# Patient Record
Sex: FEMALE | Race: BLACK OR AFRICAN AMERICAN | Employment: OTHER | URBAN - METROPOLITAN AREA
[De-identification: names, ages, dates, MRNs, and addresses within clinical notes are randomized per-mention and may not be internally consistent; named-entity substitution may affect disease eponyms.]

---

## 2018-08-30 ENCOUNTER — HOSPITAL ENCOUNTER (OUTPATIENT)
Dept: LAB | Age: 73
Discharge: HOME | End: 2018-08-30
Payer: MEDICARE

## 2018-08-30 ENCOUNTER — HOSPITAL ENCOUNTER (OUTPATIENT)
Dept: PREADMISSION TESTING | Age: 73
Discharge: HOME | End: 2018-08-30
Payer: MEDICARE

## 2018-08-30 ENCOUNTER — HOSPITAL ENCOUNTER (OUTPATIENT)
Dept: GENERAL RADIOLOGY | Age: 73
Discharge: HOME | End: 2018-08-30
Payer: MEDICARE

## 2018-08-30 DIAGNOSIS — Z01.818 PRE-OP TESTING: ICD-10-CM

## 2018-08-30 LAB
ANION GAP SERPL CALC-SCNC: 7 MMOL/L (ref 3–18)
BUN SERPL-MCNC: 14 MG/DL (ref 7–18)
BUN/CREAT SERPL: 15 (ref 12–20)
CALCIUM SERPL-MCNC: 9.1 MG/DL (ref 8.5–10.1)
CHLORIDE SERPL-SCNC: 105 MMOL/L (ref 100–108)
CO2 SERPL-SCNC: 29 MMOL/L (ref 21–32)
CREAT SERPL-MCNC: 0.96 MG/DL (ref 0.6–1.3)
ERYTHROCYTE [DISTWIDTH] IN BLOOD BY AUTOMATED COUNT: 14.4 % (ref 11.6–14.5)
GLUCOSE SERPL-MCNC: 75 MG/DL (ref 74–99)
HCT VFR BLD AUTO: 37.1 % (ref 35–45)
HGB BLD-MCNC: 12.2 G/DL (ref 12–16)
MCH RBC QN AUTO: 28 PG (ref 24–34)
MCHC RBC AUTO-ENTMCNC: 32.9 G/DL (ref 31–37)
MCV RBC AUTO: 85.3 FL (ref 74–97)
PLATELET # BLD AUTO: 255 K/UL (ref 135–420)
PMV BLD AUTO: 9.9 FL (ref 9.2–11.8)
POTASSIUM SERPL-SCNC: 3.6 MMOL/L (ref 3.5–5.5)
RBC # BLD AUTO: 4.35 M/UL (ref 4.2–5.3)
SODIUM SERPL-SCNC: 141 MMOL/L (ref 136–145)
WBC # BLD AUTO: 7.1 K/UL (ref 4.6–13.2)

## 2018-08-30 PROCEDURE — 93005 ELECTROCARDIOGRAM TRACING: CPT

## 2018-08-30 PROCEDURE — 36415 COLL VENOUS BLD VENIPUNCTURE: CPT | Performed by: ORTHOPAEDIC SURGERY

## 2018-08-30 PROCEDURE — 85027 COMPLETE CBC AUTOMATED: CPT | Performed by: ORTHOPAEDIC SURGERY

## 2018-08-30 PROCEDURE — 80048 BASIC METABOLIC PNL TOTAL CA: CPT | Performed by: ORTHOPAEDIC SURGERY

## 2018-08-30 RX ORDER — CHOLECALCIFEROL (VITAMIN D3) 125 MCG
2000 CAPSULE ORAL DAILY
COMMUNITY
End: 2020-09-21

## 2018-08-30 RX ORDER — AMLODIPINE AND BENAZEPRIL HYDROCHLORIDE 10; 20 MG/1; MG/1
1 CAPSULE ORAL DAILY
COMMUNITY
Start: 2018-08-08

## 2018-08-30 RX ORDER — EZETIMIBE 10 MG/1
10 TABLET ORAL
COMMUNITY
Start: 2018-07-09 | End: 2020-09-21

## 2018-08-30 RX ORDER — INSULIN ASPART 100 [IU]/ML
1-6 INJECTION, SOLUTION INTRAVENOUS; SUBCUTANEOUS
COMMUNITY
Start: 2018-06-15

## 2018-08-30 RX ORDER — OMEGA-3-ACID ETHYL ESTERS 1 G/1
1 CAPSULE, LIQUID FILLED ORAL
COMMUNITY
End: 2020-09-21

## 2018-08-30 RX ORDER — INSULIN GLARGINE 100 [IU]/ML
20 INJECTION, SOLUTION SUBCUTANEOUS
COMMUNITY
Start: 2018-08-23

## 2018-08-30 RX ORDER — ASPIRIN 81 MG/1
81 TABLET ORAL DAILY
COMMUNITY

## 2018-08-30 NOTE — PERIOP NOTES
PAT - SURGICAL PRE-ADMISSION INSTRUCTIONS    NAME:  Louis Remy                                                          TODAY'S DATE:  8/30/2018    SURGERY DATE:  9/26/2018                                  SURGERY ARRIVAL TIME:   0545    1. Do NOT eat or drink anything, including candy or gum, after MIDNIGHT on 09/25/18 , unless you have specific instructions from your Surgeon or Anesthesia Provider to do so. 2. No smoking on the day of surgery. 3. No alcohol 24 hours prior to the day of surgery. 4. No recreational drugs for one week prior to the day of surgery. 5. Leave all valuables, including money/purse, at home. 6. Remove all jewelry, nail polish, makeup (including mascara); no lotions, powders, deodorant, or perfume/cologne/after shave. 7. Glasses/Contact lenses and Dentures may be worn to the hospital.  They will be removed prior to surgery. 8. Call your doctor if symptoms of a cold or illness develop within 24 ours prior to surgery. 9. AN ADULT MUST DRIVE YOU HOME AFTER OUTPATIENT SURGERY. 10. If you are having an OUTPATIENT procedure, please make arrangements for a responsible adult to be with you for 24 hours after your surgery. 11. If you are admitted to the hospital, you will be assigned to a bed after surgery is complete. Normally a family member will not be able to see you until you are in your assigned bed. 15. Family is encouraged to accompany you to the hospital.  We ask visitors in the treatment area to be limited to ONE person at a time to ensure patient privacy. EXCEPTIONS WILL BE MADE AS NEEDED. 15. Children under 12 are discouraged from entering the treatment area and need to be supervised by an adult when in the waiting room. Special Instructions: Take these medications the morning of surgery with a sip of water:  NONE, STOP anticoagulants AT LEAST 1 WEEK PRIOR to your surgery or, follow other MD instructions:   All Vitamins     Patient Prep:    use CHG solution    These surgical instructions were reviewed with Emma Culver during the PAT visit . A printed copy of the instructions was provided to Emma Culver. Directions: On the morning of surgery, please go to the [facility]. Enter the building from the [facility] entrance, 1st floor (next to the Emergency Room entrance). Take the elevator to the 2nd floor. Sign in at the Registration Desk.     If you have any questions and/or concerns, please do not hesitate to call:  (Prior to the day of surgery)  [facility] unit:  [phone]  (Day of surgery)  [facility] unit:  [phone]

## 2018-08-31 LAB
ATRIAL RATE: 65 BPM
CALCULATED P AXIS, ECG09: 68 DEGREES
CALCULATED R AXIS, ECG10: 26 DEGREES
CALCULATED T AXIS, ECG11: 44 DEGREES
DIAGNOSIS, 93000: NORMAL
P-R INTERVAL, ECG05: 166 MS
Q-T INTERVAL, ECG07: 396 MS
QRS DURATION, ECG06: 72 MS
QTC CALCULATION (BEZET), ECG08: 411 MS
VENTRICULAR RATE, ECG03: 65 BPM

## 2018-09-04 ENCOUNTER — HOSPITAL ENCOUNTER (OUTPATIENT)
Dept: GENERAL RADIOLOGY | Age: 73
Discharge: HOME | End: 2018-09-04
Attending: ORTHOPAEDIC SURGERY
Payer: MEDICARE

## 2018-09-04 ENCOUNTER — HOSPITAL ENCOUNTER (OUTPATIENT)
Dept: PREADMISSION TESTING | Age: 73
Discharge: HOME | End: 2018-09-04
Payer: MEDICARE

## 2018-09-04 DIAGNOSIS — Z01.811 PRE-OP CHEST EXAM: ICD-10-CM

## 2018-09-04 PROCEDURE — 71046 X-RAY EXAM CHEST 2 VIEWS: CPT

## 2018-09-25 ENCOUNTER — ANESTHESIA EVENT (OUTPATIENT)
Dept: SURGERY | Age: 73
DRG: 460 | End: 2018-09-25
Payer: MEDICARE

## 2018-09-26 ENCOUNTER — APPOINTMENT (OUTPATIENT)
Dept: GENERAL RADIOLOGY | Age: 73
DRG: 460 | End: 2018-09-26
Attending: ORTHOPAEDIC SURGERY
Payer: MEDICARE

## 2018-09-26 ENCOUNTER — ANESTHESIA (OUTPATIENT)
Dept: SURGERY | Age: 73
DRG: 460 | End: 2018-09-26
Payer: MEDICARE

## 2018-09-26 ENCOUNTER — HOSPITAL ENCOUNTER (INPATIENT)
Age: 73
LOS: 3 days | Discharge: HOME HEALTH | DRG: 460 | End: 2018-09-29
Attending: ORTHOPAEDIC SURGERY | Admitting: ORTHOPAEDIC SURGERY
Payer: MEDICARE

## 2018-09-26 PROBLEM — M48.061 LUMBAR STENOSIS: Status: ACTIVE | Noted: 2018-09-26

## 2018-09-26 LAB
GLUCOSE BLD STRIP.AUTO-MCNC: 189 MG/DL (ref 70–110)
GLUCOSE BLD STRIP.AUTO-MCNC: 225 MG/DL (ref 70–110)
GLUCOSE BLD STRIP.AUTO-MCNC: 268 MG/DL (ref 70–110)

## 2018-09-26 PROCEDURE — C1762 CONN TISS, HUMAN(INC FASCIA): HCPCS | Performed by: ORTHOPAEDIC SURGERY

## 2018-09-26 PROCEDURE — 77030034850: Performed by: ORTHOPAEDIC SURGERY

## 2018-09-26 PROCEDURE — 77030003028 HC SUT VCRL J&J -A: Performed by: ORTHOPAEDIC SURGERY

## 2018-09-26 PROCEDURE — 74011000272 HC RX REV CODE- 272: Performed by: ORTHOPAEDIC SURGERY

## 2018-09-26 PROCEDURE — 74011250636 HC RX REV CODE- 250/636: Performed by: ORTHOPAEDIC SURGERY

## 2018-09-26 PROCEDURE — 77030031139 HC SUT VCRL2 J&J -A: Performed by: ORTHOPAEDIC SURGERY

## 2018-09-26 PROCEDURE — 77030020274 HC MISC IMPL ORTHOPEDIC: Performed by: ORTHOPAEDIC SURGERY

## 2018-09-26 PROCEDURE — 74011250636 HC RX REV CODE- 250/636

## 2018-09-26 PROCEDURE — 74011000250 HC RX REV CODE- 250: Performed by: ORTHOPAEDIC SURGERY

## 2018-09-26 PROCEDURE — C1713 ANCHOR/SCREW BN/BN,TIS/BN: HCPCS | Performed by: ORTHOPAEDIC SURGERY

## 2018-09-26 PROCEDURE — 74011636637 HC RX REV CODE- 636/637: Performed by: NURSE ANESTHETIST, CERTIFIED REGISTERED

## 2018-09-26 PROCEDURE — 76010000175 HC OR TIME 4 TO 4.5 HR INTENSV-TIER 1: Performed by: ORTHOPAEDIC SURGERY

## 2018-09-26 PROCEDURE — 77030018836 HC SOL IRR NACL ICUM -A: Performed by: ORTHOPAEDIC SURGERY

## 2018-09-26 PROCEDURE — 74011250637 HC RX REV CODE- 250/637: Performed by: NURSE ANESTHETIST, CERTIFIED REGISTERED

## 2018-09-26 PROCEDURE — 72100 X-RAY EXAM L-S SPINE 2/3 VWS: CPT

## 2018-09-26 PROCEDURE — 74011250636 HC RX REV CODE- 250/636: Performed by: NURSE ANESTHETIST, CERTIFIED REGISTERED

## 2018-09-26 PROCEDURE — 76210000006 HC OR PH I REC 0.5 TO 1 HR: Performed by: ORTHOPAEDIC SURGERY

## 2018-09-26 PROCEDURE — 77030008683 HC TU ET CUF COVD -A: Performed by: ANESTHESIOLOGY

## 2018-09-26 PROCEDURE — 77030014647 HC SEAL FBRN TISSL BAXT -D: Performed by: ORTHOPAEDIC SURGERY

## 2018-09-26 PROCEDURE — 74011000250 HC RX REV CODE- 250

## 2018-09-26 PROCEDURE — 74011636637 HC RX REV CODE- 636/637: Performed by: ORTHOPAEDIC SURGERY

## 2018-09-26 PROCEDURE — 77030037723 HC GRFT BN SUB BGNX -F: Performed by: ORTHOPAEDIC SURGERY

## 2018-09-26 PROCEDURE — 74011250637 HC RX REV CODE- 250/637: Performed by: ORTHOPAEDIC SURGERY

## 2018-09-26 PROCEDURE — 76060000039 HC ANESTHESIA 4 TO 4.5 HR: Performed by: ORTHOPAEDIC SURGERY

## 2018-09-26 PROCEDURE — 82962 GLUCOSE BLOOD TEST: CPT

## 2018-09-26 PROCEDURE — 77030032490 HC SLV COMPR SCD KNE COVD -B: Performed by: ORTHOPAEDIC SURGERY

## 2018-09-26 PROCEDURE — 65270000029 HC RM PRIVATE

## 2018-09-26 PROCEDURE — 77030008477 HC STYL SATN SLP COVD -A: Performed by: ANESTHESIOLOGY

## 2018-09-26 PROCEDURE — 77030004391 HC BUR FLUT MEDT -C: Performed by: ORTHOPAEDIC SURGERY

## 2018-09-26 PROCEDURE — 0SG1071 FUSION OF 2 OR MORE LUMBAR VERTEBRAL JOINTS WITH AUTOLOGOUS TISSUE SUBSTITUTE, POSTERIOR APPROACH, POSTERIOR COLUMN, OPEN APPROACH: ICD-10-PCS | Performed by: ORTHOPAEDIC SURGERY

## 2018-09-26 PROCEDURE — 77030018673: Performed by: ORTHOPAEDIC SURGERY

## 2018-09-26 PROCEDURE — 77030031359 HC BLD BN MILL DISP STRY -E: Performed by: ORTHOPAEDIC SURGERY

## 2018-09-26 PROCEDURE — 74011250637 HC RX REV CODE- 250/637: Performed by: INTERNAL MEDICINE

## 2018-09-26 PROCEDURE — 97162 PT EVAL MOD COMPLEX 30 MIN: CPT

## 2018-09-26 PROCEDURE — 77030018723 HC ELCTRD BLD COVD -A: Performed by: ORTHOPAEDIC SURGERY

## 2018-09-26 PROCEDURE — 97116 GAIT TRAINING THERAPY: CPT

## 2018-09-26 PROCEDURE — 74011636637 HC RX REV CODE- 636/637: Performed by: INTERNAL MEDICINE

## 2018-09-26 PROCEDURE — 77030013079 HC BLNKT BAIR HGGR 3M -A: Performed by: ANESTHESIOLOGY

## 2018-09-26 PROCEDURE — 30253N0: ICD-10-PCS | Performed by: ANESTHESIOLOGY

## 2018-09-26 DEVICE — SCREW POLYAX ASMBLY 6.5MMX45MM: Type: IMPLANTABLE DEVICE | Site: BACK | Status: FUNCTIONAL

## 2018-09-26 DEVICE — SCREW SPNL L50MM OD65MM POLYAX ASSEMB CANAVERAL: Type: IMPLANTABLE DEVICE | Site: BACK | Status: FUNCTIONAL

## 2018-09-26 DEVICE — GRAFT BNE 6ML SUB OSTEOSPAN MOLD OPN BRL SYR MORPHEUS: Type: IMPLANTABLE DEVICE | Site: BACK | Status: FUNCTIONAL

## 2018-09-26 RX ORDER — GLYCOPYRROLATE 0.2 MG/ML
INJECTION INTRAMUSCULAR; INTRAVENOUS AS NEEDED
Status: DISCONTINUED | OUTPATIENT
Start: 2018-09-26 | End: 2018-09-26 | Stop reason: HOSPADM

## 2018-09-26 RX ORDER — VANCOMYCIN HYDROCHLORIDE 1 G/20ML
INJECTION, POWDER, LYOPHILIZED, FOR SOLUTION INTRAVENOUS AS NEEDED
Status: DISCONTINUED | OUTPATIENT
Start: 2018-09-26 | End: 2018-09-26 | Stop reason: HOSPADM

## 2018-09-26 RX ORDER — ONDANSETRON 2 MG/ML
INJECTION INTRAMUSCULAR; INTRAVENOUS AS NEEDED
Status: DISCONTINUED | OUTPATIENT
Start: 2018-09-26 | End: 2018-09-26 | Stop reason: HOSPADM

## 2018-09-26 RX ORDER — CEFAZOLIN SODIUM 2 G/50ML
2 SOLUTION INTRAVENOUS EVERY 8 HOURS
Status: COMPLETED | OUTPATIENT
Start: 2018-09-26 | End: 2018-09-27

## 2018-09-26 RX ORDER — DEXTROSE MONOHYDRATE 25 G/50ML
25-50 INJECTION, SOLUTION INTRAVENOUS AS NEEDED
Status: DISCONTINUED | OUTPATIENT
Start: 2018-09-26 | End: 2018-09-26

## 2018-09-26 RX ORDER — ADHESIVE BANDAGE
30 BANDAGE TOPICAL DAILY PRN
Status: DISCONTINUED | OUTPATIENT
Start: 2018-09-26 | End: 2018-09-29 | Stop reason: HOSPADM

## 2018-09-26 RX ORDER — SODIUM CHLORIDE, SODIUM LACTATE, POTASSIUM CHLORIDE, CALCIUM CHLORIDE 600; 310; 30; 20 MG/100ML; MG/100ML; MG/100ML; MG/100ML
75 INJECTION, SOLUTION INTRAVENOUS CONTINUOUS
Status: DISPENSED | OUTPATIENT
Start: 2018-09-26 | End: 2018-09-27

## 2018-09-26 RX ORDER — MORPHINE SULFATE 10 MG/ML
5 INJECTION, SOLUTION INTRAMUSCULAR; INTRAVENOUS
Status: DISCONTINUED | OUTPATIENT
Start: 2018-09-26 | End: 2018-09-29 | Stop reason: HOSPADM

## 2018-09-26 RX ORDER — VECURONIUM BROMIDE FOR INJECTION 1 MG/ML
INJECTION, POWDER, LYOPHILIZED, FOR SOLUTION INTRAVENOUS AS NEEDED
Status: DISCONTINUED | OUTPATIENT
Start: 2018-09-26 | End: 2018-09-26 | Stop reason: HOSPADM

## 2018-09-26 RX ORDER — SODIUM CHLORIDE, SODIUM LACTATE, POTASSIUM CHLORIDE, CALCIUM CHLORIDE 600; 310; 30; 20 MG/100ML; MG/100ML; MG/100ML; MG/100ML
75 INJECTION, SOLUTION INTRAVENOUS CONTINUOUS
Status: DISCONTINUED | OUTPATIENT
Start: 2018-09-26 | End: 2018-09-26

## 2018-09-26 RX ORDER — FAMOTIDINE 20 MG/1
20 TABLET, FILM COATED ORAL ONCE
Status: COMPLETED | OUTPATIENT
Start: 2018-09-26 | End: 2018-09-26

## 2018-09-26 RX ORDER — FENTANYL CITRATE 50 UG/ML
INJECTION, SOLUTION INTRAMUSCULAR; INTRAVENOUS AS NEEDED
Status: DISCONTINUED | OUTPATIENT
Start: 2018-09-26 | End: 2018-09-26 | Stop reason: HOSPADM

## 2018-09-26 RX ORDER — HEPARIN SODIUM 1000 [USP'U]/ML
INJECTION, SOLUTION INTRAVENOUS; SUBCUTANEOUS AS NEEDED
Status: DISCONTINUED | OUTPATIENT
Start: 2018-09-26 | End: 2018-09-26 | Stop reason: HOSPADM

## 2018-09-26 RX ORDER — OXYCODONE AND ACETAMINOPHEN 10; 325 MG/1; MG/1
1-2 TABLET ORAL
Status: DISCONTINUED | OUTPATIENT
Start: 2018-09-26 | End: 2018-09-29 | Stop reason: HOSPADM

## 2018-09-26 RX ORDER — ONDANSETRON 2 MG/ML
4 INJECTION INTRAMUSCULAR; INTRAVENOUS
Status: DISCONTINUED | OUTPATIENT
Start: 2018-09-26 | End: 2018-09-29 | Stop reason: HOSPADM

## 2018-09-26 RX ORDER — FENTANYL CITRATE 50 UG/ML
50 INJECTION, SOLUTION INTRAMUSCULAR; INTRAVENOUS AS NEEDED
Status: DISCONTINUED | OUTPATIENT
Start: 2018-09-26 | End: 2018-09-26

## 2018-09-26 RX ORDER — DEXAMETHASONE SODIUM PHOSPHATE 4 MG/ML
INJECTION, SOLUTION INTRA-ARTICULAR; INTRALESIONAL; INTRAMUSCULAR; INTRAVENOUS; SOFT TISSUE AS NEEDED
Status: DISCONTINUED | OUTPATIENT
Start: 2018-09-26 | End: 2018-09-26 | Stop reason: HOSPADM

## 2018-09-26 RX ORDER — PROPOFOL 10 MG/ML
INJECTION, EMULSION INTRAVENOUS AS NEEDED
Status: DISCONTINUED | OUTPATIENT
Start: 2018-09-26 | End: 2018-09-26 | Stop reason: HOSPADM

## 2018-09-26 RX ORDER — DOCUSATE SODIUM 100 MG/1
100 CAPSULE, LIQUID FILLED ORAL DAILY
Status: DISCONTINUED | OUTPATIENT
Start: 2018-09-27 | End: 2018-09-29 | Stop reason: HOSPADM

## 2018-09-26 RX ORDER — DIAZEPAM 5 MG/1
5 TABLET ORAL
Status: DISCONTINUED | OUTPATIENT
Start: 2018-09-26 | End: 2018-09-29 | Stop reason: HOSPADM

## 2018-09-26 RX ORDER — SODIUM CHLORIDE AND POTASSIUM CHLORIDE .9; .15 G/100ML; G/100ML
SOLUTION INTRAVENOUS CONTINUOUS
Status: DISCONTINUED | OUTPATIENT
Start: 2018-09-26 | End: 2018-09-27

## 2018-09-26 RX ORDER — INSULIN GLARGINE 100 [IU]/ML
15 INJECTION, SOLUTION SUBCUTANEOUS DAILY
Status: DISCONTINUED | OUTPATIENT
Start: 2018-09-26 | End: 2018-09-26

## 2018-09-26 RX ORDER — HYDROMORPHONE HYDROCHLORIDE 1 MG/ML
INJECTION, SOLUTION INTRAMUSCULAR; INTRAVENOUS; SUBCUTANEOUS AS NEEDED
Status: DISCONTINUED | OUTPATIENT
Start: 2018-09-26 | End: 2018-09-26 | Stop reason: HOSPADM

## 2018-09-26 RX ORDER — MAG HYDROX/ALUMINUM HYD/SIMETH 200-200-20
30 SUSPENSION, ORAL (FINAL DOSE FORM) ORAL
Status: DISCONTINUED | OUTPATIENT
Start: 2018-09-26 | End: 2018-09-29 | Stop reason: HOSPADM

## 2018-09-26 RX ORDER — CEFAZOLIN SODIUM 2 G/50ML
2 SOLUTION INTRAVENOUS
Status: COMPLETED | OUTPATIENT
Start: 2018-09-26 | End: 2018-09-26

## 2018-09-26 RX ORDER — MORPHINE SULFATE 4 MG/ML
2 INJECTION INTRAVENOUS
Status: DISCONTINUED | OUTPATIENT
Start: 2018-09-26 | End: 2018-09-26

## 2018-09-26 RX ORDER — OXYCODONE AND ACETAMINOPHEN 10; 325 MG/1; MG/1
2 TABLET ORAL
Status: DISCONTINUED | OUTPATIENT
Start: 2018-09-26 | End: 2018-09-26

## 2018-09-26 RX ORDER — NEOSTIGMINE METHYLSULFATE 5 MG/5 ML
SYRINGE (ML) INTRAVENOUS AS NEEDED
Status: DISCONTINUED | OUTPATIENT
Start: 2018-09-26 | End: 2018-09-26 | Stop reason: HOSPADM

## 2018-09-26 RX ORDER — SODIUM CHLORIDE 0.9 % (FLUSH) 0.9 %
5-10 SYRINGE (ML) INJECTION EVERY 8 HOURS
Status: DISCONTINUED | OUTPATIENT
Start: 2018-09-26 | End: 2018-09-26

## 2018-09-26 RX ORDER — INSULIN LISPRO 100 [IU]/ML
INJECTION, SOLUTION INTRAVENOUS; SUBCUTANEOUS ONCE
Status: COMPLETED | OUTPATIENT
Start: 2018-09-26 | End: 2018-09-26

## 2018-09-26 RX ORDER — INSULIN LISPRO 100 [IU]/ML
INJECTION, SOLUTION INTRAVENOUS; SUBCUTANEOUS
Status: DISCONTINUED | OUTPATIENT
Start: 2018-09-27 | End: 2018-09-26

## 2018-09-26 RX ORDER — DEXTROSE MONOHYDRATE 25 G/50ML
25-50 INJECTION, SOLUTION INTRAVENOUS AS NEEDED
Status: DISCONTINUED | OUTPATIENT
Start: 2018-09-26 | End: 2018-09-29 | Stop reason: HOSPADM

## 2018-09-26 RX ORDER — INSULIN LISPRO 100 [IU]/ML
INJECTION, SOLUTION INTRAVENOUS; SUBCUTANEOUS
Status: DISCONTINUED | OUTPATIENT
Start: 2018-09-26 | End: 2018-09-29 | Stop reason: HOSPADM

## 2018-09-26 RX ORDER — MAGNESIUM SULFATE 100 %
16 CRYSTALS MISCELLANEOUS AS NEEDED
Status: DISCONTINUED | OUTPATIENT
Start: 2018-09-26 | End: 2018-09-29 | Stop reason: HOSPADM

## 2018-09-26 RX ORDER — MORPHINE SULFATE 10 MG/ML
10 INJECTION, SOLUTION INTRAMUSCULAR; INTRAVENOUS
Status: DISCONTINUED | OUTPATIENT
Start: 2018-09-26 | End: 2018-09-26

## 2018-09-26 RX ORDER — MAGNESIUM SULFATE 100 %
4 CRYSTALS MISCELLANEOUS AS NEEDED
Status: DISCONTINUED | OUTPATIENT
Start: 2018-09-26 | End: 2018-09-26

## 2018-09-26 RX ORDER — INSULIN LISPRO 100 [IU]/ML
INJECTION, SOLUTION INTRAVENOUS; SUBCUTANEOUS
Status: DISCONTINUED | OUTPATIENT
Start: 2018-09-26 | End: 2018-09-26

## 2018-09-26 RX ORDER — INSULIN GLARGINE 100 [IU]/ML
15 INJECTION, SOLUTION SUBCUTANEOUS
Status: DISCONTINUED | OUTPATIENT
Start: 2018-09-26 | End: 2018-09-27

## 2018-09-26 RX ORDER — DIPHENHYDRAMINE HYDROCHLORIDE 50 MG/ML
25 INJECTION, SOLUTION INTRAMUSCULAR; INTRAVENOUS
Status: DISCONTINUED | OUTPATIENT
Start: 2018-09-26 | End: 2018-09-26

## 2018-09-26 RX ORDER — ACETAMINOPHEN 325 MG/1
650 TABLET ORAL
Status: DISCONTINUED | OUTPATIENT
Start: 2018-09-26 | End: 2018-09-29 | Stop reason: HOSPADM

## 2018-09-26 RX ORDER — LIDOCAINE HYDROCHLORIDE 20 MG/ML
INJECTION, SOLUTION EPIDURAL; INFILTRATION; INTRACAUDAL; PERINEURAL AS NEEDED
Status: DISCONTINUED | OUTPATIENT
Start: 2018-09-26 | End: 2018-09-26 | Stop reason: HOSPADM

## 2018-09-26 RX ORDER — MAGNESIUM SULFATE 100 %
4 CRYSTALS MISCELLANEOUS AS NEEDED
Status: DISCONTINUED | OUTPATIENT
Start: 2018-09-26 | End: 2018-09-26 | Stop reason: SDUPTHER

## 2018-09-26 RX ORDER — SODIUM CHLORIDE 0.9 % (FLUSH) 0.9 %
5-10 SYRINGE (ML) INJECTION AS NEEDED
Status: DISCONTINUED | OUTPATIENT
Start: 2018-09-26 | End: 2018-09-29 | Stop reason: HOSPADM

## 2018-09-26 RX ORDER — DEXTROSE 50 % IN WATER (D50W) INTRAVENOUS SYRINGE
50 AS NEEDED
Status: DISCONTINUED | OUTPATIENT
Start: 2018-09-26 | End: 2018-09-29 | Stop reason: HOSPADM

## 2018-09-26 RX ORDER — HYDROCODONE BITARTRATE AND ACETAMINOPHEN 5; 325 MG/1; MG/1
1 TABLET ORAL ONCE
Status: DISCONTINUED | OUTPATIENT
Start: 2018-09-26 | End: 2018-09-26

## 2018-09-26 RX ORDER — LIDOCAINE HYDROCHLORIDE 10 MG/ML
0.1 INJECTION, SOLUTION EPIDURAL; INFILTRATION; INTRACAUDAL; PERINEURAL AS NEEDED
Status: DISCONTINUED | OUTPATIENT
Start: 2018-09-26 | End: 2018-09-29 | Stop reason: HOSPADM

## 2018-09-26 RX ADMIN — INSULIN GLARGINE 15 UNITS: 100 INJECTION, SOLUTION SUBCUTANEOUS at 22:43

## 2018-09-26 RX ADMIN — HYDROMORPHONE HYDROCHLORIDE 1 MG: 1 INJECTION, SOLUTION INTRAMUSCULAR; INTRAVENOUS; SUBCUTANEOUS at 11:14

## 2018-09-26 RX ADMIN — FENTANYL CITRATE 50 MCG: 50 INJECTION, SOLUTION INTRAMUSCULAR; INTRAVENOUS at 12:13

## 2018-09-26 RX ADMIN — CEFAZOLIN SODIUM 2 G: 2 SOLUTION INTRAVENOUS at 11:43

## 2018-09-26 RX ADMIN — HYDROMORPHONE HYDROCHLORIDE 0.5 MG: 1 INJECTION, SOLUTION INTRAMUSCULAR; INTRAVENOUS; SUBCUTANEOUS at 11:08

## 2018-09-26 RX ADMIN — OXYCODONE HYDROCHLORIDE AND ACETAMINOPHEN 2 TABLET: 10; 325 TABLET ORAL at 22:19

## 2018-09-26 RX ADMIN — ONDANSETRON 4 MG: 2 INJECTION INTRAMUSCULAR; INTRAVENOUS at 11:30

## 2018-09-26 RX ADMIN — POTASSIUM CHLORIDE AND SODIUM CHLORIDE: 900; 150 INJECTION, SOLUTION INTRAVENOUS at 22:50

## 2018-09-26 RX ADMIN — POTASSIUM CHLORIDE AND SODIUM CHLORIDE: 900; 150 INJECTION, SOLUTION INTRAVENOUS at 12:57

## 2018-09-26 RX ADMIN — PROPOFOL 120 MG: 10 INJECTION, EMULSION INTRAVENOUS at 07:39

## 2018-09-26 RX ADMIN — VECURONIUM BROMIDE FOR INJECTION 3 MG: 1 INJECTION, POWDER, LYOPHILIZED, FOR SOLUTION INTRAVENOUS at 10:08

## 2018-09-26 RX ADMIN — FAMOTIDINE 20 MG: 20 TABLET ORAL at 07:09

## 2018-09-26 RX ADMIN — INSULIN LISPRO 6 UNITS: 100 INJECTION, SOLUTION INTRAVENOUS; SUBCUTANEOUS at 12:07

## 2018-09-26 RX ADMIN — SODIUM CHLORIDE, SODIUM LACTATE, POTASSIUM CHLORIDE, AND CALCIUM CHLORIDE: 600; 310; 30; 20 INJECTION, SOLUTION INTRAVENOUS at 10:07

## 2018-09-26 RX ADMIN — LIDOCAINE HYDROCHLORIDE 100 MG: 20 INJECTION, SOLUTION EPIDURAL; INFILTRATION; INTRACAUDAL; PERINEURAL at 07:39

## 2018-09-26 RX ADMIN — GLYCOPYRROLATE 0.4 MG: 0.2 INJECTION INTRAMUSCULAR; INTRAVENOUS at 11:30

## 2018-09-26 RX ADMIN — CEFAZOLIN SODIUM 2 G: 2 SOLUTION INTRAVENOUS at 07:44

## 2018-09-26 RX ADMIN — Medication 3 MG: at 11:30

## 2018-09-26 RX ADMIN — VECURONIUM BROMIDE FOR INJECTION 10 MG: 1 INJECTION, POWDER, LYOPHILIZED, FOR SOLUTION INTRAVENOUS at 07:39

## 2018-09-26 RX ADMIN — Medication 10 ML: at 22:18

## 2018-09-26 RX ADMIN — OXYCODONE HYDROCHLORIDE AND ACETAMINOPHEN 2 TABLET: 10; 325 TABLET ORAL at 15:41

## 2018-09-26 RX ADMIN — INSULIN LISPRO 3 UNITS: 100 INJECTION, SOLUTION INTRAVENOUS; SUBCUTANEOUS at 07:09

## 2018-09-26 RX ADMIN — INSULIN LISPRO 6 UNITS: 100 INJECTION, SOLUTION INTRAVENOUS; SUBCUTANEOUS at 23:45

## 2018-09-26 RX ADMIN — DEXAMETHASONE SODIUM PHOSPHATE 8 MG: 4 INJECTION, SOLUTION INTRA-ARTICULAR; INTRALESIONAL; INTRAMUSCULAR; INTRAVENOUS; SOFT TISSUE at 07:46

## 2018-09-26 RX ADMIN — SODIUM CHLORIDE, SODIUM LACTATE, POTASSIUM CHLORIDE, AND CALCIUM CHLORIDE 75 ML/HR: 600; 310; 30; 20 INJECTION, SOLUTION INTRAVENOUS at 07:03

## 2018-09-26 RX ADMIN — ONDANSETRON 4 MG: 2 INJECTION INTRAMUSCULAR; INTRAVENOUS at 15:22

## 2018-09-26 RX ADMIN — HYDROMORPHONE HYDROCHLORIDE 0.5 MG: 1 INJECTION, SOLUTION INTRAMUSCULAR; INTRAVENOUS; SUBCUTANEOUS at 10:37

## 2018-09-26 RX ADMIN — CEFAZOLIN SODIUM 2 G: 2 SOLUTION INTRAVENOUS at 19:11

## 2018-09-26 RX ADMIN — VECURONIUM BROMIDE FOR INJECTION 3 MG: 1 INJECTION, POWDER, LYOPHILIZED, FOR SOLUTION INTRAVENOUS at 08:45

## 2018-09-26 RX ADMIN — FENTANYL CITRATE 100 MCG: 50 INJECTION, SOLUTION INTRAMUSCULAR; INTRAVENOUS at 07:46

## 2018-09-26 RX ADMIN — Medication 10 ML: at 15:25

## 2018-09-26 RX ADMIN — FENTANYL CITRATE 100 MCG: 50 INJECTION, SOLUTION INTRAMUSCULAR; INTRAVENOUS at 07:34

## 2018-09-26 NOTE — PERIOP NOTES
TRANSFER - OUT REPORT:    Verbal report given to louie bartlett(name) on Raghavendra Toledo  being transferred to 2219(unit) for routine post - op       Report consisted of patients Situation, Background, Assessment and   Recommendations(SBAR). Information from the following report(s) SBAR, OR Summary, Intake/Output and MAR was reviewed with the receiving nurse. Lines:   Peripheral IV 09/26/18 Right Hand (Active)   Site Assessment Clean, dry, & intact 9/26/2018 11:56 AM   Phlebitis Assessment 0 9/26/2018 11:56 AM   Infiltration Assessment 0 9/26/2018 11:56 AM   Dressing Status Clean, dry, & intact 9/26/2018 11:56 AM   Dressing Type Transparent;Tape 9/26/2018 11:56 AM   Hub Color/Line Status Infusing;Pink 9/26/2018 11:56 AM        Opportunity for questions and clarification was provided.       Patient transported with:   [employer]

## 2018-09-26 NOTE — PROGRESS NOTES
conducted a pre-surgery visit with Dipesh Stroud, who is a 74 y.o.,female. The  provided the following Interventions:  Initiated a relationship of care and support. Offered prayer and assurance of continued prayers on patient's behalf. Plan:  Chaplains will continue to follow and will provide pastoral care on an as needed/requested basis.  recommends bedside caregivers page  on duty if patient shows signs of acute spiritual or emotional distress.     [address] [facility]   Staff [address] [facility]   [phone]

## 2018-09-26 NOTE — OP NOTES
BRIEF OPERATIVE NOTE    Date of Procedure: 9/26/2018     Preoperative Diagnosis: spondylolisthesis  m43.16    Postoperative Diagnosis: spondylolisthesis  m43.16      Procedure: Procedure(s):  decompression and fusion, flospine l3-5    Surgeon(s) and Role:     * Siri Bartlett MD - Primary    Anesthesia: General     Findings: deg sonduylo l3-4 and 4-5 with stenosis     Estimated Blood Loss: 200  Replaced0      [id number]        [id number]    Specimens: * No specimens in log *     Tubes/Drains: None    Needle/sponge count:  Correct    Complications: 0    Plan    Up at [facility]  Dr Yandy Myers to see  Luke fierro in am  PT ambulate wioth tlso  Home 2-3 days with tlso and percocet  rto 1 month

## 2018-09-26 NOTE — CONSULTS
Reason for consultation:    Monitoring and management of  acute and chronic medical problems   POSTOPERATIVE DIAGNOSIS:   Degenerative spondylolisthesis, L4-L5 with stenosis.     PROCEDURES PERFORMED:  1. Lumbar decompression L3-L5 bilateral exploration and decompression of the L3, L4, L5 nerve roots with foraminotomy and partial facetectomy. 2.  Flow spine pedicle instrumentation L3-L5. 3.  Bilateral posterolateral spinal fusion L3-L5 local bone graft and autologous platelet factor.       HPI: Pleasant lady post uneventful L/S decompression and fusion; presently post op pain controlled      ACTIVE MEDICAL PROBLEMS/PMH/PSH  T2DM dxd about 10 yrs ago; no complications; no ED or hospitalization for hypo/hyperglycemia  HTN  HLP    Seborrheic dermatitis       Menopausal syndrome   previously requiring, hormonal therapy   Tobacco abuse   History of quit in 2003   Carpal tunnel syndrome       Uterine fibroid       Venous varices       Osteopenia       Unspecified vitamin D deficiency       Diarrhea   likely IBS, neg celiac panel 5/2005 improved with diet changes   Anemia       Back pain       Hip pain       Snoring       Fatigue       Plantar fasciitis       Varicella       No personal or family h/o thrombophilia disorder     Patient Active Problem List   Diagnosis Code    Lumbar stenosis M48.061     Past Medical History:   Diagnosis Date    Diabetes ([facility] [address]     Hypercholesteremia     Hypertension        Past Surgical History:   Procedure Laterality Date    HX ROTATOR CUFF REPAIR         PTA MEDICATIONS  Prescriptions Prior to Admission   Medication Sig    LANTUS SOLOSTAR U-100 INSULIN 100 unit/mL (3 mL) inpn 16 Units by SubCUTAneous route nightly.  NOVOLOG FLEXPEN U-100 INSULIN 100 unit/mL inpn 1-6 Units by SubCUTAneous route Before breakfast, lunch, and dinner.  amLODIPine-benazepril (LOTREL) 10-20 mg per capsule Take 1 Cap by mouth daily.     ezetimibe (ZETIA) 10 mg tablet Take 10 mg by mouth every three (3) days.  cholecalciferol, vitamin D3, 2,000 unit tab Take 2,000 Units by mouth daily.  aspirin delayed-release 81 mg tablet Take 81 mg by mouth daily.  omega-3 acid ethyl esters (LOVAZA) 1 gram capsule Take 1 g by mouth daily (with breakfast). ALLERGIES:  Glimepiride other/intolerance   04/17/2018     Terbinafine Hcl other/intolerance   07/10/2017 Stomach pains     Metformin other/intolerance   04/17/2018     Statins-Hmg-Coa Reductase Inhibitors           FAMILY HISTORY   Cancer, Prostate Brother       Hypertension Brother       COPD Brother       Cancer, Prostate Brother       Heart Failure Father       Cancer, Breast Maternal Grandmother       Heart Failure Mother       Thyroid Disease Mother       Vascular Disease Mother       Diabetes Sister         SOCIAL HISTORY  Social History     Social History    Marital status:      Spouse name: N/A    Number of children: N/A    Years of education: N/A     Occupational History    Not on file. Social History Main Topics    Smoking status: Former Smoker     Quit date: 8/30/2002    Smokeless tobacco: Never Used    Alcohol use No      Comment: occ.  Drug use: No    Sexual activity: Not on file     Other Topics Concern    Not on file     Social History Narrative       ROS:  Constitutional:  No chills . + sinus headache. Eyes: No visual changes  Ears: . No hearing loss. Nose: + congestion. No bleed  Neck: No pain. Cardiac: No CP. ARRIOLA. No orthopnea. No PND. No leg edema . No palpitation  Respiratory: No cough . No SOB. No wheezing . GI: No nausea . No vomiting. No reflux. No abdominal pain. Normal BM . No black stool. No blood in the stool  : No dysuria. No incontinence. No hematuria  M/S:+No back pain  Neuro: No numbness . No weakness  Skin: No rash. No itching  HEM/LYMPH:  No bruising. Endocrine: No polydipsia. No change in tone of voice. No cold intolerance  Psych: No anxiety . No depression.  No sleep difficulty       Physical Exam:      Visit Vitals    /70 (BP 1 Location: Right arm, BP Patient Position: At rest)    Pulse 65    Temp 97.5 °F (36.4 °C)    Resp 12    Ht 5' 5\" (1.651 m)    Wt 102.1 kg (225 lb)    SpO2 94%    BMI 37.44 kg/m2     GENERAL: Awake NAD  HEENT:    Face:Symmetrical  CONJUNCTIVA: Pink. SCLERA: Anicteric    OTOSCOPIC EXAM:EAC WNL T/M WNL  NASAL MUCOSA:  No Discharge.  No bleeding  ORAL MUCOSA: Moist. No lesions  TONGUE: Moist. No lesions  TEETH: WNL. + Extractions . No broken or loose tooth    GUMS: No bleeding or swelling  HARD SOFT/ PALATE/TONSILS: No Swelling. No ulcers. No Masses  OROPHARYNX: WNL  NECK: No JVD. No masses. No enlarged-tender lymph nodes.  Trachea in mid line.  THYROID: Size normal. No thyroid nodules    CAROTID ARTERIES: Pulsation 2+ bilaterally. No bruits  LUNGS: CTA. BS Normal Bilaterally  HEART: RRR   Normal S1 S2. No Significant murmur. No S3 S4  ABDOMEN: Soft. Normal BS. No tenderness. No HSM /SMG. No palpable enlarged aorta. No bruits. Fierro in place; good urine flow  LE: No edema. SCD in place  PULSES: Radial 2+; Femoral 2+; Posterior Tibialis 2+   SKIN:No rash. No ecchymosis. MUSCULOSKELETAL:      Adequate ROM in all extremeties    NEUROLOGIC:     Oriented X 4  Muscle Strength, Bulk & Tone  RUE: strength, bulk & tone: WNL  LUE: strength, bulk & tone: WNL  RLE: strength, bulk & tone: WNL  LLE: strength, bulk & tone:  WNL     Reflexes:  Not testesd     PSYCHIATRIC    Mood: normal  Affect: appropriate                     Labs Reviewed:    Recent Results (from the past 24 hour(s))   GLUCOSE, POC    Collection Time: 09/26/18  7:04 AM   Result Value Ref Range    Glucose (POC) 189 (H) 70 - 110 mg/dL   GLUCOSE, POC    Collection Time: 09/26/18 11:53 AM   Result Value Ref Range    Glucose (POC) 225 (H) 70 - 110 mg/dL     ASSESSMENT  Stable post op   Post op pain controlled  T2DM  HTN    PLAN  Adjust pain meds  Valium PRN  Restart PTA meds as conditions allow  D/C fierro in AM      Rayo Cota MD  9/26/2018, 6:31 PM

## 2018-09-26 NOTE — ANESTHESIA PREPROCEDURE EVALUATION
Anesthetic History No history of anesthetic complications Review of Systems / Medical History Patient summary reviewed, nursing notes reviewed and pertinent labs reviewed Pulmonary Comments: Pt states she has phlegm and secretions Neuro/Psych Within defined limits Cardiovascular Hypertension: well controlled Exercise tolerance: >4 METS 
  
GI/Hepatic/Renal 
Within defined limits Endo/Other Diabetes: using insulin Morbid obesity Other Findings Physical Exam 
 
Airway Mallampati: III 
TM Distance: 4 - 6 cm Neck ROM: normal range of motion Mouth opening: Normal 
 
 Cardiovascular Regular rate and rhythm,  S1 and S2 normal,  no murmur, click, rub, or gallop Rhythm: regular Rate: normal 
 
 
 
 Dental 
 
Dentition: Poor dentition Pulmonary Breath sounds clear to auscultation Abdominal 
GI exam deferred Other Findings Anesthetic Plan ASA: 3 Anesthesia type: general 
 
 
 
 
Induction: Intravenous Anesthetic plan and risks discussed with: Patient

## 2018-09-26 NOTE — ANESTHESIA POSTPROCEDURE EVALUATION
Post-Anesthesia Evaluation and Assessment Patient: Maryam Vargas MRN: [id number]  SSN: [id number] YOB: 1945  Age: 74 y.o. Sex: female Cardiovascular Function/Vital Signs Visit Vitals  /70 (BP 1 Location: Right arm, BP Patient Position: At rest)  Pulse 65  Temp 36.4 °C (97.5 °F)  Resp 12  Ht 5' 5\" (1.651 m)  Wt 102.1 kg (225 lb)  SpO2 94%  BMI 37.44 kg/m2 Patient is status post general anesthesia for Procedure(s): 
decompression and fusion, flospine l3-5. Nausea/Vomiting: None Postoperative hydration reviewed and adequate. Pain: 
Pain Scale 1: Numeric (0 - 10) (09/26/18 1228) Pain Intensity 1: 4 (09/26/18 1228) Managed Neurological Status:  
Neuro (WDL): Within Defined Limits (09/26/18 1228) Neuro LUE Motor Response: Purposeful (09/26/18 1228) LLE Motor Response: Purposeful (09/26/18 1228) RUE Motor Response: Purposeful (09/26/18 1228) RLE Motor Response: Purposeful (09/26/18 1228) At baseline Mental Status and Level of Consciousness: Arousable Pulmonary Status:  
O2 Device: Nasal cannula (09/26/18 1305) Adequate oxygenation and airway patent Complications related to anesthesia: None Post-anesthesia assessment completed. No concerns Signed By: Soledad Estrella MD   
 September 26, 2018

## 2018-09-26 NOTE — OP NOTES
[address] REPORT    Name:Hammad ABREU  MR#: [id number]  : 1945  ACCOUNT #: [de-identified]   DATE OF SERVICE: 2018    PREOPERATIVE DIAGNOSIS:  Degenerative spondylolisthesis, L4-L5 with stenosis. POSTOPERATIVE DIAGNOSIS:   Degenerative spondylolisthesis, L4-L5 with stenosis. PROCEDURES PERFORMED:  1. Lumbar decompression L3-L5 bilateral exploration and decompression of the L3, L4, L5 nerve roots with foraminotomy and partial facetectomy. 2.  Flow spine pedicle instrumentation L3-L5. 3.  Bilateral posterolateral spinal fusion L3-L5 local bone graft and autologous platelet factor. SURGEON:  Maritza Borrero MD    ASSISTANT:  Ulises Newell. ANESTHESIA:  General.    FINDINGS:  The patient had degenerative spondylolisthesis L3-4, L4-5 with stenosis L3-L4 greater than L4-L5. DESCRIPTION OF PROCEDURE:  Under general anesthesia, with the patient in a prone position on the Dashawn frame, peripheral nerves padded. Back prepped and draped in a sterile fashion. Midline incision made L3-L5 and muscles subperiosteally exposed, divided transverse processes. Radiograph taken for localization and position. The inferior portion of the L2 lamina, L3 lamina and L4 lamina, superior portion of L5 lamina were removed with Le[place]ell and Kerrison rongeurs. Decompression wide to the L3, L4, L5 pedicles bilaterally in the respective L3, L4 and L4 nerve roots decompressed bilaterally around the pedicles into the foramen, performed foraminotomy and partial facetectomy. At the end of decompression    be placed in the pedicles and into the foramen without nerve root compromise. bilaterally L3, L4, L4    radiographs obtained were satisfactory. Beginning first on the left hand side, placed in lateral    decorticated L3-L5 follow by placement of bone graft material and pedicle screws. A similar fashion on the right hand side with decorticated bone graft and pedicle screws placed.   Medial pedicle   , no exposed    satisfactory. Rods placed bilaterally followed by    screws which were tightened and torqued   . Standard radiographs obtained with   . Nerve roots were evaluated and are satisfactory. Gelfoam placed under    as well as patient awakened and taken to recovery room satisfactory condition without complication. ESTIMATED BLOOD LOSS:   200 mL. The patient received 1800 mL of crystalloid fluid, 115 mL of   . SPECIMENS REMOVED:   None. DRAINS:  None. Sponge count correct 0    At time of dictation, patient not awakened sufficiently to test motor sensation. COMPLICATIONS:        IMPLANTS:          MD KATT Orozco / MARNIE  D: 09/26/2018 11:53     T: 09/26/2018 17:00  JOB #: [id number]  CC: Elin Cm MD  CC: BENJI De Souza MD

## 2018-09-26 NOTE — PROGRESS NOTES
Admission report given by Carlos Cortes RN. Pt resting in bed with HOB elevated, bed locked at lowest position call bell in reach. Family members at bedside. [facility], no swelling, erythema or infiltration noted a this time. Dressing to back is c/d/i. Pt has purposeful movement in all extremities. Pt shows no signs of distress at this time. Will continue to monitor. 1600 Physical therapy working with patient    1800- No change during shift    1915 - Bedside and Verbal shift change report given to Jaz (oncoming nurse) by Gilbert Schofield (offgoing nurse). Report included the following information SBAR, Kardex and MAR.

## 2018-09-26 NOTE — PROGRESS NOTES
Problem: Mobility Impaired (Adult and Pediatric)  Goal: *Acute Goals and Plan of Care (Insert Text)  Physical Therapy Goals   Initiated 9/26/2018 and to be accomplished within 7 days. 1.  Patient will complete all bed mobility with modified independence in order to prepare for EOB/OOB activity. 2.  Patient will perform sit <> stand with modified independence in order to prepare for OOB/gait activity. 3.  Patient will perform bed to chair transfers with modified independence in order to promote mobility and encourage seated activity to progress towards their prior level of function. 4.  Patient will ambulate 300 feet with modified independence using LRAD in order to prepare for safe negotiation of their environment. 5.  Patient will ascend/descend 4 stairs with HR with modified independence for safe exit/entry into her home    Outcome: Progressing Towards Goal  PHYSICAL THERAPY: Initial Assessment   INPATIENT: Medicare: Hospital Day: 1     Patient: Louis Remy [de-identified]74 y.o. female)    Date: 9/26/2018  Primary Diagnosis: spondylolisthesis  m43.16  Lumbar stenosis   Procedure(s) (LRB):  decompression and fusion, flospine l3-5 (N/A), Day of Surgery   Precautions: Spinal     PLOF: Independent     ASSESSMENT :  Patient supine in bed, agreeable to participation with PT.  present. Reports that she lives in single story home with 4 COTY and HR. MOD A X 1 for supine <> sit using log roll. MAX x 2 for sit <> stand with RW for support. Able to ambulate 3 steps forward with RW and CGA; returned to bed d/t c/o of nausea. Patient returned supine with MOD A x 1 using log roll and MAX x 2 to scoot up in bed. Left supine with all needs within reach. RN Marion present. Reviewed lumbar precautions with patient; hand out provided. Activity tolerance limited by nausea. Recommend d/c home with home health pending progression with PT.       Patient presents with deficits in:  Bed Mobility, Transfers, Gait, Strength and Stairs    Patient will benefit from skilled intervention to address the above impairments. Patients rehabilitation potential is considered to be Good  Factors which may influence rehabilitation potential include:   []         None noted  []         Mental ability/status  [x]         Medical condition  []         Home/family situation and support systems  []         Safety awareness  []         Pain tolerance/management  []         Other:      PLAN :  Recommendations and Planned Interventions:  [x]           Bed Mobility Training             [x]    Neuromuscular Re-Education  [x]           Transfer Training                   []    Orthotic/Prosthetic Training  [x]           Gait Training                          []    Modalities  [x]           Therapeutic Exercises          []    Edema Management/Control  [x]           Therapeutic Activities            [x]    Patient and Family Training/Education  []           Other (comment):      EDUCATION:   Education:  Patient was educated on the following topics: purpose of PT, PT POC, strategy for bed mobility and transfers, safety with mobility, lumbar precautions, use of brace. Verbalizes understanding. Barriers to Learning/Limitations: None  Compensate with: visual, verbal, tactile, kinesthetic cues/model    Recommendations for the next treatment session: Gait  Frequency/Duration: Patient will be followed by physical therapy 1-2 times per day/4-7 days per week to address goals. Discharge Recommendations: Home Health  Further Equipment Recommendations for Discharge: rolling walker  Factors which may impact discharge planning: Medical condition      SUBJECTIVE:   Patient stated I used a cane at home for the back pain.     OBJECTIVE DATA SUMMARY:     Past Medical History:   Diagnosis Date    Diabetes ([address]     Hypercholesteremia     Hypertension      Past Surgical History:   Procedure Laterality Date    HX ROTATOR CUFF REPAIR           Eval Complexity: History: MEDIUM  Complexity : 1-2 comorbidities / personal factors will impact the outcome/ POC Exam:MEDIUM Complexity : 3 Standardized tests and measures addressing body structure, function, activity limitation and / or participation in recreation  Presentation: MEDIUM Complexity : Evolving with changing characteristics  Clinical Decision Making:Medium Complexity Requires assist and AD for mobility Overall Complexity:MEDIUM    G CODES:Mobility  Current  CJ= 20-39%   Goal  CI= 1-19%. The severity rating is based on the Other Patient requires assist for mobility and AD, currently has increased pain       Prior Level of Function/Home Situation:   Home Situation  Home Environment: Private residence  # Steps to Enter: 4  Rails to Enter: Yes  Living Alone: No  Support Systems: Spouse/Significant Other/Partner  Patient Expects to be Discharged to[de-identified] Private residence  Current DME Used/Available at Home: [employer]  Critical Behavior:  Neurologic State: Alert  Orientation Level: Oriented X4  Cognition: Follows commands  Safety/Judgement: Fall prevention  Psychosocial  Patient Behaviors: Calm; Cooperative  Purposeful Interaction: Yes     Tone : normal  Sensation: NT  Range Of Motion: WFL    Functional Mobility:      Functional Status      Indep   (I)   Mod I   Super-vision   Min A   Mod A   Max A   Total A   Assist x2 Verbal cues Additional time Not tested   Comments   Rolling []  []  [] []    []    []  []  [] [] [] [x]    Supine to sit []  []  [] []  [x]  []  []  [] [] [x] []    Sit to supine []  []  [] []  [x]  []  []  [] [] [] []    Sit to stand []  []  [] []  []  [x]  []  [x] [] [] []    Stand to sit []  []  [] [x]  []  []  []  [] [] [] []    Bed to chair transfers []  []  [] []  []  []  []  [] [] [] [x]        Balance    Good   Fair   Poor   Unable   Not tested   Comments   Sitting static [x]  []  []  []  []    Sitting dynamic [x]  []  []  []  []    Standing static []  [x]  []  []  []    Standing dynamic []  [x] []  []  []        Mobility/Gait:   Level of Assistance: Contact guard assistance  Assistive Device: rolling walker  Distance Ambulated: 3 feet     Left Lower Extremity: FWB  Right Lower Extremity: FWB  Base of Support: [facility]  Speed/Aissatou: pace decreased (<100 feet/min)  Step Length: WFL  Swing Pattern: WFL  Stance: WFL  Gait Abnormalities: altered arm swing     Pain: Back pain  Pre treatment pain level: 5  Post treatment pain level: 7    Vital Signs  Temp: 97.5 °F (36.4 °C)     Pulse (Heart Rate): 65     BP: 110/70     Resp Rate: 12     O2 Sat (%): 94 %    Activity Tolerance:   Fair, limited by nausea    Please refer to the flowsheet for vital signs taken during this treatment. After treatment:   []         Patient left in no apparent distress sitting up in chair  [x]         Patient left in no apparent distress in bed  [x]         Call bell left within reach  [x]         Nursing notified  [x]         Caregiver present  []         Bed alarm activated    COMMUNICATION/EDUCATION:   [x]         Fall prevention education was provided and the patient/caregiver indicated understanding. [x]         Patient/family have participated as able in goal setting and plan of care. [x]         Patient/family agree to work toward stated goals and plan of care. []         Patient understands intent and goals of therapy, but is neutral about his/her participation. []         Patient is unable to participate in goal setting and plan of care.     Recommendations for nursing:  Written on communication board: No Bending/Lifting/Twisting, up with assist, PT 2x/daily    Thank you for this referral.  Daniel Guess   Time Calculation: 33 mins

## 2018-09-26 NOTE — IP AVS SNAPSHOT
[facility] 
 
 
 [address] [facility] [id number] [place] [address] Blvd Patient: Jessica Ambriz MRN: [id number] ASF:1[birth year] About your hospitalization You were admitted on:  September 26, 2018 You last received care in the:  [address] [place] [address],[address] You were discharged on:  September 29, 2018 Why you were hospitalized Your primary diagnosis was:  Not on File Your diagnoses also included:  Lumbar Stenosis Follow-up Information Follow up With Details Comments Contact Info Ruiz Ortiz MD   [address] [place] [address] 200 200 [facility] 
[phone] Layo Carrero MD On 10/22/2018  [place] [address] [address] 2201 [facility] [address] [phone] Discharge Orders None A check carl indicates which time of day the medication should be taken. My Medications ASK your doctor about these medications Instructions Each Dose to Equal  
 Morning Noon Evening Bedtime  
 amLODIPine-benazepril 10-20 mg per capsule Commonly known as:  Kumar Beal Your last dose was: Your next dose is: Take 1 Cap by mouth daily. 1 Cap  
    
   
   
   
  
 aspirin delayed-release 81 mg tablet Your last dose was: Your next dose is: Take 81 mg by mouth daily. 81 mg  
    
   
   
   
  
 cholecalciferol (vitamin D3) 2,000 unit Tab Your last dose was: Your next dose is: Take 2,000 Units by mouth daily. 2000 Units  
    
   
   
   
  
 ezetimibe 10 mg tablet Commonly known as:  Willis Vazquez Your last dose was: Your next dose is: Take 10 mg by mouth every three (3) days. 10 mg  
    
   
   
   
  
 LANTUS SOLOSTAR U-100 INSULIN 100 unit/mL (3 mL) Inpn Generic drug:  insulin glargine Your last dose was: Your next dose is:    
   
   
 16 Units by SubCUTAneous route nightly. 16 Units NovoLOG Flexpen U-100 Insulin 100 unit/mL Inpn Generic drug:  insulin aspart U-100 Your last dose was: Your next dose is:    
   
   
 1-6 Units by SubCUTAneous route Before breakfast, lunch, and dinner. 1-6 Units  
    
   
   
   
  
 omega-3 acid ethyl esters 1 gram capsule Commonly known as:  Margean Croak Your last dose was: Your next dose is: Take 1 g by mouth daily (with breakfast). 1 g Discharge Instructions DISCHARGE SUMMARY from Nurse PATIENT INSTRUCTIONS: 
 
 
F-face looks uneven A-arms unable to move or move unevenly S-speech slurred or non-existent T-time-call 911 as soon as signs and symptoms begin-DO NOT go Back to bed or wait to see if you get better-TIME IS BRAIN. Warning Signs of HEART ATTACK Call 911 if you have these symptoms: 
? Chest discomfort. Most heart attacks involve discomfort in the center of the chest that lasts more than a few minutes, or that goes away and comes back. It can feel like uncomfortable pressure, squeezing, fullness, or pain. ? Discomfort in other areas of the upper body. Symptoms can include pain or discomfort in one or both arms, the back, neck, jaw, or stomach. ? Shortness of breath with or without chest discomfort. ? Other signs may include breaking out in a cold sweat, nausea, or lightheadedness. Don't wait more than five minutes to call [address]! Fast action can save your life. Calling 911 is almost always the fastest way to get lifesaving treatment.  Emergency Medical Services staff can begin treatment when they arrive  up to an hour sooner than if someone gets to the hospital by car. MyChart Activation Thank you for requesting access to Gather App. Please follow the instructions below to securely access and download your online medical record. Gather App allows you to send messages to your doctor, view your test results, renew your prescriptions, schedule appointments, and more. How Do I Sign Up? 1. In your internet browser, go to [email or web address] 
2. Click on the First Time User? Click Here link in the Sign In box. You will be redirect to the New Member Sign Up page. 3. Enter your Gather App Access Code exactly as it appears below. You will not need to use this code after youve completed the sign-up process. If you do not sign up before the expiration date, you must request a new code. Gather App Access Code: [email or web address] Expires: 2018  4:37 PM (This is the date your Gather App access code will ) 4. Enter the last four digits of your Social Security Number (xxxx) and Date of Birth (mm/dd/yyyy) as indicated and click Submit. You will be taken to the next sign-up page. 5. Create a Gather App ID. This will be your Gather App login ID and cannot be changed, so think of one that is secure and easy to remember. 6. Create a Gather App password. You can change your password at any time. 7. Enter your Password Reset Question and Answer. This can be used at a later time if you forget your password. 8. Enter your e-mail address. You will receive e-mail notification when new information is available in [address] 9. Click Sign Up. You can now view and download portions of your medical record. 10. Click the Download Summary menu link to download a portable copy of your medical information. Additional Information If you have questions, please visit the Frequently Asked Questions section of the Gather App website at [email or web address] Remember, "Hey, Neighbor!" is NOT to be used for urgent needs. For medical emergencies, dial 911. Patient armband removed and shredded The discharge information has been reviewed with the patient and spouse. The patient and spouse verbalized understanding. Discharge medications reviewed with the patient and spouse and appropriate educational materials and side effects teaching were provided. ___________________________________________________________________________________________________________________________________ Introducing [facility]! [facility] introduces "Hey, Neighbor!" patient portal. Now you can access parts of your medical record, email your doctor's office, and request medication refills online. 1. In your internet browser, go to [email or web address] 2. Click on the First Time User? Click Here link in the Sign In box. You will see the New Member Sign Up page. 3. Enter your "Hey, Neighbor!" Access Code exactly as it appears below. You will not need to use this code after youve completed the sign-up process. If you do not sign up before the expiration date, you must request a new code. · "Hey, Neighbor!" Access Code: [email or web address] Expires: 11/28/2018  4:37 PM 
 
4. Enter the last four digits of your Social Security Number (xxxx) and Date of Birth (mm/dd/yyyy) as indicated and click Submit. You will be taken to the next sign-up page. 5. Create a Seaborn Networkst ID. This will be your Seaborn Networkst login ID and cannot be changed, so think of one that is secure and easy to remember. 6. Create a "Hey, Neighbor!" password. You can change your password at any time. 7. Enter your Password Reset Question and Answer. This can be used at a later time if you forget your password. 8. Enter your e-mail address. You will receive e-mail notification when new information is available in [address] 9. Click Sign Up. You can now view and download portions of your medical record. 10. Click the Download Summary menu link to download a portable copy of your medical information. If you have questions, please visit the Frequently Asked Questions section of the [employer]th[employer] website. Remember, [employer] is NOT to be used for urgent needs. For medical emergencies, dial 911. Now available from your iPhone and Android! Introducing Keshawn Dave As a Noman Forrester patient, I wanted to make you aware of our electronic visit tool called Keshawn Dave. ContraFect/Keepskor allows you to connect within minutes with a medical provider 24 hours a day, seven days a week via a mobile device or tablet or logging into a secure website from your computer. You can access Keshawn Tripsourcingalbafin from anywhere in the [place]. A virtual visit might be right for you when you have a simple condition and feel like you just dont want to get out of bed, or cant get away from work for an appointment, when your regular [employer] provider is not available (evenings, weekends or holidays), or when youre out of town and need minor care. Electronic visits cost only $49 and if the ContraFect/Keepskor provider determines a prescription is needed to treat your condition, one can be electronically transmitted to a nearby pharmacy*. Please take a moment to enroll today if you have not already done so. The enrollment process is free and takes just a few minutes. To enroll, please download the Secpanel nat to your tablet or phone, or visit [email or web address] to enroll on your computer. And, as an [address] [place] [address] patient with a Databox account, the results of your visits will be scanned into your electronic medical record and your primary care provider will be able to view the scanned results. We urge you to continue to see your regular Brennan Usama provider for your ongoing medical care.   And while your primary care provider may not be the one available when you seek a Keshawn Dave virtual visit, the peace of mind you get from getting a real diagnosis real time can be priceless. For more information on Keshawn Penaalbafin, view our Frequently Asked Questions (FAQs) at [email or web address]. Sincerely, 
 
Noah Richter MD 
Chief Medical Officer [employer] *:  certain medications cannot be prescribed via Keshawn Jeanal[employer]Aurochs Brewing Unresulted Labs-Please follow up with your PCP about these lab tests Order Current Status HEMOGLOBIN A1C W/O EAG In process NC XR TECHNOLOGIST SERVICE In process Providers Seen During Your Hospitalization Provider Specialty Primary office phone Siri Bartlett MD Orthopedic Surgery [phone] Immunizations Administered for This Admission Name Date Influenza Vaccine (Quad) PF 9/29/2018 Your Primary Care Physician (PCP) Primary Care Physician Office Phone Office Fax  [facility], [address] [place] [address] Drive You are allergic to the following No active allergies Recent Documentation Height Weight BMI OB Status Smoking Status 1.651 m 102.1 kg 37.44 kg/m2 Postmenopausal Former Smoker Emergency Contacts Name Discharge Info Relation Home Work Mobile Bennett Jack DISCHARGE CAREGIVER [3] Spouse [3] [phone] Patient Belongings The following personal items are in your possession at time of discharge: 
  Dental Appliances: None  Visual Aid: None      Home Medications: None   Jewelry: None  Clothing: Undergarments, Footwear, Shirt, Pants    Other Valuables: Cecy Ro Discharge Instructions Attachments/References LUMBAR SPINAL FUSION: POST-OP (ENGLISH) OXYCODONE/ACETAMINOPHEN (BY MOUTH) (ENGLISH) Patient Handouts Lumbar Spinal Fusion: What to Expect at Home Your Recovery After surgery, you can expect your back to feel stiff and sore.  You may have trouble sitting or standing in one position for very long and may need pain medicine in the weeks after your surgery. It may take 4 to 6 weeks to get back to doing simple activities, such as light housework. It may take 6 months to a year for your back to get better completely. You may need to wear a back brace while your back heals. And your doctor may have you go to physical therapy. If your job doesn't require physical labor, you will probably be able to go back to work after 1 or 2 months. If your job involves light physical labor, it may take 3 to 6 months. Most people whose jobs involved heavy labor can never return to those jobs. This care sheet gives you a general idea about how long it will take for you to recover. But each person recovers at a different pace. Follow the steps below to get better as quickly as possible. How can you care for yourself at home? Activity 
  · Rest when you feel tired. Getting enough sleep will help you recover.  
  · Try to walk each day. Start by walking a little more than you did the day before. Bit by bit, increase the amount you walk. Walking boosts blood flow and helps prevent pneumonia and constipation. Walking may also decrease your muscle soreness after surgery.  
  · If advised by your doctor, you may need to avoid lifting anything that would cause excessive strain on your back. This may include a child, heavy grocery bags and milk containers, a heavy briefcase or backpack, cat litter or dog food bags, or a vacuum .  
  · Avoid strenuous activities, such as bicycle riding, jogging, weight lifting, or aerobic exercise, until your doctor says it is okay.  
  · Do not drive for 2 to 4 weeks after your surgery or until your doctor says it is okay.  
  · Avoid riding in a car for more than 30 minutes at a time for 2 to 4 weeks after surgery. If you must ride in a car for a longer distance, stop often to walk and stretch your legs.   · Try to change your position about every 30 minutes while sitting or standing. This will help decrease your back pain while you are healing.  
  · You will probably need to take at least 4 to 6 weeks off from work. It depends on the type of work you do and how you feel.  
  · You may have sex as soon as you feel able, but avoid positions that put stress on your back or cause pain. Diet 
  · You can eat your normal diet. If your stomach is upset, try bland, low-fat foods like plain rice, broiled chicken, toast, and yogurt.  
  · Drink plenty of fluids (unless your doctor tells you not to).  
  · You may notice that your bowel movements are not regular right after your surgery. This is common. Try to avoid constipation and straining with bowel movements. You may want to take a fiber supplement every day. If you have not had a bowel movement after a couple of days, ask your doctor about taking a mild laxative. Medicines 
  · Be safe with medicines. Take pain medicines exactly as directed. ¨ If the doctor gave you a prescription medicine for pain, take it as prescribed. ¨ If you are not taking a prescription pain medicine, ask your doctor if you can take an over-the-counter medicine.  
  · If your doctor prescribed antibiotics, take them as directed. Do not stop taking them just because you feel better. You need to take the full course of antibiotics.  
  · If you think your pain medicine is making you sick to your stomach: 
¨ Take your medicine after meals (unless your doctor has told you not to). ¨ Ask your doctor for a different pain medicine. Incision care 
  · You will be given specific instructions about how to care for the cuts (incisions) the doctor made. The instructions will depend on the type of materials used to close the cut.   
Exercise 
  · Do back exercises as instructed by your doctor.  
  · Your doctor may advise you to work with a physical therapist to improve the strength and flexibility of your back. Other instructions 
  · To reduce stiffness and help sore muscles, use a warm water bottle, a heating pad set on low, or a warm cloth on your back. Do not put heat right over the incision. Do not go to sleep with a heating pad on your skin. Follow-up care is a key part of your treatment and safety. Be sure to make and go to all appointments, and call your doctor if you are having problems. It's also a good idea to know your test results and keep a list of the medicines you take. When should you call for help? Call 911 anytime you think you may need emergency care. For example, call if: 
  · You passed out (lost consciousness).  
  · You have sudden chest pain and shortness of breath, or you cough up blood.  
  · You are unable to move a leg at all.  
SCHLAGLES your doctor now or seek immediate medical care if: 
  · You have pain that does not get better after you take pain pills.  
  · You have new or worse symptoms in your legs or buttocks. Symptoms may include: ¨ Numbness or tingling. ¨ Weakness. ¨ Pain.  
  · You lose bladder or bowel control.  
  · You have loose stitches, or your incision comes open.  
  · You have blood or fluid draining from the incision.  
  · You have signs of infection, such as: 
¨ Increased pain, swelling, warmth, or redness. ¨ Pus draining from the incision. ¨ A fever.  
 Watch closely for any changes in your health, and be sure to contact your doctor if: 
  · You do not have a bowel movement after taking a laxative.  
  · You are not getting better as expected. Where can you learn more? Go to http://pati-jd.info/. Enter T852 in the search box to learn more about \"Lumbar Spinal Fusion: What to Expect at Home. \" Current as of: November 29, 2017 Content Version: 11.7 © 2762-5914 [employer].  Care instructions adapted under license by [address] (which disclaims liability or warranty for this information). If you have questions about a medical condition or this instruction, always ask your healthcare professional. [address] any warranty or liability for your use of this information. Oxycodone/Acetaminophen (By mouth) Acetaminophen (i-adch-b-MIN-oh-fen), Oxycodone Hydrochloride (lo-p-RYE-done antwon-droe-KLOR-carlita) Treats moderate to moderately severe pain. This medicine is a narcotic pain reliever. Brand Name(s): Endocet, Percocet, Primlev, Xartemis XR There may be other brand names for this medicine. When This Medicine Should Not Be Used: This medicine is not right for everyone. Do not use it if you had an allergic reaction to acetaminophen or oxycodone, or if you have serious breathing problems or paralytic ileus. How to Use This Medicine:  
Capsule, Liquid, Tablet, Long Acting Tablet · Your doctor will tell you how much medicine to use. Do not use more than directed. · An overdose can be dangerous. Follow directions carefully so you do not get too much medicine at one time. · Oral liquid: Measure the oral liquid medicine with a marked measuring spoon, oral syringe, or medicine cup. · Swallow the extended-release tablet whole. Do not crush, break, or chew it. Do not lick or wet the tablet before placing it in your mouth. Do not give this medicine through a feeding tube. · This medicine should come with a Medication Guide. Ask your pharmacist for a copy if you do not have one. · Missed dose: If you miss a dose of this medicine, skip the missed dose and go back to your regular dosing schedule. Do not double doses. · Store the medicine in a closed container at room temperature, away from heat, moisture, and direct light. Ask your pharmacist about the best way to dispose of medicine you do not use. Drugs and Foods to Avoid: Ask your doctor or pharmacist before using any other medicine, including over-the-counter medicines, vitamins, and herbal products. · Do not use Xartemis XR if you are using or have used an MAO inhibitor in the past 14 days. · Some medicines can affect how this medicine works. Tell your doctor if you are using any of the following: ¨ Carbamazepine, erythromycin, ketoconazole, lamotrigine, mirtazapine, naltrexone, phenytoin, propranolol, rifampin, ritonavir, tramadol, trazodone, or zidovudine ¨ Birth control pills ¨ Diuretic (water pill) ¨ Medicine to treat depression ¨ Phenothiazine medicine ¨ Triptan medicine to treat migraine headaches · Do not drink alcohol while you are using this medicine. Acetaminophen can damage your liver, and alcohol can increase this risk. Do not take acetaminophen without asking your doctor if you have 3 or more drinks of alcohol every day. · Tell your doctor if you use anything else that makes you sleepy. Some examples are allergy medicine, narcotic pain medicine, and alcohol. Tell your doctor if you are using buprenorphine, butorphanol, nalbuphine, pentazocine, a benzodiazepine, or a muscle relaxer. Warnings While Using This Medicine: · Tell your doctor if you are pregnant or breastfeeding, or if you have kidney disease, liver disease, heart disease, low blood pressure, breathing problems or lung disease (such as asthma, COPD), thyroid problems, Sanders disease, pancreas or gallbladder problems, prostate problems, trouble urinating, or a stomach problems, or a history of head injury or brain damage, seizures, or alcohol or drug abuse. Tell your doctor if you are allergic to codeine. · This medicine may cause the following problems: 
¨ High risk of overdose, which can lead to death ¨ Respiratory depression (serious breathing problem that can be life-threatening) ¨ Liver problems ¨ Serious skin reactions ¨ Serotonin syndrome (when used with certain medicines) · This medicine may make you dizzy or drowsy. Do not drive or do anything that could be dangerous until you know how this medicine affects you. Sit or lie down if you feel dizzy. Stand up carefully. · This medicine contains acetaminophen. Read the labels of all other medicines you are using to see if they also contain acetaminophen, or ask your doctor or pharmacist. Danielito Dao not use more than 4 grams (4,000 milligrams) total of acetaminophen in one day. · This medicine can be habit-forming. Do not use more than your prescribed dose. Call your doctor if you think your medicine is not working. · Do not stop using this medicine suddenly. Your doctor will need to slowly decrease your dose before you stop it completely. · This medicine could cause infertility. Talk with your doctor before using this medicine if you plan to have children. · This medicine may cause constipation, especially with long-term use. Ask your doctor if you should use a laxative to prevent and treat constipation. · Keep all medicine out of the reach of children. Never share your medicine with anyone. Possible Side Effects While Using This Medicine:  
Call your doctor right away if you notice any of these side effects: · Allergic reaction: Itching or hives, swelling in your face or hands, swelling or tingling in your mouth or throat, chest tightness, trouble breathing · Anxiety, restlessness, fast heartbeat, fever, muscle spasms, twitching, diarrhea, seeing or hearing things that are not there · Blistering, peeling, red skin rash · Blue lips, fingernails, or skin · Dark urine or pale stools, loss of appetite, stomach pain, yellow skin or eyes · Extreme weakness, shallow breathing, uneven heartbeat, seizures, sweating, or cold or clammy skin · Severe confusion, lightheadedness, dizziness, or fainting · Severe constipation, nausea, or vomiting · Trouble breathing or slow breathing If you notice these less serious side effects, talk with your doctor:  
· Headache · Mild constipation, nausea, or vomiting · Mild sleepiness or drowsiness If you notice other side effects that you think are caused by this medicine, tell your doctor. Call your doctor for medical advice about side effects. You may report side effects to FDA at 1-436-FDA-3198 © 2017 [address] Information is for End User's use only and may not be sold, redistributed or otherwise used for commercial purposes. The above information is an  only. It is not intended as medical advice for individual conditions or treatments. Talk to your doctor, nurse or pharmacist before following any medical regimen to see if it is safe and effective for you. Please provide this summary of care documentation to your next provider. Signatures-by signing, you are acknowledging that this After Visit Summary has been reviewed with you and you have received a copy. Patient Signature:  ____________________________________________________________ Date:  ____________________________________________________________  
  
Veronica Contreras Provider Signature:  ____________________________________________________________ Date:  ____________________________________________________________

## 2018-09-27 LAB
GLUCOSE BLD STRIP.AUTO-MCNC: 166 MG/DL (ref 70–110)
GLUCOSE BLD STRIP.AUTO-MCNC: 200 MG/DL (ref 70–110)
GLUCOSE BLD STRIP.AUTO-MCNC: 224 MG/DL (ref 70–110)
GLUCOSE BLD STRIP.AUTO-MCNC: 225 MG/DL (ref 70–110)

## 2018-09-27 PROCEDURE — 65270000029 HC RM PRIVATE

## 2018-09-27 PROCEDURE — 74011636637 HC RX REV CODE- 636/637: Performed by: ORTHOPAEDIC SURGERY

## 2018-09-27 PROCEDURE — 74011250636 HC RX REV CODE- 250/636: Performed by: INTERNAL MEDICINE

## 2018-09-27 PROCEDURE — 74011250637 HC RX REV CODE- 250/637: Performed by: INTERNAL MEDICINE

## 2018-09-27 PROCEDURE — 97165 OT EVAL LOW COMPLEX 30 MIN: CPT

## 2018-09-27 PROCEDURE — 97116 GAIT TRAINING THERAPY: CPT

## 2018-09-27 PROCEDURE — 74011636637 HC RX REV CODE- 636/637: Performed by: INTERNAL MEDICINE

## 2018-09-27 PROCEDURE — 97530 THERAPEUTIC ACTIVITIES: CPT

## 2018-09-27 PROCEDURE — 82962 GLUCOSE BLOOD TEST: CPT

## 2018-09-27 PROCEDURE — 74011250636 HC RX REV CODE- 250/636: Performed by: ORTHOPAEDIC SURGERY

## 2018-09-27 RX ORDER — INSULIN GLARGINE 100 [IU]/ML
20 INJECTION, SOLUTION SUBCUTANEOUS
Status: DISCONTINUED | OUTPATIENT
Start: 2018-09-27 | End: 2018-09-29 | Stop reason: HOSPADM

## 2018-09-27 RX ORDER — INSULIN GLARGINE 100 [IU]/ML
5 INJECTION, SOLUTION SUBCUTANEOUS ONCE
Status: COMPLETED | OUTPATIENT
Start: 2018-09-27 | End: 2018-09-27

## 2018-09-27 RX ORDER — AMLODIPINE BESYLATE 5 MG/1
5 TABLET ORAL DAILY
Status: DISCONTINUED | OUTPATIENT
Start: 2018-09-28 | End: 2018-09-29 | Stop reason: HOSPADM

## 2018-09-27 RX ADMIN — OXYCODONE HYDROCHLORIDE AND ACETAMINOPHEN 2 TABLET: 10; 325 TABLET ORAL at 06:47

## 2018-09-27 RX ADMIN — INSULIN LISPRO 6 UNITS: 100 INJECTION, SOLUTION INTRAVENOUS; SUBCUTANEOUS at 18:23

## 2018-09-27 RX ADMIN — INSULIN LISPRO 6 UNITS: 100 INJECTION, SOLUTION INTRAVENOUS; SUBCUTANEOUS at 22:06

## 2018-09-27 RX ADMIN — MORPHINE SULFATE 5 MG: 10 INJECTION INTRAMUSCULAR; INTRAVENOUS; SUBCUTANEOUS at 22:09

## 2018-09-27 RX ADMIN — INSULIN LISPRO 3 UNITS: 100 INJECTION, SOLUTION INTRAVENOUS; SUBCUTANEOUS at 12:20

## 2018-09-27 RX ADMIN — INSULIN LISPRO 4 UNITS: 100 INJECTION, SOLUTION INTRAVENOUS; SUBCUTANEOUS at 08:54

## 2018-09-27 RX ADMIN — INSULIN GLARGINE 20 UNITS: 100 INJECTION, SOLUTION SUBCUTANEOUS at 22:08

## 2018-09-27 RX ADMIN — DOCUSATE SODIUM 100 MG: 100 CAPSULE, LIQUID FILLED ORAL at 08:54

## 2018-09-27 RX ADMIN — INSULIN GLARGINE 5 UNITS: 100 INJECTION, SOLUTION SUBCUTANEOUS at 12:20

## 2018-09-27 RX ADMIN — CEFAZOLIN SODIUM 2 G: 2 SOLUTION INTRAVENOUS at 03:56

## 2018-09-27 NOTE — PROGRESS NOTES
Problem: Self Care Deficits Care Plan (Adult)  Goal: *Acute Goals and Plan of Care (Insert Text)  Occupational Therapy Goals  Initiated 9/27/2018 within 7 day(s). 1.  Patient will perform grooming with supervision/set-up standing at sink. 2.  Patient will perform upper body dressing with supervision/set-up. 3.  Patient will perform lower body dressing with minimal assistance/contact guard assist.  4.  Patient will perform toilet transfers with supervision/set-up. 5.  Patient will perform all aspects of toileting with supervision/set-up. 6.  Patient will participate in upper extremity therapeutic exercise/activities with supervision/set-up for 10 minutes. 7.  Patient will utilize energy conservation techniques during functional activities with verbal, visual and tactile cues. Occupational Therapy EVALUATION    Patient: Valeriano Coyle (80 y.o. female)  Date: 9/27/2018  Primary Diagnosis: spondylolisthesis  m43.16  Lumbar stenosis  Procedure(s) (LRB):  decompression and fusion, flospine l3-5 (N/A) 1 Day Post-Op   Precautions:   Fall, Spinal  PLOF: independent with ADLs and transfers PTA. ASSESSMENT :  Based on the objective data described below, the patient presents with decreased strength, endurance and balance for carryover of ADLs and transfers. Pt participated with STS transfers and returned to chair at the end of session to eat lunch. Pt reports increase in pain when moving and with transfers but no pain at rest. Educated on lumbar precautions and to avoid prolonged sitting upright in chair at this time. Pt verbalized understanding. Patient will benefit from skilled intervention to address the above impairments.   Patients rehabilitation potential is considered to be Good  Factors which may influence rehabilitation potential include:   []             None noted  []             Mental ability/status  []             Medical condition  []             Home/family situation and support systems  [] Safety awareness  [x]             Pain tolerance/management  []             Other:     Recommendations for nursing:  Written on communication board:   Verbally communicated to:        PLAN :  Recommendations and Planned Interventions:  [x]               Self Care Training                  [x]        Therapeutic Activities  [x]               Functional Mobility Training    []        Cognitive Retraining  []               Therapeutic Exercises           []        Endurance Activities  [x]               Balance Training                   []        Neuromuscular Re-Education  []               Visual/Perceptual Training     [x]   Home Safety Training  [x]               Patient Education                 [x]        Family Training/Education  []               Other (comment):    Frequency/Duration: Patient will be followed by occupational therapy 1-2 times per day/4-7 days per week to address goals. Discharge Recommendations: Home Health  Further Equipment Recommendations for Discharge: rolling walker and 3:1 commode     SUBJECTIVE:   Patient stated I am doing alright.     OBJECTIVE DATA SUMMARY:     Past Medical History:   Diagnosis Date    Diabetes ([facility] [address]     Hypercholesteremia     Hypertension      Past Surgical History:   Procedure Laterality Date    HX ROTATOR CUFF REPAIR       Barriers to Learning/Limitations: None  Compensate with: visual, verbal, tactile, kinesthetic cues/model    GCODES:  Self Care  Current  CL= 60-79%   Goal  CI= 1-19%. The severity rating is based on the Other participation with ADLs and transfers. Eval Complexity: History: MEDIUM Complexity : Expanded review of history including physical, cognitive and psychosocial  history ; Examination: MEDIUM Complexity : 3-5 performance deficits relating to physical, cognitive , or psychosocial skils that result in activity limitations and / or participation restrictions;  Decision Making:MEDIUM Complexity : Patient may present with comorbidities that affect occupational performnce. Miniml to moderate modification of tasks or assistance (eg, physical or verbal ) with assesment(s) is necessary to enable patient to complete evaluation     Prior Level of Function/Home Situation:   Home Situation  Home Environment: Private residence  # Steps to Enter: 4  Rails to Enter: Yes  Hand Rails : Bilateral  One/Two Story Residence: One story  Living Alone: No  Support Systems: Spouse/Significant Other/Partner  Patient Expects to be Discharged to[de-identified] Private residence  Current DME Used/Available at Home: Cane, straight  Tub or Shower Type: Shower  [x]  Right hand dominant   []  Left hand dominant  Cognitive/Behavioral Status:  Neurologic State: Alert  Orientation Level: Oriented X4  Cognition: Appropriate for age attention/concentration; Follows commands  Safety/Judgement: Fall prevention  Skin: intact  Edema: none  Vision/Perceptual:    Tracking: Able to track stimulus in all quadrants w/o difficulty    Coordination:  Coordination: Within functional limits  Fine Motor Skills-Upper: Left Intact; Right Intact    Gross Motor Skills-Upper: Left Intact; Right Intact  Balance:  Sitting: Intact  Standing: Impaired  Standing - Static: Fair  Standing - Dynamic : Fair  Strength:  Strength: Generally decreased, functional  Tone & Sensation:  Sensation: Intact  Range of Motion:  AROM: Generally decreased, functional  Functional Mobility and Transfers for ADLs:  Bed Mobility:  Transfers:  Sit to Stand: Minimum assistance  ADL Assessment:  Feeding: Independent  Oral Facial Hygiene/Grooming: Setup  Upper Body Dressing: Minimum assistance  Lower Body Dressing: Maximum assistance  Toileting: Maximum assistance  ADL Intervention:  Cognitive Retraining  Safety/Judgement: Fall prevention  Therapeutic Exercise:    Pain:  Pre treatment pain level:    Post treatment pain level:   Pain Scale 1: Numeric (0 - 10)  Pain Intensity 1: 0  Pain Location 1: Back  Pain Intervention(s) 1: Medication (see MAR)  Activity Tolerance:   Fair   Please refer to the flowsheet for vital signs taken during this treatment. After treatment:   [x] Patient left in no apparent distress sitting up in chair  [] Patient left in no apparent distress in bed  [x] Call bell left within reach  [x] Nursing notified  [x] Caregiver present  [] Bed alarm activated    COMMUNICATION/EDUCATION:   [x] Home safety education was provided and the patient/caregiver indicated understanding. [x] Patient/family have participated as able in goal setting and plan of care. [x] Patient/family agree to work toward stated goals and plan of care. [] Patient understands intent and goals of therapy, but is neutral about his/her participation. [] Patient is unable to participate in goal setting and plan of care.     Thank you for this referral.  Sohail Stokes OTR/L  Time Calculation: 15 mins

## 2018-09-27 NOTE — PROGRESS NOTES
Problem: Falls - Risk of  Goal: *Absence of Falls  Document Richy Fall Risk and appropriate interventions in the flowsheet.    Outcome: Progressing Towards Goal  Fall Risk Interventions:  Mobility Interventions: Communicate number of staff needed for ambulation/transfer, Patient to call before getting OOB    Mentation Interventions: Adequate sleep, hydration, pain control, Door open when patient unattended, Update white board    Medication Interventions: Patient to call before getting OOB, Teach patient to arise slowly    Elimination Interventions: Call light in reach, Patient to call for help with toileting needs, Toilet paper/wipes in reach    History of Falls Interventions: Door open when patient unattended

## 2018-09-27 NOTE — PROGRESS NOTES
1917: Received patient in bed awake,alert and oriented x4. No signs of distress. Bed low and locked. Call bell within reach. Will monitor. 0600: In bed resting quietly,no other concerns at this time. Call bell within reach. Will monitor. 0645: D/C clarita fierro tolerated well,resting now . Encouraged to call for help. Verbalizes understanding. Family at the bedside. Will continue monitoring.

## 2018-09-27 NOTE — PROGRESS NOTES
Progress Note      Post Operative Day: 1    Assessment:    1. Status post  OH ARTHRODESIS POSTERIOR/POSTEROLATERAL LUMBAR [74806] (SPINE LUMBAR POSTERIOR INTERBODY FUSION (PLIF))  SPINE FUSN,POST TECH,EA ADDNL SGMT [39993]  LAMINEC/FACETECT/FORAMIN,LUMBAR [79633]  LAMINEC/FACETECT/FORAMIN,EACH ADDNL [89327]  SPINE FUSN,POST TECH,EA ADDNL SGMT [40668]  INSERT VERT FIX DEV,POST,3-6 SGMTS [29027]  SPIN BONE AUTOGRFT LOCAL [49446] for spondylolisthesis  m43.16  Lumbar stenosis 9/26/2018,   Progressing. PLAN:    1. Mobilize. Continue P.T.  2. Brace  3. Discharge Planning home 1-2 days           HPI: Yamila Grande is a 74 y.o. female patient without new complaints status post fusion for spondylolisthesis  m43.16  Lumbar stenosis 9/26/2018. No new orthopaedic changes. Blood pressure 114/61, pulse 88, temperature 98.3 °F (36.8 °C), resp. rate 18, height 5' 5\" (1.651 m), weight 102.1 kg (225 lb), SpO2 95 %. CBC w/Diff   Lab Results   Component Value Date/Time    WBC 7.1 08/30/2018 04:49 PM    RBC 4.35 08/30/2018 04:49 PM    HCT 37.1 08/30/2018 04:49 PM          Physical Assessment:  General: in no apparent distress   Extremities:  Neurovascular intact    Dressing:  Dry   Urologic voiding   DVT Exam:   No exam evidence to suggest DVT.  Compartments soft and NT.               - Helen Horner PA-C  9/27/2018  Office [phone]  Cell [phone]

## 2018-09-27 NOTE — PROGRESS NOTES
Care Management Interventions  PCP Verified by CM: Yes  Palliative Care Criteria Met (RRAT>21 & CHF Dx)?: No  Transition of Care Consult (CM Consult): Discharge Planning  Physical Therapy Consult: Yes  Occupational Therapy Consult: Yes  Current Support Network: Lives with Spouse  Confirm Follow Up Transport: Family  Plan discussed with Pt/Family/Caregiver: Yes  Discharge Location  Discharge Placement: Home with home health     Reason for Admission:     [address] (SPINE LUMBAR POSTERIOR INTERBODY FUSION (PLIF))                   RRAT Score:   green                  Plan for utilizing home health: yes PT                        Likelihood of Readmission:  green                         Transition of Care Plan:        Spoke with patient in room, she stated that she lives with her  and has been independent with her care, she has a walker at home that she cane use and a cane. She verified her demographics, insurance and PCP as correct on the facesheet. Patient has designated _____spouse___________________ to participate in his/her discharge plan and to receive any needed information. Brianmaddisonlencho Ly [phone]. SHe plans to return home upon discharge and transportation will be provided by family.  Plan is home with home health PT.

## 2018-09-27 NOTE — ROUTINE PROCESS
Bedside and Verbal shift change report given to ELLA Chan (oncoming nurse) by Joseph Vail (offgoing nurse). Report included the following information SBAR, Kardex, MAR and Recent Results.

## 2018-09-27 NOTE — PROGRESS NOTES
Problem: Mobility Impaired (Adult and Pediatric)  Goal: *Acute Goals and Plan of Care (Insert Text)  Physical Therapy Goals   Initiated 9/26/2018 and to be accomplished within 7 days. 1.  Patient will complete all bed mobility with modified independence in order to prepare for EOB/OOB activity. 2.  Patient will perform sit <> stand with modified independence in order to prepare for OOB/gait activity. 3.  Patient will perform bed to chair transfers with modified independence in order to promote mobility and encourage seated activity to progress towards their prior level of function. 4.  Patient will ambulate 300 feet with modified independence using LRAD in order to prepare for safe negotiation of their environment. 5.  Patient will ascend/descend 4 stairs with HR with modified independence for safe exit/entry into her home     Outcome: Progressing Towards Goal    PHYSICAL THERAPY: Daily TREATMENT Note   INPATIENT: Medicare: Hospital Day: 2     Patient: Valeriano Coyle [de-identified]74 y.o. female)    Date: 9/27/2018  Primary Diagnosis: spondylolisthesis  m43.16  Lumbar stenosis   Procedure(s) (LRB):  decompression and fusion, flospine l3-5 (N/A), 1 Day Post-Op,   Precautions: Spinal  WBAT    Chart, physical therapy assessment, plan of care and goals were reviewed. PLOF:mod I    ASSESSMENT:  Pt progressing well this am,  Instructed in spinal precautions, log rolling technique for bed mobility and sup>sit transfer. Pt with some R LE weakness, one incidence of buckling at the beginning of gait training, however able to improve stance with verbal cues for quad recruitment and walker management. Progression toward goals:        Improving appropriately and progressing toward goals       PLAN:  Patient continues to benefit from skilled intervention to address the above impairments. Continue treatment per established plan of care.     EDUCATION:   Education:  Patient was educated on the following topics: spinal precautions Barriers to Learning/Limitations: None  Compensate with: visual, verbal, tactile, kinesthetic cues/model    Discharge Recommendations:  Home Health  Further Equipment Recommendations for Discharge:  rolling walker  Factors which may impact discharge planning: none     SUBJECTIVE:   Patient stated Just sitting here I dont have much pain.     OBJECTIVE DATA SUMMARY:   Critical Behavior:  Neurologic State: Alert  Orientation Level: Oriented X4  Cognition: Follows commands  Safety/Judgement: Fall prevention    G CODE:Mobility [id number] Current  CJ= 20-39%   Goal  CI= 1-19%. The severity rating is based on the [facility] Balance [facility]  0: Pt performs 25% or less of standing activity (Max assist) CN, 100% impaired. 1: Pt supports self with upper extremities but requires therapist assistance. Pt performs 25-50% of effort (Mod assist) CM, 80% to <100% impaired. 1+: Pt supports self with upper extremities but requires therapist assistance. Pt performs >50% effort. (Min assist). CL, 60% to <80% impaired. 2: Pt supports self independently with both upper extremities (walker, crutches, parallel bars). CL, 60% to <80% impaired. 2+: Pt support self independently with 1 upper extremity (cane, crutch, 1 parallel bar). CK, 40% to <60% impaired. 3: Pt stands without upper extremity support for up to 30 seconds. CK, 40% to <60% impaired. 3+: Pt stands without upper extremity support for 30 seconds or greater. CJ, 20% to <40% impaired. 4: Pt independently moves and returns center of gravity 1-2 inches in one plane. CJ, 20% to <40% impaired. 4+: Pt independently moves and returns center of gravity 1-2 inches in multiple planes. CI, 1% to <20% impaired. 5: Pt independently moves and returns center of gravity in all planes greater than 2 inches. CH, 0% impaired.       Functional Mobility:      Functional Status      Indep   (I)   Mod I   Super-visionCGA   Min A   Mod A   Max A   Total A   Assist x2 Verbal cues Additional time Not tested   Comments   Rolling []  []  [x] []    []    []  []  [] [x] [x] []    Supine to sit []  []  [x] []  []  []  []  [] [x] [x] []    Sit to supine []  []  [x] []  []  []  []  [] [x] [x] []    Sit to stand []  []  [x] []  []  []  []  [] [x] [x] []    Stand to sit []  []  [x] []  []  []  []  [] [x] [x] []    Bed to chair transfers []  []  [x] [x]  []  []  []  [] [x] [x] []        Balance    Good   Fair   Poor   Unable   Not tested   Comments   Sitting static [x]  []  []  []  []    Sitting dynamic [x]  []  []  []  []    Standing static [x]  []  []  []  []    Standing dynamic []  [x]  []  []  [] With support     Mobility/Gait:   Level of Assistance: Contact guard assistance  Assistive Device: brace/splint and rolling walker  Distance Ambulated: 12 feet     Left Lower Extremity: WBAT  Right Lower Extremity: WBAT  Base of Support: center of gravity altered  Speed/Aissatou: pace decreased (<100 feet/min)  Step Length: left shortened and right shortened  Swing Pattern: right asymmetrical  Stance: left increased  Gait Abnormalities: decreased step clearance and step to gait    Stairs:   Level of Assistance: Unable at this time    Therapeutic Exercises:         EXERCISE   Sets   Reps   Active Active Assist   Passive Self ROM   Comments   Ankle Pumps    [] [] [] []    Quad Sets/Glut Sets   [] [] [] []    Hamstring Sets   [] [] [] []    Short Arc Quads   [] [] [] []    Heel Slides   [] [] [] []    Straight Leg Raises   [] [] [] []    Hip Abd/Add   [] [] [] []    Long Arc Quads 1 10 [x] [] [] []    Seated Marching   [] [] [] []    Standing Marching   [] [] [] []       [] [] [] []        Vital Signs  Temp: 98.3 °F (36.8 °C)     Pulse (Heart Rate): 88     BP: 114/61     Resp Rate: 18     O2 Sat (%): 95 %  Pain:  Pre treatment pain level:2  Post treatment pain level:2  Pain Scale 1: Numeric (0 - 10)  Pain Intensity 1: 0  Pain Location 1: Back     Pain Description 1: Aching  Pain Intervention(s) 1: Medication (see MAR)  Activity Tolerance:   Good      After treatment:   Patient left in no apparent distress sitting up in chair    Call bell left within reach    Caregiver present          Satya Devries PTA   Time Calculation: 14 mins

## 2018-09-27 NOTE — PROGRESS NOTES
1100 pt has sanguinous drainage to lower back dressing, dressing reinforced with 2 abd pads and tape    1930 Bedside and Verbal shift change report given to sister P  (oncoming nurse) by Marcela Robbins RN   (offgoing nurse). Report included the following information Kardex, MAR and Recent Results.

## 2018-09-27 NOTE — PROGRESS NOTES
PROGRESS NOTE    Patient: Bety Bartholomew MRN: [id number]  CSN: [id number]    YOB: 1945  Age: 74 y.o. Sex: female    DOA: 9/26/2018 LOS:  LOS: 1 day                    POSTOPERATIVE DIAGNOSIS:   Degenerative spondylolisthesis, L4-L5 with stenosis.     PROCEDURES PERFORMED:  1.  Lumbar decompression L3-L5 bilateral exploration and decompression of the L3, L4, L5 nerve roots with foraminotomy and partial facetectomy. 2.  Flow spine pedicle instrumentation L3-L5. 3.  Bilateral posterolateral spinal fusion L3-L5 local bone graft and autologous platelet factor. HPI: uneventful last 24 hrs; some drainage earlier; dressing reinforced no recurrence  Back pain controlled  Participating in PT  Shah out; voiding well  Good oral intake    ACTIVE MEDICAL PROBLEMS/PMH/PSH  T2DM dxd about 10 yrs ago; no complications; no ED or hospitalization for hypo/hyperglycemia  HTN  HLP    Seborrheic dermatitis         Menopausal syndrome    previously requiring, hormonal therapy   Tobacco abuse    History of quit in 2003   Carpal tunnel syndrome         Uterine fibroid         Venous varices         Osteopenia         Unspecified vitamin D deficiency         Diarrhea    likely IBS, neg celiac panel 5/2005 improved with diet changes   Anemia         Back pain         Hip pain         Snoring         Fatigue         Plantar fasciitis         Varicella          HX ROTATOR CUFF REPAIR    No personal or family h/o thrombophilia disorder       Hospital Problems  Never Reviewed          Codes Class Noted POA    Lumbar stenosis ICD-10-CM: M48.061  ICD-9-CM: 724.02  9/26/2018 Unknown            Patient Vitals for the past 24 hrs:   Temp Pulse Resp BP SpO2   09/27/18 1111 97.8 °F (36.6 °C) 80 18 124/71 90 %   09/27/18 0750 98.3 °F (36.8 °C) 88 18 114/61 95 %   09/27/18 0405 98.3 °F (36.8 °C) 72 16 103/61 93 %       ROS:  Constitutional: No fever. No chills . No sweats. No headache. Neck: No pain. Cardiac: no CP. SOB.  Respiratory: no cough   GI: no nausea . No vomiting. . No abdominal pain. No  BM . : No dysuria or hematuria  M/S: back pain controlled  Neuro: No Leg numbness or Weakness  Skin: no rash. No itching      PHYSICAL EXAM:  GENERAL: NAD  HEENT:     CONJUNCTIVA: [place]  LUNGS: CTA. BS Normal Bilaterally  HEART: RRR   Normal S1 S2. No Significant murmur. No S3 S4  ABDOMEN: Soft. Normal BS. No tenderness. LE: No edema.   PULSES: Radial 2+; Posterior Tibialis 2+  Back:dressing stained with old drainage      Intake/Output Summary (Last 24 hours) at 09/27/18 1909  Last data filed at 09/27/18 1416   Gross per 24 hour   Intake             2440 ml   Output             1950 ml   Net              490 ml       Current Shift:       Last three shifts:  09/26 0701 - 09/27 1900  In: 4979 [P.O.:660; I.V.:3680]  Out: 0301 [Urine:2115]    Recent Results (from the past 24 hour(s))   GLUCOSE, POC    Collection Time: 09/26/18 10:27 PM   Result Value Ref Range    Glucose (POC) 268 (H) 70 - 110 mg/dL   GLUCOSE, POC    Collection Time: 09/27/18  6:46 AM   Result Value Ref Range    Glucose (POC) 224 (H) 70 - 110 mg/dL   GLUCOSE, POC    Collection Time: 09/27/18 11:06 AM   Result Value Ref Range    Glucose (POC) 166 (H) 70 - 110 mg/dL   GLUCOSE, POC    Collection Time: 09/27/18  5:02 PM   Result Value Ref Range    Glucose (POC) 200 (H) 70 - 110 mg/dL     Lab Results   Component Value Date/Time    Glucose 75 08/30/2018 04:49 PM        ASSESSMENT   Post op pain controlled  T2DM  HTN    PLAN  D/C IVF  CBC  Tentative D/C home on saturday  Amlodipine 5 mg in am      Sayra Edge MD  9/27/2018, 7:09 PM

## 2018-09-27 NOTE — PROGRESS NOTES
Pt in bed resting alert and oriented x 4  denies pain at the moment. Assessement completed plan of care for the shift explained pt verbalized understanding. Incision on the back dressing intact with old bloody drainage. No new drainage noted. IVF infusing well, HOB elevated, bed low and in locked position. Call light and frequently used items within reach.  at bedside  Dr Lizbeth Do at bedside seeing pt  Informed about the drainage on the incision site confirmed old drainage. said to discontinue IVF. Will continue to monitor. 1- IVF discontinued pt educated on hydration with oral fluids verbalized understanding. 2145- Pt assisted to bedside commode voided then back to bed made comfortable.  at bedside. 2200- Small amount of serosanguinous drainage noted on the dressing. Dressing reiforced with ABD pad and tape. 0400- Pt awake resting in bed watching tv no concerns voiced.

## 2018-09-27 NOTE — PROGRESS NOTES
Problem: Mobility Impaired (Adult and Pediatric)  Goal: *Acute Goals and Plan of Care (Insert Text)  Physical Therapy Goals   Initiated 9/26/2018 and to be accomplished within 7 days. 1.  Patient will complete all bed mobility with modified independence in order to prepare for EOB/OOB activity. 2.  Patient will perform sit <> stand with modified independence in order to prepare for OOB/gait activity. 3.  Patient will perform bed to chair transfers with modified independence in order to promote mobility and encourage seated activity to progress towards their prior level of function. 4.  Patient will ambulate 300 feet with modified independence using LRAD in order to prepare for safe negotiation of their environment. 5.  Patient will ascend/descend 4 stairs with HR with modified independence for safe exit/entry into her home     Outcome: Progressing Towards Goal    PHYSICAL THERAPY: Daily TREATMENT Note   INPATIENT: Medicare: Hospital Day: 2     Patient: Kristi Uribe [de-identified]74 y.o. female)    Date: 9/27/2018  Primary Diagnosis: spondylolisthesis  m43.16  Lumbar stenosis   Procedure(s) (LRB):  decompression and fusion, flospine l3-5 (N/A), 1 Day Post-Op,   Precautions: Spinal  WBAT    Chart, physical therapy assessment, plan of care and goals were reviewed. PLOF:Independent    ASSESSMENT:  Pt progressing well with gait training, SBA with RW X 160 ft  With good posture and gait pattern. Some bleeding noted on bandaging, nursing notified. Progression toward goals:        Improving appropriately and progressing toward goals       PLAN:  Patient continues to benefit from skilled intervention to address the above impairments. Continue treatment per established plan of care.     EDUCATION:   Education:  Patient was educated on the following topics: spinal precautions   Barriers to Learning/Limitations: None  Compensate with: visual, verbal, tactile, kinesthetic cues/model    Discharge Recommendations:  Home Health  Further Equipment Recommendations for Discharge:  rolling walker  Factors which may impact discharge planning: none     SUBJECTIVE:   Patient stated It doesn't hurt when I sit up.     OBJECTIVE DATA SUMMARY:   Critical Behavior:  Neurologic State: Alert  Orientation Level: Oriented X4  Cognition: Follows commands  Safety/Judgement: Fall prevention    G CODE:Mobility [id number] Current  CK= 40-59%   Goal  CI= 1-19%. The severity rating is based on the Other [facility] 10 Standing Balance Scale  0: Pt performs 25% or less of standing activity (Max assist) CN, 100% impaired. 1: Pt supports self with upper extremities but requires therapist assistance. Pt performs 25-50% of effort (Mod assist) CM, 80% to <100% impaired. 1+: Pt supports self with upper extremities but requires therapist assistance. Pt performs >50% effort. (Min assist). CL, 60% to <80% impaired. 2: Pt supports self independently with both upper extremities (walker, crutches, parallel bars). CL, 60% to <80% impaired. 2+: Pt support self independently with 1 upper extremity (cane, crutch, 1 parallel bar). CK, 40% to <60% impaired. 3: Pt stands without upper extremity support for up to 30 seconds. CK, 40% to <60% impaired. 3+: Pt stands without upper extremity support for 30 seconds or greater. CJ, 20% to <40% impaired. 4: Pt independently moves and returns center of gravity 1-2 inches in one plane. CJ, 20% to <40% impaired. 4+: Pt independently moves and returns center of gravity 1-2 inches in multiple planes. CI, 1% to <20% impaired. 5: Pt independently moves and returns center of gravity in all planes greater than 2 inches. CH, 0% impaired.       Functional Mobility:      Functional Status      Indep   (I)   Mod I   Super-vision   Min A   Mod A   Max A   Total A   Assist x2 Verbal cues Additional time Not tested   Comments   Rolling []  []  [] []    []    []  []  [] [] [] [x]    Supine to sit []  []  [] []  []  []  []  [] [] [] [x]    Sit to supine []  []  [] []  []  []  []  [] [] [] [x]    Sit to stand []  []  [x] []  []  []  []  [] [] [] []    Stand to sit []  []  [x] []  []  []  []  [] [] [] []    Bed to chair transfers []  []  [] []  []  []  []  [] [] [] [x]        Balance    Good   Fair   Poor   Unable   Not tested   Comments   Sitting static [x]  []  []  []  []    Sitting dynamic [x]  []  []  []  []    Standing static [x]  []  []  []  []    Standing dynamic [x]  []  []  []  [] With suppport     Mobility/Gait:   Level of Assistance: Supervision  Assistive Device: brace/splint and rolling walker  Distance Ambulated: 160 feet     Left Lower Extremity: WBAT  Right Lower Extremity: WBAT  Base of Support: center of gravity altered  Speed/Aissatou: pace decreased (<100 feet/min)  Step Length: WFL  Swing Pattern: WFL  Stance: WFL  Gait Abnormalities: decreased step clearance    Stairs:   Level of Assistance: Not assessed at this time      Vital Signs  Temp: 97.8 °F (36.6 °C)     Pulse (Heart Rate): 80     BP: 124/71     Resp Rate: 18     O2 Sat (%): 90 %  Pain:  Pre treatment pain level:0  Post treatment pain level:0  Pain Scale 1: Numeric (0 - 10)  Pain Intensity 1: 0  Pain Location 1: Back        Pain Intervention(s) 1: Medication (see MAR)  Activity Tolerance:   Good      After treatment:   Patient left in no apparent distress sitting up in chair    Call bell left within reach  Nursing notified  Caregiver present      Geo Lewis PTA   Time Calculation: 11 mins

## 2018-09-27 NOTE — PROGRESS NOTES
Ortho rounds complete with Helen PEREZ. All questions answered, plan of care discussed. Encouraged OOB with assist, ICS, PT/OT. Nursing to follow, primary nurse updated.

## 2018-09-28 LAB
BASOPHILS # BLD: 0 K/UL (ref 0–0.1)
BASOPHILS NFR BLD: 0 % (ref 0–2)
DIFFERENTIAL METHOD BLD: ABNORMAL
EOSINOPHIL # BLD: 0 K/UL (ref 0–0.4)
EOSINOPHIL NFR BLD: 0 % (ref 0–5)
ERYTHROCYTE [DISTWIDTH] IN BLOOD BY AUTOMATED COUNT: 14.3 % (ref 11.6–14.5)
GLUCOSE BLD STRIP.AUTO-MCNC: 122 MG/DL (ref 70–110)
GLUCOSE BLD STRIP.AUTO-MCNC: 153 MG/DL (ref 70–110)
GLUCOSE BLD STRIP.AUTO-MCNC: 159 MG/DL (ref 70–110)
GLUCOSE BLD STRIP.AUTO-MCNC: 166 MG/DL (ref 70–110)
HCT VFR BLD AUTO: 27.3 % (ref 35–45)
HGB BLD-MCNC: 8.9 G/DL (ref 12–16)
LYMPHOCYTES # BLD: 1.7 K/UL (ref 0.9–3.6)
LYMPHOCYTES NFR BLD: 13 % (ref 21–52)
MCH RBC QN AUTO: 27.5 PG (ref 24–34)
MCHC RBC AUTO-ENTMCNC: 32.6 G/DL (ref 31–37)
MCV RBC AUTO: 84.3 FL (ref 74–97)
MONOCYTES # BLD: 1.8 K/UL (ref 0.05–1.2)
MONOCYTES NFR BLD: 14 % (ref 3–10)
NEUTS SEG # BLD: 9.3 K/UL (ref 1.8–8)
NEUTS SEG NFR BLD: 73 % (ref 40–73)
PLATELET # BLD AUTO: 182 K/UL (ref 135–420)
PMV BLD AUTO: 9.8 FL (ref 9.2–11.8)
RBC # BLD AUTO: 3.24 M/UL (ref 4.2–5.3)
WBC # BLD AUTO: 12.8 K/UL (ref 4.6–13.2)

## 2018-09-28 PROCEDURE — 77030011256 HC DRSG MEPILEX <16IN NO BORD MOLN -A

## 2018-09-28 PROCEDURE — 85025 COMPLETE CBC W/AUTO DIFF WBC: CPT | Performed by: INTERNAL MEDICINE

## 2018-09-28 PROCEDURE — 97116 GAIT TRAINING THERAPY: CPT

## 2018-09-28 PROCEDURE — 74011636637 HC RX REV CODE- 636/637: Performed by: ORTHOPAEDIC SURGERY

## 2018-09-28 PROCEDURE — 74011250637 HC RX REV CODE- 250/637: Performed by: INTERNAL MEDICINE

## 2018-09-28 PROCEDURE — 36415 COLL VENOUS BLD VENIPUNCTURE: CPT | Performed by: INTERNAL MEDICINE

## 2018-09-28 PROCEDURE — 65270000029 HC RM PRIVATE

## 2018-09-28 PROCEDURE — 97535 SELF CARE MNGMENT TRAINING: CPT

## 2018-09-28 PROCEDURE — 82962 GLUCOSE BLOOD TEST: CPT

## 2018-09-28 RX ADMIN — OXYCODONE HYDROCHLORIDE AND ACETAMINOPHEN 1 TABLET: 10; 325 TABLET ORAL at 19:54

## 2018-09-28 RX ADMIN — DOCUSATE SODIUM 100 MG: 100 CAPSULE, LIQUID FILLED ORAL at 09:46

## 2018-09-28 RX ADMIN — AMLODIPINE BESYLATE 5 MG: 5 TABLET ORAL at 09:46

## 2018-09-28 RX ADMIN — INSULIN LISPRO 3 UNITS: 100 INJECTION, SOLUTION INTRAVENOUS; SUBCUTANEOUS at 17:37

## 2018-09-28 RX ADMIN — INSULIN LISPRO 3 UNITS: 100 INJECTION, SOLUTION INTRAVENOUS; SUBCUTANEOUS at 12:56

## 2018-09-28 RX ADMIN — INSULIN LISPRO 3 UNITS: 100 INJECTION, SOLUTION INTRAVENOUS; SUBCUTANEOUS at 22:24

## 2018-09-28 RX ADMIN — INSULIN GLARGINE 20 UNITS: 100 INJECTION, SOLUTION SUBCUTANEOUS at 22:25

## 2018-09-28 RX ADMIN — OXYCODONE HYDROCHLORIDE AND ACETAMINOPHEN 1 TABLET: 10; 325 TABLET ORAL at 00:39

## 2018-09-28 RX ADMIN — OXYCODONE HYDROCHLORIDE AND ACETAMINOPHEN 1 TABLET: 10; 325 TABLET ORAL at 05:08

## 2018-09-28 NOTE — PROGRESS NOTES
Bedside report given by Richard Torres RN. Pt in bed A&OX4, bed locked at lowest position, call bell in reach. Pt surgical dressing to back intact, no drainage noted at this time. Pt IV site to [facility] is c/d/i, no swelling, erythema or infiltration noted at this time. Pt shows no signs of distress at this time and has no pain at this time. Will continue to monitor. 1800- No change during shift. 1945 . Bedside and Verbal shift change report given to [address] [place] [address] (oncoming nurse) by Yusef Mock (offgoing nurse). Report included the following information SBAR, Kardex and MAR.

## 2018-09-28 NOTE — PROGRESS NOTES
1032/1140: Attempted to see patient twice for morning session. Patient working with OT during first attempt. Patient with visitor requesting to participate later during second attempt. Will f/u for afternoon session.      Matt Kraft PT, DPT  Office extension: [phone]  Pager #: [phone]

## 2018-09-28 NOTE — PROGRESS NOTES
Problem: Mobility Impaired (Adult and Pediatric)  Goal: *Acute Goals and Plan of Care (Insert Text)  Physical Therapy Goals   Initiated 9/26/2018 and to be accomplished within 7 days. 1.  Patient will complete all bed mobility with modified independence in order to prepare for EOB/OOB activity. 2.  Patient will perform sit <> stand with modified independence in order to prepare for OOB/gait activity. 3.  Patient will perform bed to chair transfers with modified independence in order to promote mobility and encourage seated activity to progress towards their prior level of function. 4.  Patient will ambulate 300 feet with modified independence using LRAD in order to prepare for safe negotiation of their environment. 5.  Patient will ascend/descend 4 stairs with HR with modified independence for safe exit/entry into her home     Outcome: Progressing Towards Goal    PHYSICAL THERAPY: Daily TREATMENT Note   INPATIENT: Medicare: Hospital Day: 3     Patient: Phyllis Brooke [de-identified]74 y.o. female)    Date: 9/28/2018  Primary Diagnosis: spondylolisthesis  m43.16  Lumbar stenosis   Procedure(s) (LRB):  decompression and fusion, flospine l3-5 (N/A), 2 Days Post-Op,   Precautions: Fall, Spinal    Chart, physical therapy assessment, plan of care and goals were reviewed. PLOF: Independent     ASSESSMENT:  Patient sitting up in recliner, agrees to PT. Supervision for sit <> stand and ambulation x 100 ft with RW. Ascends/descends 4 stairs using L HR and step to pattern with CGA Gait steady. Returned to room and assisted to restroom. Left supine in bed with all needs within reach. Progression toward goals:        Improving appropriately and progressing toward goals         PLAN:  Patient continues to benefit from skilled intervention to address the above impairments. Continue treatment per established plan of care. EDUCATION:   Education:  Patient was educated on the following topics: stair training.     Barriers to Learning/Limitations: None  Compensate with: visual, verbal, tactile, kinesthetic cues/model    Discharge Recommendations:  Home Health  Further Equipment Recommendations for Discharge:  rolling walker  Factors which may impact discharge planning: N/A     SUBJECTIVE:   Patient stated I get a lot of visitors.     OBJECTIVE DATA SUMMARY:   Critical Behavior:  Neurologic State: Alert  Orientation Level: Oriented X4  Cognition: Follows commands  Safety/Judgement: Fall prevention    G CODE:Mobility [id number] Current  CI= 1-19%   Goal  CI= 1-19%.   The severity rating is based on the Other Patient able to ambulate with steady gait using RW, able to ascend/descend stairs    Functional Mobility:      Functional Status      Indep   (I)   Mod I   Super-vision   Min A   Mod A   Max A   Total A   Assist x2 Verbal cues Additional time Not tested   Comments   Rolling []  []  [] []    []    []  []  [] [] [] [x]    Supine to sit []  []  [] []  []  []  []  [] [] [] [x]    Sit to supine []  []  [x] []  []  []  []  [] [] [] []    Sit to stand []  []  [x] []  []  []  []  [] [] [] []    Stand to sit []  []  [x] []  []  []  []  [] [] [] []    Bed to chair transfers []  []  [] []  []  []  []  [] [] [] [x]        Balance    Good   Fair   Poor   Unable   Not tested   Comments   Sitting static [x]  []  []  []  []    Sitting dynamic [x]  []  []  []  []    Standing static []  [x]  []  []  []    Standing dynamic []  [x]  []  []  []      Mobility/Gait:   Level of Assistance: Supervision  Assistive Device: rolling walker  Distance Ambulated: 100 feet     Left Lower Extremity: FWB  Right Lower Extremity: FWB  Base of Support: center of gravity altered  Speed/Aissatou: pace decreased (<100 feet/min)  Step Length: WFL  Swing Pattern: WFL  Stance: WFL  Gait Abnormalities: altered arm swing    Stairs:   Level of Assistance: Contact guard assistance  Assistive Device: rolling walker  Rail Use: left  Number of Stairs: 4      Vital Signs  Temp: 98.1 °F (36.7 °C)     Pulse (Heart Rate): 98     BP: 109/61     Resp Rate: 16     O2 Sat (%): 90 %     Pain:none    Activity Tolerance:   Fair      After treatment:   Patient left in no apparent distress in bed  Call bell left within reach      Marnie REDMOND Moo   Time Calculation: 11 mins

## 2018-09-28 NOTE — PROGRESS NOTES
PROGRESS NOTE    Patient: Jose Barboza MRN: [id number]  CSN: [id number]    YOB: 1945  Age: 74 y.o. Sex: female    DOA: 9/26/2018 LOS:  LOS: 2 days                    POSTOPERATIVE DIAGNOSIS:   Degenerative spondylolisthesis, L4-L5 with stenosis.     PROCEDURES PERFORMED:  1.  Lumbar decompression L3-L5 bilateral exploration and decompression of the L3, L4, L5 nerve roots with foraminotomy and partial facetectomy. 2.  Flow spine pedicle instrumentation L3-L5. 3.  Bilateral posterolateral spinal fusion L3-L5 local bone graft and autologous platelet factor.     HPI: uneventful last 24 hrs; no new draianage  Back pain controlled  Participating in PT  Shah out; voiding well  Good oral intake bit nauseated after eating no vomiting no abdominal pain; no flatus or bm    ACTIVE MEDICAL PROBLEMS/PMH/PSH  T2DM dxd about 10 yrs ago; no complications; no ED or hospitalization for hypo/hyperglycemia  HTN  HLP    Seborrheic dermatitis         Menopausal syndrome    previously requiring, hormonal therapy   Tobacco abuse    History of quit in 2003   Carpal tunnel syndrome         Uterine fibroid         Venous varices         Osteopenia         Unspecified vitamin D deficiency         Diarrhea    likely IBS, neg celiac panel 5/2005 improved with diet changes   Anemia         Back pain         Hip pain         Snoring         Fatigue         Plantar fasciitis         Varicella          HX ROTATOR CUFF REPAIR    No personal or family h/o thrombophilia disorder       Hospital Problems  Never Reviewed          Codes Class Noted POA    Lumbar stenosis ICD-10-CM: M48.061  ICD-9-CM: 724.02  9/26/2018 Unknown            Patient Vitals for the past 24 hrs:   Temp Pulse Resp BP SpO2   09/28/18 1410 98.1 °F (36.7 °C) 98 16 109/61 90 %   09/28/18 0743 98.1 °F (36.7 °C) 98 16 102/60 90 %   09/28/18 0400 98.2 °F (36.8 °C) 100 15 123/72 94 %   09/28/18 0045 99.8 °F (37.7 °C) (!) 101 16 135/70 95 %   09/27/18 2049 98.9 °F (37.2 °C) (!) 103 18 126/65 93 %       ROS:  Constitutional: No headache. Cardiac: no CP. SOB. Respiratory: no cough   GI: + nausea . No vomiting. . No abdominal pain. No  BM . : No dysuria or hematuria  M/S: back pain controlled  Neuro: No Leg numbness or Weakness  Skin: no rash. No itching      PHYSICAL EXAM:  GENERAL: NAD  HEENT:     CONJUNCTIVA: [place]  LUNGS: CTA. BS Normal Bilaterally  HEART: RRR   Normal S1 S2. No Significant murmur. No S3 S4  ABDOMEN: Soft. Normal BS. No tenderness. LE: No edema. PULSES: Radial 2+; Posterior Tibialis 2+      Intake/Output Summary (Last 24 hours) at 09/28/18 1531  Last data filed at 09/28/18 0953   Gross per 24 hour   Intake          2058. 33 ml   Output              860 ml   Net          1198.33 ml       Current Shift:  09/28 0701 - 09/28 1900  In: 0   Out: 210 [Urine:210]    Last three shifts:  09/26 1901 - 09/28 0700  In: 4498.3 [P.O.:1500; I.V.:2998.3]  Out: 2600 [Urine:2600]    Recent Results (from the past 24 hour(s))   GLUCOSE, POC    Collection Time: 09/27/18  5:02 PM   Result Value Ref Range    Glucose (POC) 200 (H) 70 - 110 mg/dL   GLUCOSE, POC    Collection Time: 09/27/18  9:14 PM   Result Value Ref Range    Glucose (POC) 225 (H) 70 - 110 mg/dL   CBC WITH AUTOMATED DIFF    Collection Time: 09/28/18  5:05 AM   Result Value Ref Range    WBC 12.8 4.6 - 13.2 K/uL    RBC 3.24 (L) 4.20 - 5.30 M/uL    HGB 8.9 (L) 12.0 - 16.0 g/dL    HCT 27.3 (L) 35.0 - 45.0 %    MCV 84.3 74.0 - 97.0 FL    MCH 27.5 24.0 - 34.0 PG    MCHC 32.6 31.0 - 37.0 g/dL    RDW 14.3 11.6 - 14.5 %    PLATELET 817 031 - 820 K/uL    MPV 9.8 9.2 - 11.8 FL    NEUTROPHILS 73 40 - 73 %    LYMPHOCYTES 13 (L) 21 - 52 %    MONOCYTES 14 (H) 3 - 10 %    EOSINOPHILS 0 0 - 5 %    BASOPHILS 0 0 - 2 %    ABS. NEUTROPHILS 9.3 (H) 1.8 - 8.0 K/UL    ABS. LYMPHOCYTES 1.7 0.9 - 3.6 K/UL    ABS. MONOCYTES 1.8 (H) 0.05 - 1.2 K/UL    ABS. EOSINOPHILS 0.0 0.0 - 0.4 K/UL    ABS.  BASOPHILS 0.0 0.0 - 0.1 K/UL    DF AUTOMATED     GLUCOSE, POC    Collection Time: 09/28/18  7:04 AM   Result Value Ref Range    Glucose (POC) 122 (H) 70 - 110 mg/dL   GLUCOSE, POC    Collection Time: 09/28/18 11:34 AM   Result Value Ref Range    Glucose (POC) 153 (H) 70 - 110 mg/dL     Lab Results   Component Value Date/Time    Glucose 75 08/30/2018 04:49 PM        ASSESSMENT   Post op pain controlled  Post op anemia  T2DM  HTN    PLAN  Continue same meds  CBC in AM  Tentative D/C home tomorrow        Eliceo Benitez MD  9/28/2018, 7:09 PM

## 2018-09-28 NOTE — PROGRESS NOTES
Offered the pt FOC for home care, she chose [facility]. Pt verified the contact information on the face sheet is correct. Referral called to intake nurse,  Key Bernabe, as a w/e pending dc. Delivered the pt a rolling walker for the [facility] [employer]. Faxed the referral and signed delivery ticket to [facility] for processing. Care Management Interventions  PCP Verified by CM:  Yes  Palliative Care Criteria Met (RRAT>21 & CHF Dx)?: No  Transition of Care Consult (CM Consult): [address]: Yes  Physical Therapy Consult: Yes  Occupational Therapy Consult: Yes  Current Support Network: Lives with Spouse  Confirm Follow Up Transport: Family  Plan discussed with Pt/Family/Caregiver: Yes  Freedom of Choice Offered: Yes  Discharge Location  Discharge Placement: Home with home health

## 2018-09-28 NOTE — PROGRESS NOTES
Bedside shift change report given to Harjinder Walter RN (oncoming nurse) by Charlie Mahmood RN (offgoing nurse). Report included the following information SBAR.    1952: Ambulated to void, voiding well, dressing change to back, tolerated well, Mepilex applied to small skin tear from tape to R buttock, medicated for pain. Bedside shift change report given to Sunil Rao RN (oncoming nurse) by Harjinder Walter RN (offgoing nurse). Report included the following information SBAR.

## 2018-09-28 NOTE — PROGRESS NOTES
Progress Note      Post Operative Day: 2    Assessment:    1. Status post  RI ARTHRODESIS POSTERIOR/POSTEROLATERAL LUMBAR [41170] (SPINE LUMBAR POSTERIOR INTERBODY FUSION (PLIF))  SPINE FUSN,POST TECH,EA ADDNL SGMT [66135]  LAMINEC/FACETECT/FORAMIN,LUMBAR [82161]  LAMINEC/FACETECT/FORAMIN,EACH ADDNL [98793]  SPINE FUSN,POST TECH,EA ADDNL SGMT [45068]  INSERT VERT FIX DEV,POST,3-6 SGMTS [46585]  SPIN BONE AUTOGRFT LOCAL [80122] for spondylolisthesis  m43.16  Lumbar stenosis 9/26/2018,   Progressing. PLAN:    1. Mobilize. Continue P.T.  2. Brace  3. Discharge Planning home tomorrow            HPI: Charly Guillaume is a 74 y.o. female patient without new complaints status post fusion for spondylolisthesis  m43.16  Lumbar stenosis 9/26/2018. No new orthopaedic changes. Blood pressure 102/60, pulse 98, temperature 98.1 °F (36.7 °C), resp. rate 16, height 5' 5\" (1.651 m), weight 102.1 kg (225 lb), SpO2 90 %. CBC w/Diff   Lab Results   Component Value Date/Time    WBC 12.8 09/28/2018 05:05 AM    RBC 3.24 (L) 09/28/2018 05:05 AM    HCT 27.3 (L) 09/28/2018 05:05 AM          Physical Assessment:  General: in no apparent distress   Extremities:  Neurovascular intact    Dressing: Moderate SA drainage    Urologic voiding   DVT Exam:   No exam evidence to suggest DVT.  Compartments soft and NT.               - Helen Horner PA-C  9/28/2018  Office [phone]  Cell [phone]

## 2018-09-28 NOTE — ROUTINE PROCESS
Bedside and Verbal shift change report given to Silva Love RN (oncoming nurse) by Juanpablo Lozano RN (offgoing nurse). Report included the following information SBAR, Kardex, Intake/Output, MAR and Recent Results.

## 2018-09-29 ENCOUNTER — HOME HEALTH ADMISSION (OUTPATIENT)
Dept: HOME HEALTH SERVICES | Facility: HOME HEALTH | Age: 73
End: 2018-09-29
Payer: MEDICARE

## 2018-09-29 VITALS
HEART RATE: 94 BPM | WEIGHT: 225 LBS | OXYGEN SATURATION: 92 % | TEMPERATURE: 97.5 F | DIASTOLIC BLOOD PRESSURE: 68 MMHG | BODY MASS INDEX: 37.49 KG/M2 | RESPIRATION RATE: 16 BRPM | SYSTOLIC BLOOD PRESSURE: 156 MMHG | HEIGHT: 65 IN

## 2018-09-29 LAB
BASOPHILS # BLD: 0 K/UL (ref 0–0.1)
BASOPHILS NFR BLD: 0 % (ref 0–2)
DIFFERENTIAL METHOD BLD: ABNORMAL
EOSINOPHIL # BLD: 0.1 K/UL (ref 0–0.4)
EOSINOPHIL NFR BLD: 1 % (ref 0–5)
ERYTHROCYTE [DISTWIDTH] IN BLOOD BY AUTOMATED COUNT: 13.9 % (ref 11.6–14.5)
GLUCOSE BLD STRIP.AUTO-MCNC: 114 MG/DL (ref 70–110)
GLUCOSE BLD STRIP.AUTO-MCNC: 211 MG/DL (ref 70–110)
HBA1C MFR BLD: 7.4 % (ref 4.2–5.6)
HCT VFR BLD AUTO: 26.6 % (ref 35–45)
HGB BLD-MCNC: 8.9 G/DL (ref 12–16)
LYMPHOCYTES # BLD: 1.9 K/UL (ref 0.9–3.6)
LYMPHOCYTES NFR BLD: 18 % (ref 21–52)
MCH RBC QN AUTO: 28 PG (ref 24–34)
MCHC RBC AUTO-ENTMCNC: 33.5 G/DL (ref 31–37)
MCV RBC AUTO: 83.6 FL (ref 74–97)
MONOCYTES # BLD: 1.3 K/UL (ref 0.05–1.2)
MONOCYTES NFR BLD: 12 % (ref 3–10)
NEUTS SEG # BLD: 7.4 K/UL (ref 1.8–8)
NEUTS SEG NFR BLD: 69 % (ref 40–73)
PLATELET # BLD AUTO: 195 K/UL (ref 135–420)
PMV BLD AUTO: 10.6 FL (ref 9.2–11.8)
RBC # BLD AUTO: 3.18 M/UL (ref 4.2–5.3)
WBC # BLD AUTO: 10.7 K/UL (ref 4.6–13.2)

## 2018-09-29 PROCEDURE — 74011636637 HC RX REV CODE- 636/637: Performed by: ORTHOPAEDIC SURGERY

## 2018-09-29 PROCEDURE — 36415 COLL VENOUS BLD VENIPUNCTURE: CPT | Performed by: INTERNAL MEDICINE

## 2018-09-29 PROCEDURE — 97116 GAIT TRAINING THERAPY: CPT

## 2018-09-29 PROCEDURE — 90686 IIV4 VACC NO PRSV 0.5 ML IM: CPT | Performed by: ORTHOPAEDIC SURGERY

## 2018-09-29 PROCEDURE — 83036 HEMOGLOBIN GLYCOSYLATED A1C: CPT | Performed by: ORTHOPAEDIC SURGERY

## 2018-09-29 PROCEDURE — 77030011256 HC DRSG MEPILEX <16IN NO BORD MOLN -A

## 2018-09-29 PROCEDURE — 85025 COMPLETE CBC W/AUTO DIFF WBC: CPT | Performed by: INTERNAL MEDICINE

## 2018-09-29 PROCEDURE — 74011250637 HC RX REV CODE- 250/637: Performed by: INTERNAL MEDICINE

## 2018-09-29 PROCEDURE — 82962 GLUCOSE BLOOD TEST: CPT

## 2018-09-29 PROCEDURE — 74011250636 HC RX REV CODE- 250/636: Performed by: ORTHOPAEDIC SURGERY

## 2018-09-29 PROCEDURE — 90471 IMMUNIZATION ADMIN: CPT

## 2018-09-29 RX ADMIN — INFLUENZA VIRUS VACCINE 0.5 ML: 15; 15; 15; 15 SUSPENSION INTRAMUSCULAR at 13:51

## 2018-09-29 RX ADMIN — OXYCODONE HYDROCHLORIDE AND ACETAMINOPHEN 1 TABLET: 10; 325 TABLET ORAL at 08:27

## 2018-09-29 RX ADMIN — DOCUSATE SODIUM 100 MG: 100 CAPSULE, LIQUID FILLED ORAL at 08:33

## 2018-09-29 RX ADMIN — INSULIN LISPRO 6 UNITS: 100 INJECTION, SOLUTION INTRAVENOUS; SUBCUTANEOUS at 13:51

## 2018-09-29 RX ADMIN — OXYCODONE HYDROCHLORIDE AND ACETAMINOPHEN 1 TABLET: 10; 325 TABLET ORAL at 13:00

## 2018-09-29 RX ADMIN — AMLODIPINE BESYLATE 5 MG: 5 TABLET ORAL at 08:36

## 2018-09-29 NOTE — DISCHARGE INSTRUCTIONS
DISCHARGE SUMMARY from Nurse    PATIENT INSTRUCTIONS:    After general anesthesia or intravenous sedation, for 24 hours or while taking prescription Narcotics:  · Limit your activities  · Do not drive and operate hazardous machinery  · Do not make important personal or business decisions  · Do  not drink alcoholic beverages  · If you have not urinated within 8 hours after discharge, please contact your surgeon on call. Report the following to your surgeon:  · Excessive pain, swelling, redness or odor of or around the surgical area  · Temperature over 100.5  · Nausea and vomiting lasting longer than 4 hours or if unable to take medications  · Any signs of decreased circulation or nerve impairment to extremity: change in color, persistent  numbness, tingling, coldness or increase pain  · Any questions    What to do at Home:  Recommended activity: Activity as tolerated, no driving while on pain medication. If you experience any of the following symptoms: fever, increased pain, excessive bleeding, rednessplease follow up with Dr. Mi Kennedy. *  Please give a list of your current medications to your Primary Care Provider. *  Please update this list whenever your medications are discontinued, doses are      changed, or new medications (including over-the-counter products) are added. *  Please carry medication information at all times in case of emergency situations. These are general instructions for a healthy lifestyle:    No smoking/ No tobacco products/ Avoid exposure to second hand smoke  Surgeon General's Warning:  Quitting smoking now greatly reduces serious risk to your health.     Obesity, smoking, and sedentary lifestyle greatly increases your risk for illness    A healthy diet, regular physical exercise & weight monitoring are important for maintaining a healthy lifestyle    You may be retaining fluid if you have a history of heart failure or if you experience any of the following symptoms:  Weight gain of 3 pounds or more overnight or 5 pounds in a week, increased swelling in our hands or feet or shortness of breath while lying flat in bed. Please call your doctor as soon as you notice any of these symptoms; do not wait until your next office visit. Recognize signs and symptoms of STROKE:    F-face looks uneven    A-arms unable to move or move unevenly    S-speech slurred or non-existent    T-time-call 911 as soon as signs and symptoms begin-DO NOT go       Back to bed or wait to see if you get better-TIME IS BRAIN. Warning Signs of HEART ATTACK     Call 911 if you have these symptoms:   Chest discomfort. Most heart attacks involve discomfort in the center of the chest that lasts more than a few minutes, or that goes away and comes back. It can feel like uncomfortable pressure, squeezing, fullness, or pain.  Discomfort in other areas of the upper body. Symptoms can include pain or discomfort in one or both arms, the back, neck, jaw, or stomach.  Shortness of breath with or without chest discomfort.  Other signs may include breaking out in a cold sweat, nausea, or lightheadedness. Don't wait more than five minutes to call 911 - MINUTES MATTER! Fast action can save your life. Calling 911 is almost always the fastest way to get lifesaving treatment. Emergency Medical Services staff can begin treatment when they arrive -- up to an hour sooner than if someone gets to the hospital by car. [employer] Activation    Thank you for requesting access to [employer]. Please follow the instructions below to securely access and download your online medical record. [employer] allows you to send messages to your doctor, view your test results, renew your prescriptions, schedule appointments, and more. How Do I Sign Up? 1. In your internet browser, go to [email or web address]  2. Click on the First Time User? Click Here link in the Sign In box. You will be redirect to the New Member Sign Up page.   3. Enter your Rapidlea Access Code exactly as it appears below. You will not need to use this code after youve completed the sign-up process. If you do not sign up before the expiration date, you must request a new code. Rapidlea Access Code: [email or web address]  Expires: 2018  4:37 PM (This is the date your Rapidlea access code will )    4. Enter the last four digits of your Social Security Number (xxxx) and Date of Birth (mm/dd/yyyy) as indicated and click Submit. You will be taken to the next sign-up page. 5. Create a [employer] ID. This will be your Rapidlea login ID and cannot be changed, so think of one that is secure and easy to remember. 6. Create a Rapidlea password. You can change your password at any time. 7. Enter your Password Reset Question and Answer. This can be used at a later time if you forget your password. 8. Enter your e-mail address. You will receive e-mail notification when new information is available in [address] 9. Click Sign Up. You can now view and download portions of your medical record. 10. Click the Download Summary menu link to download a portable copy of your medical information. Additional Information    If you have questions, please visit the Frequently Asked Questions section of the Rapidlea website at [email or web address] Remember, [employer] is NOT to be used for urgent needs. For medical emergencies, dial 911. Patient armband removed and shredded      The discharge information has been reviewed with the patient and spouse. The patient and spouse verbalized understanding. Discharge medications reviewed with the patient and spouse and appropriate educational materials and side effects teaching were provided.   ___________________________________________________________________________________________________________________________________

## 2018-09-29 NOTE — PROGRESS NOTES
Name: Tony Fullamira  Date:   09/29/2018  Admit Date: 9/26/2018      Subjective:     Pain:  yes  Nausea:  no  Itching:  no  Numbness:  no  Other c/o: Stable for DC; working with PT          Objective:   .lastbp  BP Readings from Last 1 Encounters:   09/29/18 153/72     Pulse Readings from Last 1 Encounters:   09/29/18 98     Resp Readings from Last 1 Encounters:   09/29/18 16     Temp Readings from Last 1 Encounters:   09/29/18 98.1 °F (36.7 °C)         Intake/Output Summary (Last 24 hours) at 09/29/18 1004  Last data filed at 09/29/18 0440   Gross per 24 hour   Intake              240 ml   Output             1800 ml   Net            -1560 ml       Spencer: normal 5/5 strength in all tested muscle groups, no sensory deficits noted  Wound: well approximated incision    Data Review:    CBC w/Diff    Lab Results   Component Value Date/Time    WBC 10.7 09/29/2018 05:09 AM    RBC 3.18 (L) 09/29/2018 05:09 AM    MCV 83.6 09/29/2018 05:09 AM    MCH 28.0 09/29/2018 05:09 AM    MCHC 33.5 09/29/2018 05:09 AM    RDW 13.9 09/29/2018 05:09 AM    No components found for: RELNEU, RELLYM, RELMONO, RELEOS, RELBAS, NEUTROPHIL, LYMPHOCYTE, MONOCYTES, EOSINOPHILS, BASOPHILS     No results found for: INR    Assessment:     Active Problems:    Lumbar stenosis (9/26/2018)    Stable S/P L3-5 TLIF    Assessment: within normal limits    Plan:     Continue supportive care. Mobilize as tolerated. Pain controlled; home today; adaptive equipment at bedside; fu Dr. Betsy Sow 10/22/2018 (Pt. Has appt. )

## 2018-09-29 NOTE — PROGRESS NOTES
PROGRESS NOTE    Patient: Charly Guillaume MRN: [id number]  CSN: [id number]    YOB: 1945  Age: 74 y.o. Sex: female    DOA: 9/26/2018 LOS:  LOS: 3 days                    POSTOPERATIVE DIAGNOSIS:   Degenerative spondylolisthesis, L4-L5 with stenosis.     PROCEDURES PERFORMED:  1.  Lumbar decompression L3-L5 bilateral exploration and decompression of the L3, L4, L5 nerve roots with foraminotomy and partial facetectomy. 2.  Flow spine pedicle instrumentation L3-L5. 3.  Bilateral posterolateral spinal fusion L3-L5 local bone graft and autologous platelet factor. HPI: uneventful last 24 hrs; no draianage  Back pain controlled  Participating in PT  Tolerating diet  No N/V no abdominal pain; some flatus.  No bm    ACTIVE MEDICAL PROBLEMS/PMH/PSH  T2DM dxd about 10 yrs ago; no complications; no ED or hospitalization for hypo/hyperglycemia  HTN  HLP    Seborrheic dermatitis         Menopausal syndrome    previously requiring, hormonal therapy   Tobacco abuse    History of quit in 2003   Carpal tunnel syndrome         Uterine fibroid         Venous varices         Osteopenia         Unspecified vitamin D deficiency         Diarrhea    likely IBS, neg celiac panel 5/2005 improved with diet changes   Anemia         Back pain         Hip pain         Snoring         Fatigue         Plantar fasciitis         Varicella          HX ROTATOR CUFF REPAIR    No personal or family h/o thrombophilia disorder       Hospital Problems  Date Reviewed: 9/29/2018          Codes Class Noted POA    Lumbar stenosis ICD-10-CM: M48.061  ICD-9-CM: 724.02  9/26/2018 Unknown            Patient Vitals for the past 24 hrs:   Temp Pulse Resp BP SpO2   09/29/18 0834 98.1 °F (36.7 °C) 98 16 153/72 93 %   09/29/18 0439 97.6 °F (36.4 °C) (!) 104 16 145/66 95 %   09/29/18 0018 98.9 °F (37.2 °C) 98 16 128/66 95 %   09/28/18 2018 97.9 °F (36.6 °C) (!) 109 16 154/63 93 %   09/28/18 1410 98.1 °F (36.7 °C) 98 16 109/61 90 % ROS:  Constitutional: No headache. Cardiac: no CP. SOB. Respiratory: no cough   GI: HPI   : No dysuria or hematuria  M/S: back pain controlled  Neuro: No Leg numbness or Weakness  Skin: no rash. No itching      PHYSICAL EXAM:  GENERAL: NAD  HEENT:     CONJUNCTIVA: [place]  LUNGS: CTA. BS Normal Bilaterally  HEART: RRR   Normal S1 S2. No Significant murmur. No S3 S4  ABDOMEN: Soft. Normal BS. No tenderness. LE: No edema. Intake/Output Summary (Last 24 hours) at 09/29/18 1319  Last data filed at 09/29/18 0440   Gross per 24 hour   Intake              240 ml   Output             1800 ml   Net            -1560 ml       Current Shift:       Last three shifts:  09/27 1901 - 09/29 0700  In: 960 [P.O.:960]  Out: 2660 [Urine:2660]    Recent Results (from the past 24 hour(s))   GLUCOSE, POC    Collection Time: 09/28/18  5:05 PM   Result Value Ref Range    Glucose (POC) 166 (H) 70 - 110 mg/dL   GLUCOSE, POC    Collection Time: 09/28/18 10:16 PM   Result Value Ref Range    Glucose (POC) 159 (H) 70 - 110 mg/dL   CBC WITH AUTOMATED DIFF    Collection Time: 09/29/18  5:09 AM   Result Value Ref Range    WBC 10.7 4.6 - 13.2 K/uL    RBC 3.18 (L) 4.20 - 5.30 M/uL    HGB 8.9 (L) 12.0 - 16.0 g/dL    HCT 26.6 (L) 35.0 - 45.0 %    MCV 83.6 74.0 - 97.0 FL    MCH 28.0 24.0 - 34.0 PG    MCHC 33.5 31.0 - 37.0 g/dL    RDW 13.9 11.6 - 14.5 %    PLATELET 612 989 - 988 K/uL    MPV 10.6 9.2 - 11.8 FL    NEUTROPHILS 69 40 - 73 %    LYMPHOCYTES 18 (L) 21 - 52 %    MONOCYTES 12 (H) 3 - 10 %    EOSINOPHILS 1 0 - 5 %    BASOPHILS 0 0 - 2 %    ABS. NEUTROPHILS 7.4 1.8 - 8.0 K/UL    ABS. LYMPHOCYTES 1.9 0.9 - 3.6 K/UL    ABS. MONOCYTES 1.3 (H) 0.05 - 1.2 K/UL    ABS. EOSINOPHILS 0.1 0.0 - 0.4 K/UL    ABS.  BASOPHILS 0.0 0.0 - 0.1 K/UL    DF AUTOMATED     GLUCOSE, POC    Collection Time: 09/29/18  8:20 AM   Result Value Ref Range    Glucose (POC) 114 (H) 70 - 110 mg/dL   GLUCOSE, POC    Collection Time: 09/29/18  1:15 PM   Result Value Ref Range    Glucose (POC) 211 (H) 70 - 110 mg/dL     Lab Results   Component Value Date/Time    Glucose 75 08/30/2018 04:49 PM        ASSESSMENT   Stable for discharge  Post op pain controlled  Post op anemia H/H stable  T2DM  HTN    PLAN  Resume Lotrel 10-20 mg and other PTA meds  FeSo4 325 mg po twice daily x 1 month  H/H 3-4 weeks  Discussed with RUPERT Mahoney MD  9/29/2018, 7:09 PM

## 2018-09-29 NOTE — PROGRESS NOTES
0035 - received pt in room. Pt resting in bed. Assisted pt up to bathroom. Pt rates pain 9/10 to back. 1778 - assessment completed. Pt is AOx4. VSS. Patient resting more comfortably after pain medication, rates pain 3/10. Call bell placed at bedside. No distress noted. 1245 - assisted pt up to bathroom. 1430 - pt resting in bed. No distress noted. Waiting on her  for discharge. 1626 - reviewed discharge instructions/medication with pt and her . Left room for questions/concerns. Pt verbalized her understanding.

## 2018-09-29 NOTE — PROGRESS NOTES
Problem: Mobility Impaired (Adult and Pediatric)  Goal: *Acute Goals and Plan of Care (Insert Text)  Physical Therapy Goals   Initiated 9/26/2018 and to be accomplished within 7 days. 1.  Patient will complete all bed mobility with modified independence in order to prepare for EOB/OOB activity. 2.  Patient will perform sit <> stand with modified independence in order to prepare for OOB/gait activity. 3.  Patient will perform bed to chair transfers with modified independence in order to promote mobility and encourage seated activity to progress towards their prior level of function. 4.  Patient will ambulate 300 feet with modified independence using LRAD in order to prepare for safe negotiation of their environment. 5.  Patient will ascend/descend 4 stairs with HR with modified independence for safe exit/entry into her home     Outcome: Progressing Towards Goal  physical Therapy TREATMENT    Patient: Kelly Zavala (23 y.o. female)  Date: 9/29/2018  Diagnosis: spondylolisthesis  m43.16  Lumbar stenosis <principal problem not specified>  Procedure(s) (LRB):  decompression and fusion, flospine l3-5 (N/A) 3 Days Post-Op  Precautions: Fall, Spinal   Chart, physical therapy assessment, plan of care and goals were reviewed. PLOF:ambualtory with a cane inside, RW outside  ASSESSMENT:  Increased time needed to sit up EOB. Donned LSO at EOB with Lauren for set up. Pt able to perform sit to stand with bed in lowest position without difficulty. Ambulated 120ft with RW, reciprocal gt pattern, steady slow pace, no LOB or path deviations. Negotiated 4 steps holding (L) hand rail with (B) hands. Returned to room. Log roll performed to return to supine, Lauren with LEs. Donned new mepilex to R buttocks.     Education: log roll tech, stair nego tech  Progression toward goals:  [x]      Improving appropriately and progressing toward goals  []      Improving slowly and progressing toward goals  []      Not making progress toward goals and plan of care will be adjusted     PLAN:  Patient continues to benefit from skilled intervention to address the above impairments. Continue treatment per established plan of care. Discharge Recommendations:  Home Health  Further Equipment Recommendations for Discharge:  Has a RW     SUBJECTIVE:   Patient stated Anisha Carrasco had some medicine so my pain is better.     OBJECTIVE DATA SUMMARY:   Critical Behavior:  Neurologic State: Alert  Orientation Level: Oriented X4  Cognition: Appropriate decision making, Appropriate for age attention/concentration, Appropriate safety awareness, Follows commands  Safety/Judgement: Fall prevention  Functional Mobility Training:  Bed Mobility:  Rolling: Supervision  Supine to Sit: Contact guard assistance; Additional time  Sit to Supine: Contact guard assistance;Minimum assistance; Additional time  Transfers:  Sit to Stand: Stand-by assistance  Stand to Sit: Supervision  Balance:  Sitting: Intact  Standing: Intact; With support  Standing - Static: Good  Standing - Dynamic : Fair  Ambulation/Gait Training:  Distance (ft): 120 Feet (ft)  Assistive Device: Brace/Splint;Gait belt;Walker, rolling  Ambulation - Level of Assistance: Stand-by assistance  Speed/Aissatou: Slow  Stairs:  Number of Stairs Trained: 4  Stairs - Level of Assistance: Stand-by assistance  Rail Use: Left   GCODE:Mobility [id number] Current  CI= 1-19%   Goal  CI= 1-19%. The severity rating is based on the Level of Assistance required for Functional Mobility and ADLs. Pain:  Pre:6  Post:6  Pain Scale 1: Numeric (0 - 10)    Pain Location 1: Back  Pain Orientation 1: Posterior  Pain Description 1: Aching    Activity Tolerance:   Good  Please refer to the flowsheet for vital signs taken during this treatment.   After treatment:   [] Patient left in no apparent distress sitting up in chair  [x] Patient left in no apparent distress in bed  [x] Call bell left within reach  [] Nursing notified  [] Caregiver present  [] Bed alarm activated      Eugene James Lea PTA   Time Calculation: 28 mins

## 2018-09-29 NOTE — PROGRESS NOTES
Problem: Falls - Risk of  Goal: *Absence of Falls  Document Richy Fall Risk and appropriate interventions in the flowsheet.    Outcome: Progressing Towards Goal  Fall Risk Interventions:  Mobility Interventions: Communicate number of staff needed for ambulation/transfer, Patient to call before getting OOB, PT Consult for mobility concerns, PT Consult for assist device competence    Mentation Interventions: Door open when patient unattended    Medication Interventions: Evaluate medications/consider consulting pharmacy, Patient to call before getting OOB, Teach patient to arise slowly    Elimination Interventions: Call light in reach, Patient to call for help with toileting needs, Toileting schedule/hourly rounds    History of Falls Interventions: Door open when patient unattended

## 2018-10-01 ENCOUNTER — HOME CARE VISIT (OUTPATIENT)
Dept: SCHEDULING | Facility: HOME HEALTH | Age: 73
End: 2018-10-01
Payer: MEDICARE

## 2018-10-01 VITALS
HEART RATE: 82 BPM | DIASTOLIC BLOOD PRESSURE: 70 MMHG | OXYGEN SATURATION: 94 % | TEMPERATURE: 99.1 F | SYSTOLIC BLOOD PRESSURE: 156 MMHG

## 2018-10-01 PROCEDURE — 400013 HH SOC

## 2018-10-01 PROCEDURE — G0151 HHCP-SERV OF PT,EA 15 MIN: HCPCS

## 2018-10-01 PROCEDURE — 3331090002 HH PPS REVENUE DEBIT

## 2018-10-01 PROCEDURE — 3331090001 HH PPS REVENUE CREDIT

## 2018-10-02 PROCEDURE — 3331090002 HH PPS REVENUE DEBIT

## 2018-10-02 PROCEDURE — 3331090001 HH PPS REVENUE CREDIT

## 2018-10-03 ENCOUNTER — HOME CARE VISIT (OUTPATIENT)
Dept: SCHEDULING | Facility: HOME HEALTH | Age: 73
End: 2018-10-03
Payer: MEDICARE

## 2018-10-03 VITALS
OXYGEN SATURATION: 96 % | TEMPERATURE: 99.1 F | DIASTOLIC BLOOD PRESSURE: 62 MMHG | SYSTOLIC BLOOD PRESSURE: 136 MMHG | HEART RATE: 62 BPM

## 2018-10-03 PROCEDURE — 3331090001 HH PPS REVENUE CREDIT

## 2018-10-03 PROCEDURE — G0157 HHC PT ASSISTANT EA 15: HCPCS

## 2018-10-03 PROCEDURE — 3331090002 HH PPS REVENUE DEBIT

## 2018-10-04 ENCOUNTER — HOME CARE VISIT (OUTPATIENT)
Dept: HOME HEALTH SERVICES | Facility: HOME HEALTH | Age: 73
End: 2018-10-04
Payer: MEDICARE

## 2018-10-04 VITALS
TEMPERATURE: 98.3 F | SYSTOLIC BLOOD PRESSURE: 110 MMHG | OXYGEN SATURATION: 97 % | DIASTOLIC BLOOD PRESSURE: 62 MMHG | HEART RATE: 88 BPM

## 2018-10-04 PROCEDURE — 3331090001 HH PPS REVENUE CREDIT

## 2018-10-04 PROCEDURE — G0157 HHC PT ASSISTANT EA 15: HCPCS

## 2018-10-04 PROCEDURE — 3331090002 HH PPS REVENUE DEBIT

## 2018-10-05 PROCEDURE — 3331090002 HH PPS REVENUE DEBIT

## 2018-10-05 PROCEDURE — 3331090001 HH PPS REVENUE CREDIT

## 2018-10-06 PROCEDURE — 3331090002 HH PPS REVENUE DEBIT

## 2018-10-06 PROCEDURE — 3331090001 HH PPS REVENUE CREDIT

## 2018-10-07 PROCEDURE — 3331090001 HH PPS REVENUE CREDIT

## 2018-10-07 PROCEDURE — 3331090002 HH PPS REVENUE DEBIT

## 2018-10-08 ENCOUNTER — HOME CARE VISIT (OUTPATIENT)
Dept: SCHEDULING | Facility: HOME HEALTH | Age: 73
End: 2018-10-08
Payer: MEDICARE

## 2018-10-08 VITALS
OXYGEN SATURATION: 97 % | TEMPERATURE: 99 F | SYSTOLIC BLOOD PRESSURE: 118 MMHG | HEART RATE: 94 BPM | DIASTOLIC BLOOD PRESSURE: 75 MMHG

## 2018-10-08 PROCEDURE — 3331090001 HH PPS REVENUE CREDIT

## 2018-10-08 PROCEDURE — 3331090002 HH PPS REVENUE DEBIT

## 2018-10-08 PROCEDURE — G0157 HHC PT ASSISTANT EA 15: HCPCS

## 2018-10-09 PROCEDURE — 3331090002 HH PPS REVENUE DEBIT

## 2018-10-09 PROCEDURE — 3331090001 HH PPS REVENUE CREDIT

## 2018-10-10 ENCOUNTER — HOME CARE VISIT (OUTPATIENT)
Dept: SCHEDULING | Facility: HOME HEALTH | Age: 73
End: 2018-10-10
Payer: MEDICARE

## 2018-10-10 VITALS
DIASTOLIC BLOOD PRESSURE: 69 MMHG | OXYGEN SATURATION: 97 % | TEMPERATURE: 98.5 F | SYSTOLIC BLOOD PRESSURE: 127 MMHG | HEART RATE: 83 BPM

## 2018-10-10 PROCEDURE — 3331090001 HH PPS REVENUE CREDIT

## 2018-10-10 PROCEDURE — G0157 HHC PT ASSISTANT EA 15: HCPCS

## 2018-10-10 PROCEDURE — 3331090002 HH PPS REVENUE DEBIT

## 2018-10-11 ENCOUNTER — HOME CARE VISIT (OUTPATIENT)
Dept: SCHEDULING | Facility: HOME HEALTH | Age: 73
End: 2018-10-11
Payer: MEDICARE

## 2018-10-11 VITALS
TEMPERATURE: 99.1 F | HEART RATE: 95 BPM | DIASTOLIC BLOOD PRESSURE: 60 MMHG | OXYGEN SATURATION: 95 % | SYSTOLIC BLOOD PRESSURE: 105 MMHG

## 2018-10-11 PROCEDURE — 3331090001 HH PPS REVENUE CREDIT

## 2018-10-11 PROCEDURE — G0157 HHC PT ASSISTANT EA 15: HCPCS

## 2018-10-11 PROCEDURE — 3331090002 HH PPS REVENUE DEBIT

## 2018-10-12 PROCEDURE — 3331090001 HH PPS REVENUE CREDIT

## 2018-10-12 PROCEDURE — 3331090002 HH PPS REVENUE DEBIT

## 2018-10-13 PROCEDURE — 3331090001 HH PPS REVENUE CREDIT

## 2018-10-13 PROCEDURE — 3331090002 HH PPS REVENUE DEBIT

## 2018-10-14 PROCEDURE — 3331090002 HH PPS REVENUE DEBIT

## 2018-10-14 PROCEDURE — 3331090001 HH PPS REVENUE CREDIT

## 2018-10-15 ENCOUNTER — HOME CARE VISIT (OUTPATIENT)
Dept: HOME HEALTH SERVICES | Facility: HOME HEALTH | Age: 73
End: 2018-10-15
Payer: MEDICARE

## 2018-10-15 PROCEDURE — 3331090001 HH PPS REVENUE CREDIT

## 2018-10-15 PROCEDURE — 3331090002 HH PPS REVENUE DEBIT

## 2018-10-15 PROCEDURE — G0157 HHC PT ASSISTANT EA 15: HCPCS

## 2018-10-16 ENCOUNTER — HOME CARE VISIT (OUTPATIENT)
Dept: HOME HEALTH SERVICES | Facility: HOME HEALTH | Age: 73
End: 2018-10-16
Payer: MEDICARE

## 2018-10-16 PROCEDURE — 3331090001 HH PPS REVENUE CREDIT

## 2018-10-16 PROCEDURE — 3331090002 HH PPS REVENUE DEBIT

## 2018-10-17 ENCOUNTER — HOME CARE VISIT (OUTPATIENT)
Dept: SCHEDULING | Facility: HOME HEALTH | Age: 73
End: 2018-10-17
Payer: MEDICARE

## 2018-10-17 VITALS
DIASTOLIC BLOOD PRESSURE: 64 MMHG | OXYGEN SATURATION: 97 % | SYSTOLIC BLOOD PRESSURE: 122 MMHG | HEART RATE: 75 BPM | TEMPERATURE: 98.3 F

## 2018-10-17 PROCEDURE — 3331090001 HH PPS REVENUE CREDIT

## 2018-10-17 PROCEDURE — G0157 HHC PT ASSISTANT EA 15: HCPCS

## 2018-10-17 PROCEDURE — 3331090002 HH PPS REVENUE DEBIT

## 2018-10-18 ENCOUNTER — HOME CARE VISIT (OUTPATIENT)
Dept: SCHEDULING | Facility: HOME HEALTH | Age: 73
End: 2018-10-18
Payer: MEDICARE

## 2018-10-18 PROCEDURE — 3331090001 HH PPS REVENUE CREDIT

## 2018-10-18 PROCEDURE — 3331090002 HH PPS REVENUE DEBIT

## 2018-10-18 PROCEDURE — G0157 HHC PT ASSISTANT EA 15: HCPCS

## 2018-10-19 VITALS
OXYGEN SATURATION: 98 % | DIASTOLIC BLOOD PRESSURE: 67 MMHG | HEART RATE: 82 BPM | TEMPERATURE: 97.5 F | SYSTOLIC BLOOD PRESSURE: 130 MMHG

## 2018-10-19 PROCEDURE — 3331090001 HH PPS REVENUE CREDIT

## 2018-10-19 PROCEDURE — 3331090002 HH PPS REVENUE DEBIT

## 2018-10-20 PROCEDURE — 3331090002 HH PPS REVENUE DEBIT

## 2018-10-20 PROCEDURE — 3331090001 HH PPS REVENUE CREDIT

## 2018-10-21 PROCEDURE — 3331090001 HH PPS REVENUE CREDIT

## 2018-10-21 PROCEDURE — 3331090002 HH PPS REVENUE DEBIT

## 2018-10-22 PROCEDURE — 3331090001 HH PPS REVENUE CREDIT

## 2018-10-22 PROCEDURE — 3331090002 HH PPS REVENUE DEBIT

## 2018-10-23 PROCEDURE — 3331090001 HH PPS REVENUE CREDIT

## 2018-10-23 PROCEDURE — 3331090002 HH PPS REVENUE DEBIT

## 2018-10-24 ENCOUNTER — HOME CARE VISIT (OUTPATIENT)
Dept: SCHEDULING | Facility: HOME HEALTH | Age: 73
End: 2018-10-24
Payer: MEDICARE

## 2018-10-24 VITALS
HEART RATE: 71 BPM | OXYGEN SATURATION: 98 % | SYSTOLIC BLOOD PRESSURE: 128 MMHG | DIASTOLIC BLOOD PRESSURE: 70 MMHG | TEMPERATURE: 97.6 F

## 2018-10-24 PROCEDURE — 3331090001 HH PPS REVENUE CREDIT

## 2018-10-24 PROCEDURE — 3331090002 HH PPS REVENUE DEBIT

## 2018-10-24 PROCEDURE — G0151 HHCP-SERV OF PT,EA 15 MIN: HCPCS

## 2018-11-11 NOTE — DISCHARGE SUMMARY
Discharge Summary    Admit Date: 2018  Discharge Date:  18    Patient ID:   Name:  Noemí Cortez      Age:  74 y.o.    :  1945    Admitting Diagnosis: spondylolisthesis  m43.16  Lumbar stenosis     Post Operative Day: 3    Operative Procedures:  MA ARTHRODESIS POSTERIOR/POSTEROLATERAL LUMBAR [65044] (SPINE LUMBAR POSTERIOR INTERBODY FUSION (PLIF))  SPINE FUSN,POST TECH,EA ADDNL SGMT [43263]  LAMINEC/FACETECT/FORAMIN,LUMBAR [53238]  LAMINEC/FACETECT/FORAMIN,EACH ADDNL [17510]  SPINE FUSN,POST TECH,EA ADDNL SGMT [80762]  INSERT VERT FIX DEV,POST,3-6 SGMTS [77084]  SPIN BONE AUTOGRFT LOCAL [75963]      Isolation Precautions:   Not required. Patient is not currently contagious. Physical Exam on Discharge:  Visit Vitals  /68 (BP 1 Location: Right arm, BP Patient Position: Sitting)   Pulse 94   Temp 97.5 °F (36.4 °C)   Resp 16   Ht 5' 5\" (1.651 m)   Wt 102.1 kg (225 lb)   SpO2 92%   BMI 37.44 kg/m²         General: in no apparent distress   Extremities:  Neurovascular intact    Dressing:  Dry   DVT Exam:   No evidence of DVT seen on physical exam;  compartments soft and NT.          Relevant labs within last 72 hours:    CBC w/Diff    Lab Results   Component Value Date/Time    WBC 10.7 2018 05:09 AM    RBC 3.18 (L) 2018 05:09 AM    HCT 26.6 (L) 2018 05:09 AM    MCV 83.6 2018 05:09 AM    MCH 28.0 2018 05:09 AM    MCHC 33.5 2018 05:09 AM    RDW 13.9 2018 05:09 AM    Lab Results   Component Value Date/Time    MONOS 12 (H) 2018 05:09 AM    EOS 1 2018 05:09 AM    BASOS 0 2018 05:09 AM    RDW 13.9 2018 05:09 AM          BMP   Lab Results   Component Value Date     2018    CO2 29 2018    BUN 14 2018          Coagulation   No results found for: INR, APTT           Condition at discharge: Afebrile  Ambulating  Eating, Drinking, Voiding  Stable    Cannot display discharge medications since this patient is not currently admitted. PCP:  Valery Hamm MD        Disposition:  Clear for discharge to home, if clear by medicine service. Follow-up in the office in  1 month with Dr. Melony Brooke; call [phone] to schedule appointment.      Wound Care: dressing down POD 5         -Helen Horner PA-C  11/11/2018  Office [phone]  Cell [phone]

## 2018-11-27 ENCOUNTER — HOSPITAL ENCOUNTER (OUTPATIENT)
Dept: PHYSICAL THERAPY | Age: 73
Discharge: HOME | End: 2018-11-27
Payer: MEDICARE

## 2018-11-27 PROCEDURE — 97110 THERAPEUTIC EXERCISES: CPT

## 2018-11-27 PROCEDURE — 97535 SELF CARE MNGMENT TRAINING: CPT

## 2018-11-27 PROCEDURE — G8979 MOBILITY GOAL STATUS: HCPCS

## 2018-11-27 PROCEDURE — 97163 PT EVAL HIGH COMPLEX 45 MIN: CPT

## 2018-11-27 PROCEDURE — G8978 MOBILITY CURRENT STATUS: HCPCS

## 2018-11-27 NOTE — PROGRESS NOTES
[facility]  [address] [facility] [address]  [place], [address]  [phone] fax    Plan of Care/ Statement of Necessity for Physical Therapy Services    Patient name: Jessica Ambriz Start of Care: 2018   Referral source: Tanner Garcia MD : 1945    Medical Diagnosis: Low back pain [M54.5]  Payor: Cara Bosworth / Plan: [place] PART A & B / Product Type: Medicare /  Onset Date:18    Treatment Diagnosis: S/P L3-L5 fusion   Prior Hospitalization: see medical history Provider#: [id number]   Medications: Verified on Patient summary List    Comorbidities: HTN; DM   Prior Level of Function: Ambulated without AD; I with all activities; participated in exercise class for seniors 3x/week      The Plan of Care and following information is based on the information from the initial evaluation. Assessment/ key information: 74y.o. year old female presents with CC of B leg weakness and inability to walk long distances s/p L3_l5 fusion. Impairments noted today:  Poor glute and TA recruitment and strength; decrease B hip mobility/strength. Patient will benefit from physical therapy to address deficits, and ultimately to return patient to prior level of function. Evaluation Complexity History MEDIUM  Complexity : 1-2 comorbidities / personal factors will impact the outcome/ POC ; Examination HIGH Complexity : 4+ Standardized tests and measures addressing body structure, function, activity limitation and / or participation in recreation  ;Presentation MEDIUM Complexity : Evolving with changing characteristics  ; Clinical Decision Making MEDIUM Complexity : FOTO score of 26-74  Overall Complexity Rating: HIGH   Problem List: pain affecting function, decrease ROM, decrease strength, decrease ADL/ functional abilitiies, decrease activity tolerance and decrease flexibility/ joint mobility   Treatment Plan may include any combination of the following: Therapeutic exercise, Neuromuscular re-education, Physical agent/modality, Manual therapy and Patient education  Patient / Family readiness to learn indicated by: asking questions, trying to perform skills and interest  Persons(s) to be included in education: patient (P)  Barriers to Learning/Limitations: None  Patient Goal (s): strengthen my back and legs  Patient Self Reported Health Status: good  Rehabilitation Potential: good    Short Term Goals: To be accomplished in 3 weeks:   1. I and compliant with HEP for self management of symptoms. 2. Perform unilateral glute set without difficulty to indicate improved glute recruitment for standing activities. 3. Ambulate >500' around clinic without difficulty to indicate improved activity tolerance for ADLs. Long Term Goals: To be accomplished in 8 weeks:   1. Improve FOTO to 55 to indicate improved function with daily activities. 2. Increase B glute strength to grossly 4+/5 to enable patient to negotiate several flights of stairs without difficulty. 3. Perform core exercises without minimal rectus bulge to indicate improved TA to enable patient to stand for 1 hour. Frequency / Duration: Patient to be seen 2 times per week for 8 weeks. Patient/ Caregiver education and instruction: Diagnosis, prognosis, exercises   [x]  Plan of care has been reviewed with PTA    G-Codes (GP)  Mobility   Current  CK= 40-59%   Goal  CK= 40-59%    The severity rating is based on clinical judgment and the FOTO score. Certification Period: 11/27/18-1/26/19  Ronny Mcpherson, PT 11/27/2018 4:14 PM    ________________________________________________________________________    I certify that the above Therapy Services are being furnished while the patient is under my care. I agree with the treatment plan and certify that this therapy is necessary.     [de-identified] Signature:____________________  Date:____________Time: _________    Please sign and return to [facility] [address] [place] [address] & [facility]  [address] [facility] [address] [facility] [address]  [place], [address]  [phone] fax

## 2018-11-27 NOTE — PROGRESS NOTES
PT DAILY TREATMENT NOTE 10-18    Patient Name: Ene Harper  Date:2018  : 1945  [x]  Patient  Verified  Payor: [place] MEDICARE / Plan: [place] MEDICARE PART A & B / Product Type: Medicare /    In time:317  Out time:353  Total Treatment Time (min): 35  Visit #: 1 of 16    Medicare/BCBS Only   Total Timed Codes (min):  25 1:1 Treatment Time:  35       Treatment Area: Low back pain [M54.5]    SUBJECTIVE  Pain Level (0-10 scale): 0  Any medication changes, allergies to medications, adverse drug reactions, diagnosis change, or new procedure performed?: [x] No    [] Yes (see summary sheet for update)  Subjective functional status/changes:   [] No changes reported  See eval    OBJECTIVE        10 min [x]Eval                  []Re-Eval        15 min Therapeutic Exercise:  [] See flow sheet :   Rationale: increase ROM and increase strength to improve the patients ability to perform daily activities  Self-care: 10'        With   [] TE   [] TA   [] neuro   [] other: Patient Education: [x] Review HEP    [] Progressed/Changed HEP based on:   [] positioning   [] body mechanics   [] transfers   [] heat/ice application    [] other:      Other Objective/Functional Measures:      Pain Level (0-10 scale) post treatment: 0    ASSESSMENT/Changes in Function: see POC    Patient will continue to benefit from skilled PT services to modify and progress therapeutic interventions, address functional mobility deficits, address ROM deficits, address strength deficits, analyze and address soft tissue restrictions and analyze and cue movement patterns to attain remaining goals. [x]  See Plan of Care  []  See progress note/recertification  []  See Discharge Summary         Progress towards goals / Updated goals:  Short Term Goals: To be accomplished in 3 weeks:              1. I and compliant with HEP for self management of symptoms.    2. Perform unilateral glute set without difficulty to indicate improved glute recruitment for standing activities. 3. Ambulate >500' around clinic without difficulty to indicate improved activity tolerance for ADLs. Long Term Goals: To be accomplished in 8 weeks:              1. Improve FOTO to 55 to indicate improved function with daily activities. 2. Increase B glute strength to grossly 4+/5 to enable patient to negotiate several flights of stairs without difficulty. 3. Perform core exercises without minimal rectus bulge to indicate improved TA to enable patient to stand for 1 hour.          PLAN  []  Upgrade activities as tolerated     [x]  Continue plan of care  []  Update interventions per flow sheet       []  Discharge due to:_  []  Other:_      Allie Garcia, PT 11/27/2018  4:21 PM    Future Appointments   Date Time Provider Pastora Mcmahan   11/29/2018  2:00 PM Inessa Peres [facility]   12/4/2018  2:00 PM [facility]   12/6/2018  2:00 PM Inessa Peres [facility]   12/11/2018  2:00 PM Inessa Peres [facility]   12/13/2018  2:00 PM Michael, [address] [facility]   12/18/2018  2:00 PM [facility]   12/20/2018  2:00 PM [place], [address] [facility]   1/2/2019  2:00 PM Rainell [facility]

## 2018-11-29 ENCOUNTER — HOSPITAL ENCOUNTER (OUTPATIENT)
Dept: PHYSICAL THERAPY | Age: 73
Discharge: HOME | End: 2018-11-29
Payer: MEDICARE

## 2018-11-29 PROCEDURE — 97110 THERAPEUTIC EXERCISES: CPT

## 2018-11-29 NOTE — PROGRESS NOTES
PT DAILY TREATMENT NOTE 10-18    Patient Name: Bety Bartholomew  Date:2018  : 1945  [x]  Patient  Verified  Payor: [place] MEDICARE / Plan: [place] MEDICARE PART A & B / Product Type: Medicare /    In time:2:00  Out time:2:38  Total Treatment Time (min): 38  Visit #: 2 of 16    Medicare/BCBS Only   Total Timed Codes (min):  38 1:1 Treatment Time:  38       Treatment Area: Low back pain [M54.5]    SUBJECTIVE  Pain Level (0-10 scale): 0  Any medication changes, allergies to medications, adverse drug reactions, diagnosis change, or new procedure performed?: [x] No    [] Yes (see summary sheet for update)  Subjective functional status/changes:   [] No changes reported  She reports HEP compliance. \"no pain, just stiff and weak. \"    OBJECTIVE    38 min Therapeutic Exercise:  [x] See flow sheet :   Rationale: increase ROM, increase strength and improve coordination to improve the patients ability to increase ease with ADLs         With   [] TE   [] TA   [] neuro   [] other: Patient Education: [x] Review HEP    [] Progressed/Changed HEP based on:   [] positioning   [] body mechanics   [] transfers   [] heat/ice application    [] other:      Other Objective/Functional Measures:   First follow up session---cues sequencing and correct form throughout     Rectus bulge noted with supine core activities. Decreased standing endurance    Pain Level (0-10 scale) post treatment: 0    ASSESSMENT/Changes in Function:   Initiated POC per flowsheet. Patient puts forth good effort with exercises and reports HEP compliance. Informed patient to wear sneakers (no heels/wedges) next visit. Decreased hip abd strength.      Patient will continue to benefit from skilled PT services to modify and progress therapeutic interventions, address functional mobility deficits, address ROM deficits, address strength deficits, analyze and address soft tissue restrictions, analyze and cue movement patterns, analyze and modify body mechanics/ergonomics and assess and modify postural abnormalities to attain remaining goals. []  See Plan of Care  []  See progress note/recertification  []  See Discharge Summary         Progress towards goals / Updated goals:  Short Term Goals: To be accomplished in 3 weeks:              2. I and compliant with HEP for self management of symptoms. met per patient report (11/29/2018)  2. Perform unilateral glute set without difficulty to indicate improved glute recruitment for standing activities. 3. Ambulate >500' around clinic without difficulty to indicate improved activity tolerance for ADLs. Long Term Goals: To be accomplished in 8 weeks:              1. Improve FOTO to 55 to indicate improved function with daily activities. 2. Increase B glute strength to grossly 4+/5 to enable patient to negotiate several flights of stairs without difficulty.    3. Perform core exercises without minimal rectus bulge to indicate improved TA to enable patient to stand for 1 hour.         PLAN  []  Upgrade activities as tolerated     [x]  Continue plan of care  []  Update interventions per flow sheet       []  Discharge due to:_  []  Other:_      Isela Quick 11/29/2018  1:59 PM    Future Appointments   Date Time Provider Pastora Mcmahan   11/29/2018  2[facility]   12/4/2018  2:00 PM Rainell [facility]   12/6/2018  2:00 PM Inessa Peres [facility]   12/11/2018  2:00 PM Inessa Peres [facility]   12/13/2018  2:00 PM [place], [address] [facility]   12/18/2018  2:00 PM Rainell [facility]   12/20/2018  2:00 PM Michael, [address] [facility]   1/2/2019  2:00 PM Rainell [facility]

## 2018-12-04 ENCOUNTER — HOSPITAL ENCOUNTER (OUTPATIENT)
Dept: PHYSICAL THERAPY | Age: 73
Discharge: HOME | End: 2018-12-04
Payer: MEDICARE

## 2018-12-04 PROCEDURE — 97116 GAIT TRAINING THERAPY: CPT

## 2018-12-04 PROCEDURE — 97110 THERAPEUTIC EXERCISES: CPT

## 2018-12-04 NOTE — PROGRESS NOTES
PT DAILY TREATMENT NOTE 10-18 Patient Name: Kristi Uribe Date:2018 : 1945 [x]  Patient  Verified Payor: [place] MEDICARE / Plan: Kaylee Soliman / Product Type: Medicare / In time:2:00  Out time:2:39 Total Treatment Time (min): 39 Visit #: 3 of 16 Medicare/BCBS Only Total Timed Codes (min):  39 1:1 Treatment Time:  44 Treatment Area: Low back pain [M54.5] SUBJECTIVE Pain Level (0-10 scale): 0 Any medication changes, allergies to medications, adverse drug reactions, diagnosis change, or new procedure performed?: [x] No    [] Yes (see summary sheet for update) Subjective functional status/changes:   [] No changes reported \"I was a little tired after last time, but I felt good. \" OBJECTIVE 31 min Therapeutic Exercise:  [x] See flow sheet :  
Rationale: increase ROM, increase strength and improve coordination to improve the patients ability to increase ease with ADLs 8 min Gait Training: dynamic gait to include: forward ambulation with SPC, high marches with SPC, lateral side stepping void of AD. Rationale: With 
 [] TE 
 [] TA 
 [] neuro 
 [] other: Patient Education: [x] Review HEP [] Progressed/Changed HEP based on:  
[] positioning   [] body mechanics   [] transfers   [] heat/ice application   
[] other:   
 
Other Objective/Functional Measures: 
Cues to avoid valsalva maneuver Pain Level (0-10 scale) post treatment: 0 
 
ASSESSMENT/Changes in Function:  
Continues with rectus bulge though improving in pelvic awareness. Presents with Trendelenburg gait pattern while ambulating around clinic.   
 
Patient will continue to benefit from skilled PT services to modify and progress therapeutic interventions, address functional mobility deficits, address ROM deficits, address strength deficits, analyze and address soft tissue restrictions, analyze and cue movement patterns, analyze and modify body mechanics/ergonomics and assess and modify postural abnormalities to attain remaining goals. []  See Plan of Care 
[]  See progress note/recertification 
[]  See Discharge Summary Progress towards goals / Updated goals: 
Short Term Goals: To be accomplished in 3 weeks: 
            7. I and compliant with HEP for self management of symptoms. met per patient report (11/29/2018) 2. Perform unilateral glute set without difficulty to indicate improved glute recruitment for standing activities. -progressing, able to perform on left, struggles on right (12/4/2018) 3. Ambulate >500' around clinic without difficulty to indicate improved activity tolerance for ADLs. Long Term Goals: To be accomplished in 8 weeks: 
            1. Improve FOTO to 55 to indicate improved function with daily activities. 2. Increase B glute strength to grossly 4+/5 to enable patient to negotiate several flights of stairs without difficulty. 3. Perform core exercises without minimal rectus bulge to indicate improved TA to enable patient to stand for 1 hour.  PLAN 
[]  Upgrade activities as tolerated     [x]  Continue plan of care 
[]  Update interventions per flow sheet      
[]  Discharge due to:_ 
[]  Other:_   
 
Nicolas Rota 12/4/2018  2:02 PM 
 
Future Appointments Date Time Provider Pastora Mcmahan 12/7/2018  2:00 PM Renny Res [facility]  
12/11/2018  2:00 PM Renny Res [facility]  
12/14/2018  2:00 PM Renny [facility]  
12/18/2018  2:00 PM Renny Res [facility]  
12/21/2018  2:30 PM Renny Res [facility]  
1/2/2019  2:00 PM Renny Res [facility]

## 2018-12-06 ENCOUNTER — APPOINTMENT (OUTPATIENT)
Dept: PHYSICAL THERAPY | Age: 73
End: 2018-12-06
Payer: MEDICARE

## 2018-12-07 ENCOUNTER — HOSPITAL ENCOUNTER (OUTPATIENT)
Dept: PHYSICAL THERAPY | Age: 73
Discharge: HOME | End: 2018-12-07
Payer: MEDICARE

## 2018-12-07 PROCEDURE — 97116 GAIT TRAINING THERAPY: CPT

## 2018-12-07 PROCEDURE — 97110 THERAPEUTIC EXERCISES: CPT

## 2018-12-07 NOTE — PROGRESS NOTES
PT DAILY TREATMENT NOTE 10-18 Patient Name: Charly Guillaume Date:2018 : 1945 [x]  Patient  Verified Payor: [place] MEDICARE / Plan: Kaylee Soliman / Product Type: Medicare / In time:2:00  Out time:2:43 Total Treatment Time (min): 43 Visit #: 4 of 16 Medicare/BCBS Only Total Timed Codes (min):  43 1:1 Treatment Time:  37 Treatment Area: Low back pain [M54.5] SUBJECTIVE Pain Level (0-10 scale): 0 Any medication changes, allergies to medications, adverse drug reactions, diagnosis change, or new procedure performed?: [x] No    [] Yes (see summary sheet for update) Subjective functional status/changes:   [] No changes reported \"I really have to think about not limping when I walk. \" OBJECTIVE 35 min Therapeutic Exercise:  [x] See flow sheet :  
Rationale: increase ROM, increase strength and improve coordination to improve the patients ability to increase ease with ADLs 8 min Gait Training: dynamic gait to include: retro and lateral ambulation, high marches, and gait void of AD Rationale: With 
 [] TE 
 [] TA 
 [] neuro 
 [] other: Patient Education: [x] Review HEP [] Progressed/Changed HEP based on:  
[] positioning   [] body mechanics   [] transfers   [] heat/ice application   
[] other:   
 
Other Objective/Functional Measures:  
Decreased rectus bulge noted today Pain Level (0-10 scale) post treatment: 0 
 
ASSESSMENT/Changes in Function:  
Improved core awareness and ease with PPT tilts today. Continues with decreased hip abd strength, Left>right. Presents with fatigue by end of session.   
 
Patient will continue to benefit from skilled PT services to modify and progress therapeutic interventions, address functional mobility deficits, address ROM deficits, address strength deficits, analyze and address soft tissue restrictions, analyze and cue movement patterns, analyze and modify body mechanics/ergonomics and assess and modify postural abnormalities to attain remaining goals. []  See Plan of Care 
[]  See progress note/recertification 
[]  See Discharge Summary Progress towards goals / Updated goals: 
Short Term Goals: To be accomplished in 3 weeks: 
            1. I and compliant with HEP for self management of symptoms. met per patient report (11/29/2018) 2. Perform unilateral glute set without difficulty to indicate improved glute recruitment for standing activities. -progressing, able to perform on left, struggles on right (12/4/2018) 3. Ambulate >500' around clinic without difficulty to indicate improved activity- 
 tolerance for ADLs. progressing, able to ambulate x200 feet void of AD with noted Tredelenburg gait as she fatigues (12/7/2018) Long Term Goals: To be accomplished in 8 weeks: 
            1. Improve FOTO to 55 to indicate improved function with daily activities. 2. Increase B glute strength to grossly 4+/5 to enable patient to negotiate several flights of stairs without difficulty. 3. Perform core exercises without minimal rectus bulge to indicate improved TA to enable patient to stand for 1 hour.  PLAN 
[]  Upgrade activities as tolerated     []  Continue plan of care 
[]  Update interventions per flow sheet      
[]  Discharge due to:_ 
[]  Other:_   
 
Hafsa Miller 12/7/2018  2:02 PM 
 
Future Appointments Date Time Provider Pastora Mcmahan 12/11/2018  2:00 PM Aldo Grumet [facility]  
12/14/2018  2:00 PM Aldo Grumet [facility]  
12/18/2018  2:00 PM Men[place] Grumet [facility]  
12/21/2018  2:30 PM Aldo Grumet [facility]  
1/2/2019  2:00 PM Menahga Grumet [facility]

## 2018-12-11 ENCOUNTER — HOSPITAL ENCOUNTER (OUTPATIENT)
Dept: PHYSICAL THERAPY | Age: 73
Discharge: HOME | End: 2018-12-11
Payer: MEDICARE

## 2018-12-11 PROCEDURE — 97110 THERAPEUTIC EXERCISES: CPT

## 2018-12-11 NOTE — PROGRESS NOTES
PT DAILY TREATMENT NOTE 10-18    Patient Name: Emily Juarez  Date:2018  : 1945  [x]  Patient  Verified  Payor: [place] MEDICARE / Plan: VA MEDICARE PART A & B / Product Type: Medicare /    In time:2:00  Out time:2:50  Total Treatment Time (min): 50  Visit #: 5 of 16    Medicare/BCBS Only   Total Timed Codes (min):  50 1:1 Treatment Time:  40       Treatment Area: Low back pain [M54.5]    SUBJECTIVE  Pain Level (0-10 scale): 0  Any medication changes, allergies to medications, adverse drug reactions, diagnosis change, or new procedure performed?: [x] No    [] Yes (see summary sheet for update)  Subjective functional status/changes:   [] No changes reported  \"I was sore after last time. I see the importance of doing my homework. \"    OBJECTIVE    50 min Therapeutic Exercise:  [x] See flow sheet :   Rationale: increase ROM, increase strength and improve coordination to improve the patients ability to increase ease with ADLs and prolonged standing            With   [] TE   [] TA   [] neuro   [] other: Patient Education: [x] Review HEP    [] Progressed/Changed HEP based on:   [] positioning   [] body mechanics   [] transfers   [] heat/ice application    [] other:      Other Objective/Functional Measures:   Improving lumbopelvic awareness     Pain Level (0-10 scale) post treatment: 0    ASSESSMENT/Changes in Function:   Patient reports muscle soreness over weekend; she denies pain upon arrival today though she does present with slightly slower intervention performance. Continues to struggle to reduce rectus bulge. Patient will continue to benefit from skilled PT services to modify and progress therapeutic interventions, address functional mobility deficits, address ROM deficits, address strength deficits, analyze and address soft tissue restrictions, analyze and cue movement patterns, analyze and modify body mechanics/ergonomics and assess and modify postural abnormalities to attain remaining goals. []  See Plan of Care   []  See progress note/recertification  []  See Discharge Summary         Progress towards goals / Updated goals:  Short Term Goals: To be accomplished in 3 weeks:              2. I and compliant with HEP for self management of symptoms. met per patient report (11/29/2018)  2. Perform unilateral glute set without difficulty to indicate improved glute recruitment for standing activities. -progressing, able to perform on left, struggles on right (12/4/2018)  3. Ambulate >500' around clinic without difficulty to indicate improved activity-   tolerance for ADLs. progressing, able to ambulate x200 feet void of AD with noted Tredelenburg gait as she fatigues (12/7/2018)  [address] be accomplished in 8 weeks:              1. Improve FOTO to 55 to indicate improved function with daily activities. 2. Increase B glute strength to grossly 4+/5 to enable patient to negotiate several flights of stairs without difficulty.    3. Perform core exercises without minimal rectus bulge to indicate improved TA to enable patient to stand for 1 hour.         PLAN  []  Upgrade activities as tolerated     [x]  Continue plan of care  []  Update interventions per flow sheet       []  Discharge due to:_  []  Other:_      Becky Valdes 12/11/2018  1:57 PM    Future Appointments   Date Time Provider Pastora Mcmahan   12/11/2018  2:00 PM Joseph [facility]   12/14/2018  2:00 PM Vishal Noyola [facility]   12/18/2018  2:00 PM Vishal Noyola [facility]   12/21/2018  2:30 PM Vishal Noyola [facility]   1/2/2019  2:00 PM Vishal Noyola [facility]

## 2018-12-13 ENCOUNTER — APPOINTMENT (OUTPATIENT)
Dept: PHYSICAL THERAPY | Age: 73
End: 2018-12-13
Payer: MEDICARE

## 2018-12-14 ENCOUNTER — HOSPITAL ENCOUNTER (OUTPATIENT)
Dept: PHYSICAL THERAPY | Age: 73
Discharge: HOME | End: 2018-12-14
Payer: MEDICARE

## 2018-12-14 PROCEDURE — 97110 THERAPEUTIC EXERCISES: CPT

## 2018-12-14 NOTE — PROGRESS NOTES
PT DAILY TREATMENT NOTE 3-16    Patient Name: Tony Fulling  Date:2018  : 1945  [x]  Patient  Verified  Payor: [place] MEDICARE / Plan: VA MEDICARE PART A & B / Product Type: Medicare /    In time:2:01  Out time:2:45  Total Treatment Time (min): 44  Visit #: 6 of 16    Treatment Area: Low back pain [M54.5]    SUBJECTIVE  Pain Level (0-10 scale): 0  Any medication changes, allergies to medications, adverse drug reactions, diagnosis change, or new procedure performed?: [x] No    [] Yes (see summary sheet for update)  Subjective functional status/changes:   [] No changes reported  \"I had a fall on Wednesday. \" Patient reports that she was sitting in rolling chair, bent over to pick something up, and fell off. She reports some glute tenderness and no other issues. OBJECTIVE  44 min Therapeutic Exercise:  [x] See flow sheet :   Rationale: increase ROM, increase strength and improve coordination to improve the patients ability to increase ease with ADLs         With   [] TE   [] TA   [] neuro   [] other: Patient Education: [x] Review HEP    [] Progressed/Changed HEP based on:   [] positioning   [] body mechanics   [] transfers   [] heat/ice application    [] other:      Other Objective/Functional Measures:   Improved hip abd recruitment post cues     Pain Level (0-10 scale) post treatment: 0    ASSESSMENT/Changes in Function:   Patient reports fall on Wednesday while at [facility]. Despite some glute tenderness and increased stiffness, she reports no other pain. Patient will continue to benefit from skilled PT services to modify and progress therapeutic interventions, address functional mobility deficits, address ROM deficits, address strength deficits, analyze and address soft tissue restrictions, analyze and cue movement patterns, analyze and modify body mechanics/ergonomics and assess and modify postural abnormalities to attain remaining goals.      []  See Plan of Care  []  See progress note/recertification  []  See Discharge Summary         Progress towards goals / Updated goals:  Short Term Goals: To be accomplished in 3 weeks:              5. I and compliant with HEP for self management of symptoms. met per patient report (11/29/2018)  2. Perform unilateral glute set without difficulty to indicate improved glute recruitment for standing activities. -progressing, able to perform on left, struggles on right (12/4/2018)  3. Ambulate >500' around clinic without difficulty to indicate improved activity-   tolerance for ADLs. progressing, able to ambulate x200 feet void of AD with noted Tredelenburg gait as she fatigues (12/7/2018)  [address] be accomplished in 8 weeks:              1. Improve FOTO to 55 to indicate improved function with daily activities. 2. Increase B glute strength to grossly 4+/5 to enable patient to negotiate several flights of stairs without difficulty.    3. Perform core exercises without minimal rectus bulge to indicate improved TA to enable patient to stand for 1 hour. -continues with rectus bulge (12/14/2018)        PLAN  []  Upgrade activities as tolerated     [x]  Continue plan of care  []  Update interventions per flow sheet       []  Discharge due to:_  []  Other:_      Marquise Singh 12/14/2018  2:05 PM    Future Appointments   Date Time Provider Pastora Mcmahan   12/18/2018  [facility]   12/21/2018  2:30 PM Orma Basset [facility]   1/2/2019  2:00 PM [facility]

## 2018-12-18 ENCOUNTER — HOSPITAL ENCOUNTER (OUTPATIENT)
Dept: PHYSICAL THERAPY | Age: 73
Discharge: HOME | End: 2018-12-18
Payer: MEDICARE

## 2018-12-18 PROCEDURE — 97116 GAIT TRAINING THERAPY: CPT

## 2018-12-18 PROCEDURE — 97110 THERAPEUTIC EXERCISES: CPT

## 2018-12-20 ENCOUNTER — APPOINTMENT (OUTPATIENT)
Dept: PHYSICAL THERAPY | Age: 73
End: 2018-12-20
Payer: MEDICARE

## 2018-12-21 ENCOUNTER — HOSPITAL ENCOUNTER (OUTPATIENT)
Dept: PHYSICAL THERAPY | Age: 73
Discharge: HOME | End: 2018-12-21
Payer: MEDICARE

## 2018-12-21 PROCEDURE — 97116 GAIT TRAINING THERAPY: CPT

## 2018-12-21 PROCEDURE — 97110 THERAPEUTIC EXERCISES: CPT

## 2018-12-21 PROCEDURE — G8979 MOBILITY GOAL STATUS: HCPCS

## 2018-12-21 PROCEDURE — G8978 MOBILITY CURRENT STATUS: HCPCS

## 2018-12-21 NOTE — PROGRESS NOTES
PT DAILY TREATMENT NOTE 10-18    Patient Name: Kristi Cure  Date:2018  : 1945  [x]  Patient  Verified  Payor: [place] MEDICARE / Plan: [place] MEDICARE PART A & B / Product Type: Medicare /    In time:2:33  Out time:3:16  Total Treatment Time (min): 43  Visit #: 8 of 16    Medicare/BCBS Only   Total Timed Codes (min):  43 1:1 Treatment Time:  40       Treatment Area: Low back pain [M54.5]    SUBJECTIVE  Pain Level (0-10 scale): 0  Any medication changes, allergies to medications, adverse drug reactions, diagnosis change, or new procedure performed?: [x] No    [] Yes (see summary sheet for update)  Subjective functional status/changes:   [] No changes reported  Patient reports no new complaints. OBJECTIVE      33 min Therapeutic Exercise:  [x] See flow sheet :   Rationale: increase ROM, increase strength and improve coordination to improve the patients ability to increase ease with ADLs    10 min Gait Trainin feet void of AD device on level surfaces with stand by assist. Dynamic gait to include: high marches/latreal side stepping//tandem walk/forward ambulation with horizontal head turn   Rationale: With   [] TE   [] TA   [] neuro   [] other: Patient Education: [x] Review HEP    [] Progressed/Changed HEP based on:   [] positioning   [] body mechanics   [] transfers   [] heat/ice application    [] other:      Other Objective/Functional Measures:   Provided her with updated HEP     Pain Level (0-10 scale) post treatment: 0    ASSESSMENT/Changes in Function:   She is making steady progress towards goals. Core and pelvic awareness is improving. We are able to progress dynamic gait training void of AD though she fatigues quickly. She continues with mid-mod glute weakness. Continue 2x4 weeks to further progress gait stability and endurance for ease with her [employer]oration[employer] activities.      Patient will continue to benefit from skilled PT services to modify and progress therapeutic interventions, address functional mobility deficits, address ROM deficits, address strength deficits, analyze and address soft tissue restrictions, analyze and cue movement patterns, analyze and modify body mechanics/ergonomics and assess and modify postural abnormalities to attain remaining goals. []  See Plan of Care  [x]  See progress note/recertification  []  See Discharge Summary         Progress towards goals / Updated goals:  Short Term Goals: To be accomplished in 3 weeks:              1. I and compliant with HEP for self management of symptoms. met per patient report (11/29/2018)  2. Perform unilateral glute set without difficulty to indicate improved glute recruitment for standing activities. -progressing, able to perform on left, struggles on right (12/4/2018)  3. Ambulate >500' around clinic without difficulty to indicate improved activity-tolerance for ADLs. progressing, able to ambulate x200 feet void of AD with noted Tredelenburg gait as she fatigues (12/7/2018)  [address] be accomplished in 8 weeks:              1. Improve FOTO to 55 to indicate improved function with daily activities. -not assessed  2. Increase B glute strength to grossly 4+/5 to enable patient to negotiate several flights of stairs without difficulty. -progressing, bilateral glut max: 3/5. Glut med, left: 3+/5. Right, 4-/5 (12/18/2018)  3.  Perform core exercises without minimal rectus bulge to indicate improved TA to enable patient to stand for 1 hour. -continues with rectus bulge (12/14/2018) improving, able to correct post cueing (12/18/2018)        PLAN  []  Upgrade activities as tolerated     [x]  Continue plan of care  []  Update interventions per flow sheet       []  Discharge due to:_  []  Other:_      Luis Cordon 12/21/2018  2:37 PM    Future Appointments   Date Time Provider Pastora Mcmahan   1/2/2019  2:00 PM [facility]

## 2019-01-02 ENCOUNTER — APPOINTMENT (OUTPATIENT)
Dept: PHYSICAL THERAPY | Age: 74
End: 2019-01-02
Payer: MEDICARE

## 2019-01-02 NOTE — PROGRESS NOTES
In [address]  [place] [address] [place], [address] 
[phone] fax Medicare Progress Report Patient name: Ubaldo Alvarado Start of Care: 2018 Referral source: Lizbeth Pichardo MD : 1945 Medical Diagnosis: Low back pain [M54.5] Payor: Amanon Joey / Plan: [place] MEDICARE PART A & B / Product Type: Medicare /  Onset Date:18 Treatment Diagnosis: S/P L3-L5 fusion Prior Hospitalization: see medical history Provider#: [id number] Medications: Verified on Patient summary List  
 Comorbidities: HTN; DM 
 Prior Level of Function: Ambulated without AD; I with all activities; participated in exercise class for seniors 3x/week Visits from Start of Care: 8    Missed Visits: 0 Reporting Period: 18 to 18 Subjective Reports:  
Progress towards goals: 
Short Term Goals: To be accomplished in 3 weeks: 
            1. I and compliant with HEP for self management of symptoms. met per patient report (2018) 2. Perform unilateral glute set without difficulty to indicate improved glute recruitment for standing activities. -progressing, able to perform on left, struggles on right (2018) 3. Ambulate >500' around clinic without difficulty to indicate improved activity-tolerance for ADLs. progressing, able to ambulate x200 feet void of AD with noted Tredelenburg gait as she fatigues (2018) Long Term Goals: To be accomplished in 8 weeks: 
            1. Improve FOTO to 55 to indicate improved function with daily activities. -not yet reassessed 2. Increase B glute strength to grossly 4+/5 to enable patient to negotiate several flights of stairs without difficulty. -progressing, bilateral glut max: 3/5. Glut med, left: 3+/5. Right, 4-/5 (2018) 3.  Perform core exercises without minimal rectus bulge to indicate improved TA to enable patient to stand for 1 hour. -continues with rectus bulge (12/14/2018) improving, able to correct post cueing (12/18/2018) 
  
Key functional changes: 
She is making steady progress towards goals. Core and pelvic awareness is improving. We are able to progress dynamic gait training void of AD though she fatigues quickly. She continues with mid-mod glute weakness. 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  
  
 
Problem List: pain affecting function, decrease ROM, decrease strength, impaired gait/ balance, decrease ADL/ functional abilitiies, decrease activity tolerance and decrease flexibility/ joint mobility Treatment Plan: Therapeutic exercise, Therapeutic activities, Physical agent/modality, Gait/balance training, Manual therapy, Patient education and Self Care training Patient Goal (s) has been updated and includes: \"To strengthen\" Updated Goals to be accomplished in 4 weeks: 
            1. Improve FOTO to 55 to indicate improved function with daily activities. -not yet reassessed 2. Increase B glute strength to grossly 4+/5 to enable patient to negotiate several flights of stairs without difficulty. -progressing, bilateral glut max: 3/5. Glut med, left: 3+/5. Right, 4-/5 (12/18/2018) 3. Perform core exercises without minimal rectus bulge to indicate improved TA to enable patient to stand for 1 hour. -continues with rectus bulge (12/14/2018) improving, able to correct post cueing (12/18/2018) Frequency / Duration: Patient to be seen 2 times per week for 4 weeks: 
 
G-Codes (GP) Mobility  Current  CK= 40-59%   Goal  CK= 40-59% The severity rating is based on clinical judgment and the FOTO score.  
 
Penny Alva, PT 1/2/2019 1:53 PM

## 2019-01-04 ENCOUNTER — HOSPITAL ENCOUNTER (OUTPATIENT)
Dept: PHYSICAL THERAPY | Age: 74
End: 2019-01-04
Payer: MEDICARE

## 2019-01-08 ENCOUNTER — HOSPITAL ENCOUNTER (OUTPATIENT)
Dept: PHYSICAL THERAPY | Age: 74
Discharge: HOME | End: 2019-01-08
Payer: MEDICARE

## 2019-01-08 PROCEDURE — 97110 THERAPEUTIC EXERCISES: CPT

## 2019-01-08 NOTE — PROGRESS NOTES
PT DAILY TREATMENT NOTE 10-18 Patient Name: Dallas España Date:2019 : 1945 [x]  Patient  Verified Payor: [place] MEDICARE / Plan: Kaylee Soliman / Product Type: Medicare / In time:205  Out time:245 Total Treatment Time (min): 40 Visit #: 1 of 8 Medicare/BCBS Only Total Timed Codes (min):  40 1:1 Treatment Time:  45 Treatment Area: Low back pain [M54.5] SUBJECTIVE Pain Level (0-10 scale): 0 Any medication changes, allergies to medications, adverse drug reactions, diagnosis change, or new procedure performed?: [x] No    [] Yes (see summary sheet for update) Subjective functional status/changes:   [] No changes reported I've had a horrible sinus infection for weeks. OBJECTIVE 40 min Therapeutic Exercise:  [] See flow sheet :  
Rationale: increase ROM, increase strength, improve coordination, improve balance and increase proprioception to improve the patients ability to perform daily activities With 
 [] TE 
 [] TA 
 [] neuro 
 [] other: Patient Education: [x] Review HEP [] Progressed/Changed HEP based on:  
[] positioning   [] body mechanics   [] transfers   [] heat/ice application   
[] other:   
 
Other Objective/Functional Measures:   
 
Pain Level (0-10 scale) post treatment: 0 
 
ASSESSMENT/Changes in Function: Fatigued quickly today 2/2 recent illness. Attempted Fig 4 stretch on reformer;unable to perform. Patient may benefit from performing seated fig 4 stretch. Patient will continue to benefit from skilled PT services to modify and progress therapeutic interventions, address functional mobility deficits, address ROM deficits, address strength deficits, analyze and address soft tissue restrictions, analyze and cue movement patterns and assess and modify postural abnormalities to attain remaining goals. []  See Plan of Care 
[]  See progress note/recertification 
[]  See Discharge Summary Progress towards goals / Updated goals: 
Updated Goals to be accomplished in 4 weeks: 
            1. Improve FOTO to 55 to indicate improved function with daily activities. -not yet reassessed 2. Increase B glute strength to grossly 4+/5 to enable patient to negotiate several flights of stairs without difficulty. -progressing, bilateral glut max: 3/5. Glut med, left: 3+/5. Right, 4-/5 (12/18/2018); no change 1/8/19 3. Perform core exercises without minimal rectus bulge to indicate improved TA to enable patient to stand for 1 hour. -continues with rectus bulge (12/14/2018) improving, able to correct post cueing (12/18/2018); minimal rectus bulge with supine exercises 1/8/19 
  
 
 
 
PLAN 
[]  Upgrade activities as tolerated     [x]  Continue plan of care 
[]  Update interventions per flow sheet      
[]  Discharge due to:_ 
[]  Other:_ Neela Dnag, PT 1/8/2019  2:21 PM 
 
Future Appointments Date Time Provider Pastora Mcmahan 1/11/2019  2:00 PM Haleigh Desai [facility]  
1/15/2019  2:00 PM Haleigh Desai [facility]  
1/18/2019  2:00 [facility]  
1/22/2019  2:00 PM Haleigh Desai [facility]  
1/25/2019  2:00 PM Haleigh Desai [facility]

## 2019-01-11 ENCOUNTER — HOSPITAL ENCOUNTER (OUTPATIENT)
Dept: PHYSICAL THERAPY | Age: 74
Discharge: HOME | End: 2019-01-11
Payer: MEDICARE

## 2019-01-11 PROCEDURE — 97116 GAIT TRAINING THERAPY: CPT

## 2019-01-11 PROCEDURE — 97110 THERAPEUTIC EXERCISES: CPT

## 2019-01-11 NOTE — PROGRESS NOTES
PT DAILY TREATMENT NOTE 10-18 Patient Name: Yesy Salazar Date:2019 : 1945 [x]  Patient  Verified Payor: [place] MEDICARE / Plan: Kaylee Garcia [facility] / Product Type: Medicare / In time:2:04  Out time:2:50 Total Treatment Time (min): 46 Visit #: 2 of 8 Medicare/BCBS Only Total Timed Codes (min):  46 1:1 Treatment Time:  40 Treatment Area: Low back pain [M54.5] SUBJECTIVE Pain Level (0-10 scale): 0 Any medication changes, allergies to medications, adverse drug reactions, diagnosis change, or new procedure performed?: [x] No    [] Yes (see summary sheet for update) Subjective functional status/changes:   [] No changes reported Patient reports that she is feeling slightly better as compared to last visit. OBJECTIVE 36 min Therapeutic Exercise:  [x] See flow sheet :  
Rationale: increase ROM, increase strength and improve coordination to improve the patients ability to increase ease with ADLs 10 min Gait Trainin feet void device on level surfaces with supervision level of assist. Dynamic gait: retro and lateral ambulation Rationale: With 
 [] TE 
 [] TA 
 [] neuro 
 [] other: Patient Education: [x] Review HEP [] Progressed/Changed HEP based on:  
[] positioning   [] body mechanics   [] transfers   [] heat/ice application   
[] other:   
 
Other Objective/Functional Measures: Added figure four seated stretch Pain Level (0-10 scale) post treatment: 0 
 
ASSESSMENT/Changes in Function:  
Patient with improved standing tolerance and decreased Trendelenburg gait void of AD. She has more vigor today as compared to last visit as she reports she is gradually getting over illness. We dicussed that she practice short distances at home void of AD and gradually progress towards longer distances void of AD.   
 
Patient will continue to benefit from skilled PT services to modify and progress therapeutic interventions, address functional mobility deficits, address ROM deficits, address strength deficits, analyze and address soft tissue restrictions, analyze and cue movement patterns, analyze and modify body mechanics/ergonomics and assess and modify postural abnormalities to attain remaining goals. []  See Plan of Care 
[]  See progress note/recertification 
[]  See Discharge Summary Progress towards goals / Updated goals: 
Updated Goals to be accomplished in 4 weeks: 
            1. Improve FOTO to 55 to indicate improved function with daily activities. -not yet reassessed 2. Increase B glute strength to grossly 4+/5 to enable patient to negotiate several flights of stairs without difficulty. -progressing, bilateral glut max: 3/5. Glut med, left: 3+/5. Right, 4-/5 (12/18/2018); no change 1/8/19 3. Perform core exercises without minimal rectus bulge to indicate improved TA to enable patient to stand for 1 hour. -continues with rectus bulge (12/14/2018) improving, able to correct post cueing (12/18/2018); minimal rectus bulge with supine exercises 1/8/19 PLAN 
[]  Upgrade activities as tolerated     [x]  Continue plan of care 
[]  Update interventions per flow sheet      
[]  Discharge due to:_ 
[]  Other:_   
 
Windy Mood 1/11/2019  2:07 PM 
 
Future Appointments Date Time Provider Pastora Mcmahan 1/15/2019  2:00 PM Preston Tse [facility]  
1/18/2019  2:00 PM Germania Abdul PT [facility]  
1/22/2019  2:00 PM Preston Tse [facility]  
1/25/2019  2:00 PM Preston Tse [facility]

## 2019-01-15 ENCOUNTER — HOSPITAL ENCOUNTER (OUTPATIENT)
Dept: PHYSICAL THERAPY | Age: 74
Discharge: HOME | End: 2019-01-15
Payer: MEDICARE

## 2019-01-15 PROCEDURE — 97110 THERAPEUTIC EXERCISES: CPT

## 2019-01-15 PROCEDURE — 97116 GAIT TRAINING THERAPY: CPT

## 2019-01-15 NOTE — PROGRESS NOTES
PT DAILY TREATMENT NOTE 10-18 Patient Name: Crystal Win Date:1/15/2019 : 1945 [x]  Patient  Verified Payor: [place] MEDICARE / Plan: Kaylee Soliman / Product Type: Medicare / In time:2:00  Out time:2:52 Total Treatment Time (min): 52 Visit #: 3 of 8 Medicare/BCBS Only Total Timed Codes (min):  52 1:1 Treatment Time:  30 Treatment Area: Low back pain [M54.5] SUBJECTIVE Pain Level (0-10 scale): 0 Any medication changes, allergies to medications, adverse drug reactions, diagnosis change, or new procedure performed?: [x] No    [] Yes (see summary sheet for update) Subjective functional status/changes:   [] No changes reported Patient reports no new complaints. OBJECTIVE 42 min Therapeutic Exercise:  [x] See flow sheet :  
Rationale: increase ROM, increase strength and improve coordination to improve the patients ability to increase ease with ADLs 10 min Gait Training:   Clinic distances void of assistive device on level surfaces with mod I level of assist. High marches, retro ambulation. Lateral side stepping with peach band Rationale: With 
 [] TE 
 [] TA 
 [] neuro 
 [] other: Patient Education: [x] Review HEP [] Progressed/Changed HEP based on:  
[] positioning   [] body mechanics   [] transfers   [] heat/ice application   
[] other:   
 
Other Objective/Functional Measures: FOTO: 61  
 
Pain Level (0-10 scale) post treatment: 0 
 
ASSESSMENT/Changes in Function:  
Patient able to ambulate clinic distances with minimal Trendelenberg gait pattern noted. FOTO score has exceeded predicted score.   
 
Patient will continue to benefit from skilled PT services to modify and progress therapeutic interventions, address functional mobility deficits, address ROM deficits, address strength deficits, analyze and address soft tissue restrictions, analyze and cue movement patterns, analyze and modify body mechanics/ergonomics and assess and modify postural abnormalities to attain remaining goals. []  See Plan of Care 
[]  See progress note/recertification 
[]  See Discharge Summary Progress towards goals / Updated goals: 
Updated Goals to be accomplished in 4 weeks: 
            1. Improve FOTO to 55 to indicate improved function with daily activities. -not yet reassessed. Met, score to 61 (1/15/2019) 2. Increase B glute strength to grossly 4+/5 to enable patient to negotiate several flights of stairs without difficulty. -progressing, bilateral glut max: 3/5. Glut med, left: 3+/5. Right, 4-/5 (12/18/2018); no change 1/8/19 3. Perform core exercises without minimal rectus bulge to indicate improved TA to enable patient to stand for 1 hour. -continues with rectus bulge (12/14/2018) improving, able to correct post cueing (12/18/2018); minimal rectus bulge with supine exercises 1/8/19 PLAN 
[]  Upgrade activities as tolerated     [x]  Continue plan of care 
[]  Update interventions per flow sheet      
[]  Discharge due to:_ 
[]  Other:_   
 
Jocelyn Da Silva 1/15/2019  2:00 PM 
 
Future Appointments Date Time Provider Pastora Mcmahan 1/18/2019  2:00 PM Danielle Torres PT [facility]  
1/22/2019  2:00 PM Sangita Hoyos [facility]  
1/25/2019  2:00 PM Sangita Hoyos [facility]

## 2019-01-18 ENCOUNTER — HOSPITAL ENCOUNTER (OUTPATIENT)
Dept: PHYSICAL THERAPY | Age: 74
Discharge: HOME | End: 2019-01-18
Payer: MEDICARE

## 2019-01-18 PROCEDURE — 97110 THERAPEUTIC EXERCISES: CPT

## 2019-01-18 PROCEDURE — 97112 NEUROMUSCULAR REEDUCATION: CPT

## 2019-01-18 PROCEDURE — 97140 MANUAL THERAPY 1/> REGIONS: CPT

## 2019-01-18 NOTE — PROGRESS NOTES
PT DAILY TREATMENT NOTE 10-18 Patient Name: Denia Fierro Date:2019 : 1945 [x]  Patient  Verified Payor: [place] MEDICARE / Plan: Kaylee Soliman / Product Type: Medicare / In time:200  Out time:241 Total Treatment Time (min): 41 Visit #: 4 of 8 Medicare/BCBS Only Total Timed Codes (min):  41 1:1 Treatment Time:  41  
 
 
Treatment Area: Low back pain [M54.5] SUBJECTIVE Pain Level (0-10 scale): 4 Any medication changes, allergies to medications, adverse drug reactions, diagnosis change, or new procedure performed?: [x] No    [] Yes (see summary sheet for update) Subjective functional status/changes:   [] No changes reported My right butt cheek is hurting me today. I think I sat too long on the couch. OBJECTIVE 8 min Therapeutic Exercise:  [] See flow sheet :  
Rationale: increase ROM and increase strength to improve the patients ability to perform daily activities 25 min Neuromuscular Re-education:  []  See flow sheet :  
Rationale: increase strength, improve coordination and improve balance  to improve the patients ability to recruit TA and glutes 8 min Manual Therapy:  DTM right glute and piriformis,QF Rationale: decrease pain, increase ROM and increase tissue extensibility to perform daily activities With 
 [] TE 
 [] TA 
 [] neuro 
 [] other: Patient Education: [x] Review HEP [] Progressed/Changed HEP based on:  
[] positioning   [] body mechanics   [] transfers   [] heat/ice application   
[] other:   
 
Other Objective/Functional Measures: increased s/l hip repetitions Pain Level (0-10 scale) post treatment: 0 
 
ASSESSMENT/Changes in Function: Strength in B hips is slowly improving, as patient had good tolerance to increase in reps today. Dynamic balance challenged today with crossing feet moving laterally.  Significant mm restrictions noted in right glute/piriformis, but decreased post manual. 
 
 Patient will continue to benefit from skilled PT services to modify and progress therapeutic interventions, address functional mobility deficits, address strength deficits, analyze and address soft tissue restrictions, analyze and cue movement patterns and assess and modify postural abnormalities to attain remaining goals. []  See Plan of Care 
[]  See progress note/recertification 
[]  See Discharge Summary Progress towards goals / Updated goals: 
Updated Goals to be accomplished in 4 weeks: 
            1. Improve FOTO to 55 to indicate improved function with daily activities. -not yet reassessed. Met, score to 61 (1/15/2019) 2. Increase B glute strength to grossly 4+/5 to enable patient to negotiate several flights of stairs without difficulty. -progressing, bilateral glut max: 3/5. Glut med, left: 3+/5. Right, 4-/5 (12/18/2018); no change 1/8/19 3. Perform core exercises without minimal rectus bulge to indicate improved TA to enable patient to stand for 1 hour. -continues with rectus bulge (12/14/2018) improving, able to correct post cueing (12/18/2018); minimal rectus bulge with supine exercises 1/8/19 PLAN 
[]  Upgrade activities as tolerated     [x]  Continue plan of care 
[]  Update interventions per flow sheet      
[]  Discharge due to:_ 
[]  Other:_ Jayce Stewart, PT 1/18/2019  2:20 PM 
 
Future Appointments Date Time Provider Pastora Mcmahan 1/22/2019  2:00 PM Sada Yanes [facility]  
1/25/2019  2:00 PM Sada Yanes [facility]

## 2019-01-22 ENCOUNTER — HOSPITAL ENCOUNTER (OUTPATIENT)
Dept: PHYSICAL THERAPY | Age: 74
Discharge: HOME | End: 2019-01-22
Payer: MEDICARE

## 2019-01-22 PROCEDURE — 97112 NEUROMUSCULAR REEDUCATION: CPT

## 2019-01-22 PROCEDURE — 97110 THERAPEUTIC EXERCISES: CPT

## 2019-01-22 NOTE — PROGRESS NOTES
PT DAILY TREATMENT NOTE 10-18 Patient Name: Gregory Mills Date:2019 : 1945 [x]  Patient  Verified Payor: [place] MEDICARE / Plan: Kaylee Garcia [facility] / Product Type: Medicare / In time:2:00  Out time:2:48 Total Treatment Time (min): 48 Visit #: 5 of 8 Medicare/BCBS Only Total Timed Codes (min):  48 1:1 Treatment Time:  48 Treatment Area: Low back pain [M54.5] SUBJECTIVE Pain Level (0-10 scale): 0 Any medication changes, allergies to medications, adverse drug reactions, diagnosis change, or new procedure performed?: [x] No    [] Yes (see summary sheet for update) Subjective functional status/changes:   [] No changes reported Pt reports feeling better than last visit OBJECTIVE 38 min Therapeutic Exercise:  [x] See flow sheet :  
Rationale: increase ROM, increase strength and improve balance to improve the patients ability to perform ADLs 10 min Neuromuscular Re-education:  [x]  See flow sheet :  
Rationale: increase ROM, increase strength, improve balance and increase proprioception  to improve the patients ability to perform daily tasks With 
 [] TE 
 [] TA 
 [] neuro 
 [] other: Patient Education: [x] Review HEP [] Progressed/Changed HEP based on:  
[] positioning   [] body mechanics   [] transfers   [] heat/ice application   
[] other:   
 
Other Objective/Functional Measures:  
  
 
Pain Level (0-10 scale) post treatment: 0 
 
ASSESSMENT/Changes in Function: Pt performed well therex today without reports of incr'd pain. Challenged with maintaining TA activation with supine exercises. Dynamic gait activities were challenging and required extra time to steady self but no LOB noted.   
 
Patient will continue to benefit from skilled PT services to modify and progress therapeutic interventions, address functional mobility deficits, address strength deficits, analyze and address soft tissue restrictions, analyze and cue movement patterns, analyze and modify body mechanics/ergonomics, assess and modify postural abnormalities and address imbalance/dizziness to attain remaining goals. []  See Plan of Care 
[]  See progress note/recertification 
[]  See Discharge Summary Progress towards goals / Updated goals: 
Updated Goals to be accomplished in 4 weeks: 
            1. Improve FOTO to 55 to indicate improved function with daily activities. -not yet reassessed. Met, score to 61 (1/15/2019) 2. Increase B glute strength to grossly 4+/5 to enable patient to negotiate several flights of stairs without difficulty. -progressing, bilateral glut max: 3/5. Glut med, left: 3+/5. Right, 4-/5 (12/18/2018); no change 1/8/19 3. Perform core exercises without minimal rectus bulge to indicate improved TA to enable patient to stand for 1 hour. -continues with rectus bulge (12/14/2018) improving, able to correct post cueing (12/18/2018); minimal rectus bulge with supine exercises 1/8/19 PLAN [x]  Upgrade activities as tolerated     [x]  Continue plan of care 
[]  Update interventions per flow sheet      
[]  Discharge due to:_ 
[]  Other:_ Josh Collier PTA 1/22/2019  2:06 PM 
 
Future Appointments Date Time Provider Pastora Mcmahan 1/25/2019  2:00 [facility]

## 2019-01-25 ENCOUNTER — HOSPITAL ENCOUNTER (OUTPATIENT)
Dept: PHYSICAL THERAPY | Age: 74
Discharge: HOME | End: 2019-01-25
Payer: MEDICARE

## 2019-01-25 PROCEDURE — 97112 NEUROMUSCULAR REEDUCATION: CPT

## 2019-01-25 PROCEDURE — 97110 THERAPEUTIC EXERCISES: CPT

## 2019-01-25 NOTE — PROGRESS NOTES
PT DAILY TREATMENT NOTE 10-18 Patient Name: Niki Dixon Date:2019 : 1945 [x]  Patient  Verified Payor: [place] MEDICARE / Plan: Kaylee Soliman / Product Type: Medicare / In time:2:01  Out time:2:39 Total Treatment Time (min): 38 Visit #: 6 of 8 Medicare/BCBS Only Total Timed Codes (min):  38 1:1 Treatment Time:  32 Treatment Area: Low back pain [M54.5] SUBJECTIVE Pain Level (0-10 scale): 0 Any medication changes, allergies to medications, adverse drug reactions, diagnosis change, or new procedure performed?: [x] No    [] Yes (see summary sheet for update) Subjective functional status/changes:   [] No changes reported Patient reports that she is utilizing her SPC less and less. OBJECTIVE 28 min Therapeutic Exercise:  [x] See flow sheet :  
Rationale: increase ROM, increase strength and improve coordination to improve the patients ability to increase ease with ADLs 10 min Neuromuscular Re-education:  [x]  See flow sheet : dynamic gait: high marches, retro ambulation, lateral side steps with orange band Rationale: increase strength, improve coordination, improve balance and increase proprioception  to improve the patients ability to improve stability void of AD With 
 [] TE 
 [] TA 
 [] neuro 
 [] other: Patient Education: [x] Review HEP [] Progressed/Changed HEP based on:  
[] positioning   [] body mechanics   [] transfers   [] heat/ice application   
[] other:   
 
Other Objective/Functional Measures:  
glut med, right: 3+/5. Left, 4-/5. Bilateral glut max: 3+/5 Pain Level (0-10 scale) post treatment: 0 
 
ASSESSMENT/Changes in Function:  
Patient has been a pleasure to treat and is discharged today with updated HEP.  She demonstrates much improved standing tolerance and glut max strength has improved since initial eval. Gait quality at clinic distances also much improved with decreased Trendelenburg noted and upright posture more easily attained. FOTO score has also exceeded predicted score indicating functional improvement. Patient will continue to benefit from skilled PT services to modify and progress therapeutic interventions, address functional mobility deficits, address ROM deficits, address strength deficits, analyze and address soft tissue restrictions, analyze and cue movement patterns, analyze and modify body mechanics/ergonomics and assess and modify postural abnormalities to attain remaining goals. []  See Plan of Care 
[]  See progress note/recertification 
[x]  See Discharge Summary Progress towards goals / Updated goals: 
Updated Goals to be accomplished in 4 weeks: 
            1. Improve FOTO to 55 to indicate improved function with daily activities. -not yet reassessed. Met, score to 61 (1/15/2019) 2. Increase B glute strength to grossly 4+/5 to enable patient to negotiate several flights of stairs without difficulty. -progressing, bilateral glut max: 3/5. Glut med, left: 3+/5. Right, 4-/5 (12/18/2018); no change 1/8/19 glut med, right: 3+/5. Left, 4-/5. Bilateral glut max: 3+/5 (1/25/2019) 3. Perform core exercises without minimal rectus bulge to indicate improved TA to enable patient to stand for 1 hour. -continues with rectus bulge (12/14/2018) improving, able to correct post cueing (12/18/2018); minimal rectus bulge with supine exercises 1/8/19 met, able to perform supine core exercises void of rectus and she also reports improved standing tolerance (1/25/019) PLAN 
[]  Upgrade activities as tolerated     []  Continue plan of care 
[]  Update interventions per flow sheet [x]  Discharge  
[]  Other:_   
 
Leelee Mancuso 1/25/2019  2:04 PM 
 
No future appointments.

## 2019-01-29 NOTE — PROGRESS NOTES
[address]  [address] [place], [address] 
[phone] fax Physical Therapy Discharge Summary Patient name: Denia Fierro Start of Care: 18 Referral source: Brooklynn Moore MD : 1945 Medical/Treatment Diagnosis: Low back pain [M54.5] Payor: Ramses Art / Plan: [place] MEDICARE PART A & B / Product Type: Medicare /  Onset Date:18 Prior Hospitalization: see medical history Provider#: [id number] Medications: Verified on Patient Summary List   
Comorbidities: HTN; DM 
Prior Level of Function:Ambulated without AD; I with all activities; participated in exercise class for seniors 3x/week Visits from Start of Care: 14    Missed Visits: 0 Reporting Period : 18 to 19 Summary of Care: 
Patient has been a pleasure to treat and is discharged today with updated HEP. She demonstrates much improved standing tolerance and glut max strength has improved since initial eval. Gait quality at clinic distances also much improved with decreased Trendelenburg noted and upright posture more easily attained. FOTO score has also exceeded predicted score indicating functional improvement.  
  
 1. Improve FOTO to 55 to indicate improved function with daily activities. -not yet reassessed. Met, score to 61 (1/15/2019) 2. Increase B glute strength to grossly 4+/5 to enable patient to negotiate several flights of stairs without difficulty. -progressing, bilateral glut max: 3/5. Glut med, left: 3+/5. Right, 4-/5 (2018); no change 19 glut med, right: 3+/5. Left, 4-/5. Bilateral glut max: 3+/5 (2019) 3.  Perform core exercises without minimal rectus bulge to indicate improved TA to enable patient to stand for 1 hour. -continues with rectus bulge (2018) improving, able to correct post cueing (2018); minimal rectus bulge with supine exercises 19 met, able to perform supine core exercises void of rectus and she also reports improved standing tolerance (1/25/019) 
  
G-Codes (GP) Mobility  Goal  CK= 40-59%  D/C  CJ= 20-39% The severity rating is based on clinical judgment and the FOTO score. ASSESSMENT/RECOMMENDATIONS: 
[x]Discontinue therapy: [x]Patient has reached or is progressing toward set goals []Patient is non-compliant or has abdicated 
    []Due to lack of appreciable progress towards set goals Marcos Mays, PT 1/29/2019 1:52 PM

## 2020-09-01 NOTE — H&P (VIEW-ONLY)
[address], [address] [facility], [address] [facility] 
[address] [facility], [address] [place], [address] [facility] 
[phone] Ravinder Clements DO Patient ID: 
Name:  Darrel Noble MRN:  [id number] :  1945/75 y.o. Date:  2020 HISTORY OF PRESENT ILLNESS: 
Darrel Noble is a 66 y.o. }  postmenopausal female referred by Dr. Roe Roberts for endometrial cancer. Was seen for pelvic pain and had an ultrasound revealing thickened endometrium. S/P hysteroscopy/D&C 2020 with findings of Grade 1 Endometrial adenocarcinoma. She is here today for further evaluation. Currently, Denies Vaginal bleeding, change in vaginal discharge, new abdominopelvic pain, change in bladder/bowel habits Labs: 
2020 SURGICAL PATHOLOGY CURETTAGE, ENDOMETRIUM: 
  - ENDOMETRIOID ADENOCARCINOMA, FIGO GRADE 1. Imaging 
none ROS:  
As above Patient Active Problem List  
 Diagnosis Date Noted  Severe obesity ([address] 2020  Lumbar stenosis 2018 Past Medical History:  
Diagnosis Date  Diabetes ([address]  Hypercholesteremia  Hypertension Past Surgical History:  
Procedure Laterality Date  HX GYN    
 D & C   
 HX ROTATOR CUFF REPAIR    
 NEUROLOGICAL PROCEDURE UNLISTED Back surgery pinched nerve OB History No obstetric history on file. Social History Tobacco Use  Smoking status: Former Smoker Last attempt to quit: 2002 Years since quittin.0  Smokeless tobacco: Never Used Substance Use Topics  Alcohol use: No  
  Comment: occ. Family History Problem Relation Age of Onset  Cancer Maternal Grandmother Breast   
  
Current Outpatient Medications Medication Sig  colestipoL (COLESTID) 1 gram tablet Take 1 g by mouth two (2) times a day.  furosemide (LASIX) 20 mg tablet Take 1-2 daily prn edema  insulin aspart U-100 (NOVOLOG) 100 unit/mL (3 mL) inpn 3-6 Units by SubCUTAneous route.  umeclidinium-vilanteroL (ANORO ELLIPTA) 62.5-25 mcg/actuation inhaler Take  by inhalation daily.  atorvastatin (LIPITOR) 20 mg tablet Take 20 mg by mouth daily.  LANTUS SOLOSTAR U-100 INSULIN 100 unit/mL (3 mL) inpn 16 Units by SubCUTAneous route nightly.  NOVOLOG FLEXPEN U-100 INSULIN 100 unit/mL inpn 1-6 Units by SubCUTAneous route Before breakfast, lunch, and dinner.  amLODIPine-benazepril (LOTREL) 10-20 mg per capsule Take 1 Cap by mouth daily.  aspirin delayed-release 81 mg tablet Take 81 mg by mouth daily.  ferrous sulfate (IRON) 325 mg (65 mg iron) tablet Take 65 mg by mouth two (2) times a day.  oxyCODONE-acetaminophen (PERCOCET 10)  mg per tablet Take 1 Tab by mouth every eight (8) hours as needed for Pain.  bisacodyl (DULCOLAX, BISACODYL,) 5 mg EC tablet Take 5 mg by mouth two (2) times a day.  ezetimibe (ZETIA) 10 mg tablet Take 10 mg by mouth every three (3) days.  cholecalciferol, vitamin D3, 2,000 unit tab Take 2,000 Units by mouth daily.  omega-3 acid ethyl esters (LOVAZA) 1 gram capsule Take 1 g by mouth daily (with breakfast). No current facility-administered medications for this visit. No Known Allergies OBJECTIVE: 
 
Physical Exam 
VITAL SIGNS: Visit Vitals /71 (BP 1 Location: Right arm, BP Patient Position: Sitting) Pulse 79 Temp 97.4 °F (36.3 °C) (Oral) Resp 14 Ht 5' 5\" (1.651 m) Wt 102.1 kg (225 lb) SpO2 99% BMI 37.44 kg/m² GENERAL REILLY: in no apparent distress and well developed and well nourished MUSCULOSKEL: no joint tenderness, deformity or swelling INTEGUMENT:  warm and dry, no rashes or lesions ABDOMEN . soft, NT, ND, No masses appreciated EXTREMITIES: extremities normal, atraumatic, no cyanosis or edema PELVIC: Vulva and vagina appear normal. Bimanual exam reveals normal uterus and adnexa. RECTAL: deferred CRISELDA SURVEY: Cervical, supraclavicular, axillary and inguinal nodes normal.  
NEURO: Grossly normal  
 
 
 
IMPRESSION/PLAN: 
1. 1. Grade 1 Endometrial Cancer 
 -reviewed her pathology and discussed likelihood that it is confined to uterus and favorable prognosis 
 -discussed recommendation for hysterectomy/bso sentinel lymph node biopsy 
 -discussed robotic approach 
 -briefly discussed possible although unlikely need for adjuvant treatment 
 -Risks, benefits and alternatives of surgery discussed in detail 
 -all of patients questions and concerns addressed The total time spent was 60 minutes regarding this patients diagnosis of endometrial cancer and >50% of this time was spent counseling and coordinating care Jordin Chappell DO Gynecologic Oncology 8/8/91874:37 PM

## 2020-09-01 NOTE — PROGRESS NOTES
[address] [facility]  [address] [place] [address], [address], [address] [facility]  [address] [facility], [address] [place] [address]  [place], [address]   [phone]  Calvin Almonte DO      Patient ID:  Name:  Janette Torres  MRN:  [id number]  :  1945/75 y.o. Date:  2020      HISTORY OF PRESENT ILLNESS:  Janette Torres is a 66 y.o. }  postmenopausal female referred by Dr. Kasi Wooten for endometrial cancer. Was seen for pelvic pain and had an ultrasound revealing thickened endometrium. S/P hysteroscopy/D&C 2020 with findings of Grade 1 Endometrial adenocarcinoma. She is here today for further evaluation. Currently, Denies Vaginal bleeding, change in vaginal discharge, new abdominopelvic pain, change in bladder/bowel habits      Labs:  2020 SURGICAL PATHOLOGY  CURETTAGE, ENDOMETRIUM:    - ENDOMETRIOID ADENOCARCINOMA, FIGO GRADE 1. Imaging  none       ROS:   As above      Patient Active Problem List    Diagnosis Date Noted    Severe obesity ([address] 2020    Lumbar stenosis 2018     Past Medical History:   Diagnosis Date    Diabetes ([address]     Hypercholesteremia     Hypertension       Past Surgical History:   Procedure Laterality Date    HX GYN      D & C     HX ROTATOR CUFF REPAIR      NEUROLOGICAL PROCEDURE UNLISTED      Back surgery pinched nerve       OB History    No obstetric history on file. Social History     Tobacco Use    Smoking status: Former Smoker     Last attempt to quit: 2002     Years since quittin.0    Smokeless tobacco: Never Used   Substance Use Topics    Alcohol use: No     Comment: occ. Family History   Problem Relation Age of Onset    Cancer Maternal Grandmother         Breast       Current Outpatient Medications   Medication Sig    colestipoL (COLESTID) 1 gram tablet Take 1 g by mouth two (2) times a day.     furosemide (LASIX) 20 mg tablet Take 1-2 daily prn edema    insulin aspart U-100 (NOVOLOG) 100 unit/mL (3 mL) inpn 3-6 Units by SubCUTAneous route.  umeclidinium-vilanteroL (ANORO ELLIPTA) 62.5-25 mcg/actuation inhaler Take  by inhalation daily.  atorvastatin (LIPITOR) 20 mg tablet Take 20 mg by mouth daily.  LANTUS SOLOSTAR U-100 INSULIN 100 unit/mL (3 mL) inpn 16 Units by SubCUTAneous route nightly.  NOVOLOG FLEXPEN U-100 INSULIN 100 unit/mL inpn 1-6 Units by SubCUTAneous route Before breakfast, lunch, and dinner.  amLODIPine-benazepril (LOTREL) 10-20 mg per capsule Take 1 Cap by mouth daily.  aspirin delayed-release 81 mg tablet Take 81 mg by mouth daily.  ferrous sulfate (IRON) 325 mg (65 mg iron) tablet Take 65 mg by mouth two (2) times a day.  oxyCODONE-acetaminophen (PERCOCET 10)  mg per tablet Take 1 Tab by mouth every eight (8) hours as needed for Pain.  bisacodyl (DULCOLAX, BISACODYL,) 5 mg EC tablet Take 5 mg by mouth two (2) times a day.  ezetimibe (ZETIA) 10 mg tablet Take 10 mg by mouth every three (3) days.  cholecalciferol, vitamin D3, 2,000 unit tab Take 2,000 Units by mouth daily.  omega-3 acid ethyl esters (LOVAZA) 1 gram capsule Take 1 g by mouth daily (with breakfast). No current facility-administered medications for this visit. No Known Allergies       OBJECTIVE:    Physical Exam  VITAL SIGNS: Visit Vitals  /71 (BP 1 Location: Right arm, BP Patient Position: Sitting)   Pulse 79   Temp 97.4 °F (36.3 °C) (Oral)   Resp 14   Ht 5' 5\" (1.651 m)   Wt 102.1 kg (225 lb)   SpO2 99%   BMI 37.44 kg/m²      GENERAL REILLY: in no apparent distress and well developed and well nourished   MUSCULOSKEL: no joint tenderness, deformity or swelling   INTEGUMENT:  warm and dry, no rashes or lesions   ABDOMEN . soft, NT, ND, No masses appreciated   EXTREMITIES: extremities normal, atraumatic, no cyanosis or edema   PELVIC: Vulva and vagina appear normal. Bimanual exam reveals normal uterus and adnexa.    RECTAL: deferred   CRISELDA SURVEY: Cervical, supraclavicular, axillary and inguinal nodes normal.   NEURO: Grossly normal         IMPRESSION/PLAN:  1. 1. Grade 1 Endometrial Cancer   -reviewed her pathology and discussed likelihood that it is confined to uterus and favorable prognosis   -discussed recommendation for hysterectomy/bso sentinel lymph node biopsy   -discussed robotic approach   -briefly discussed possible although unlikely need for adjuvant treatment   -Risks, benefits and alternatives of surgery discussed in detail   -all of patients questions and concerns addressed        The total time spent was 60 minutes regarding this patients diagnosis of endometrial cancer and >50% of this time was spent counseling and coordinating care    [address] [place] [address] Oncology  3/1/82944:56 PM

## 2020-09-09 ENCOUNTER — OFFICE VISIT (OUTPATIENT)
Dept: ONCOLOGY | Age: 75
End: 2020-09-09

## 2020-09-09 ENCOUNTER — HOSPITAL ENCOUNTER (OUTPATIENT)
Dept: LAB | Age: 75
Discharge: HOME | End: 2020-09-09
Payer: MEDICARE

## 2020-09-09 VITALS
HEIGHT: 65 IN | TEMPERATURE: 97.4 F | SYSTOLIC BLOOD PRESSURE: 144 MMHG | HEART RATE: 79 BPM | RESPIRATION RATE: 14 BRPM | BODY MASS INDEX: 37.49 KG/M2 | DIASTOLIC BLOOD PRESSURE: 71 MMHG | OXYGEN SATURATION: 99 % | WEIGHT: 225 LBS

## 2020-09-09 DIAGNOSIS — E66.01 SEVERE OBESITY (HCC): ICD-10-CM

## 2020-09-09 DIAGNOSIS — Z01.818 PREOP TESTING: ICD-10-CM

## 2020-09-09 DIAGNOSIS — C54.1 ENDOMETRIAL CANCER (HCC): Primary | ICD-10-CM

## 2020-09-09 DIAGNOSIS — E13.9 OTHER SPECIFIED DIABETES MELLITUS WITHOUT COMPLICATIONS (HCC): ICD-10-CM

## 2020-09-09 LAB
ABO + RH BLD: NORMAL
ANION GAP SERPL CALC-SCNC: 6 MMOL/L (ref 3–18)
ATRIAL RATE: 72 BPM
BASOPHILS # BLD: 0 K/UL (ref 0–0.1)
BASOPHILS NFR BLD: 0 % (ref 0–2)
BLOOD GROUP ANTIBODIES SERPL: NORMAL
BUN SERPL-MCNC: 13 MG/DL (ref 7–18)
BUN/CREAT SERPL: 13 (ref 12–20)
CALCIUM SERPL-MCNC: 8.8 MG/DL (ref 8.5–10.1)
CALCULATED P AXIS, ECG09: 66 DEGREES
CALCULATED T AXIS, ECG11: 22 DEGREES
CHLORIDE SERPL-SCNC: 105 MMOL/L (ref 100–111)
CO2 SERPL-SCNC: 27 MMOL/L (ref 21–32)
CREAT SERPL-MCNC: 1.01 MG/DL (ref 0.6–1.3)
DIAGNOSIS, 93000: NORMAL
DIFFERENTIAL METHOD BLD: NORMAL
EOSINOPHIL # BLD: 0.1 K/UL (ref 0–0.4)
EOSINOPHIL NFR BLD: 2 % (ref 0–5)
ERYTHROCYTE [DISTWIDTH] IN BLOOD BY AUTOMATED COUNT: 14.5 % (ref 11.6–14.5)
EST. AVERAGE GLUCOSE BLD GHB EST-MCNC: 183 MG/DL
GLUCOSE SERPL-MCNC: 254 MG/DL (ref 74–99)
HBA1C MFR BLD: 8 % (ref 4.2–5.6)
HCT VFR BLD AUTO: 40.3 % (ref 35–45)
HGB BLD-MCNC: 13.3 G/DL (ref 12–16)
LYMPHOCYTES # BLD: 1.9 K/UL (ref 0.9–3.6)
LYMPHOCYTES NFR BLD: 30 % (ref 21–52)
MCH RBC QN AUTO: 27.7 PG (ref 24–34)
MCHC RBC AUTO-ENTMCNC: 33 G/DL (ref 31–37)
MCV RBC AUTO: 84 FL (ref 74–97)
MONOCYTES # BLD: 0.4 K/UL (ref 0.05–1.2)
MONOCYTES NFR BLD: 7 % (ref 3–10)
NEUTS SEG # BLD: 3.9 K/UL (ref 1.8–8)
NEUTS SEG NFR BLD: 61 % (ref 40–73)
P-R INTERVAL, ECG05: 170 MS
PLATELET # BLD AUTO: 223 K/UL (ref 135–420)
PMV BLD AUTO: 11.7 FL (ref 9.2–11.8)
POTASSIUM SERPL-SCNC: 3.8 MMOL/L (ref 3.5–5.5)
Q-T INTERVAL, ECG07: 388 MS
QRS DURATION, ECG06: 74 MS
QTC CALCULATION (BEZET), ECG08: 424 MS
RBC # BLD AUTO: 4.8 M/UL (ref 4.2–5.3)
SODIUM SERPL-SCNC: 138 MMOL/L (ref 136–145)
SPECIMEN EXP DATE BLD: NORMAL
VENTRICULAR RATE, ECG03: 72 BPM
WBC # BLD AUTO: 6.4 K/UL (ref 4.6–13.2)

## 2020-09-09 PROCEDURE — 85025 COMPLETE CBC W/AUTO DIFF WBC: CPT

## 2020-09-09 PROCEDURE — 36415 COLL VENOUS BLD VENIPUNCTURE: CPT

## 2020-09-09 PROCEDURE — 80048 BASIC METABOLIC PNL TOTAL CA: CPT

## 2020-09-09 PROCEDURE — 93005 ELECTROCARDIOGRAM TRACING: CPT

## 2020-09-09 PROCEDURE — 83036 HEMOGLOBIN GLYCOSYLATED A1C: CPT

## 2020-09-09 PROCEDURE — 86900 BLOOD TYPING SEROLOGIC ABO: CPT

## 2020-09-09 RX ORDER — MONTELUKAST SODIUM 4 MG/1
1 TABLET, CHEWABLE ORAL 2 TIMES DAILY
COMMUNITY
Start: 2020-07-27

## 2020-09-09 RX ORDER — INSULIN ASPART 100 [IU]/ML
3-6 INJECTION, SOLUTION INTRAVENOUS; SUBCUTANEOUS
COMMUNITY
Start: 2020-03-17 | End: 2020-09-21

## 2020-09-09 RX ORDER — FUROSEMIDE 20 MG/1
20 TABLET ORAL AS NEEDED
COMMUNITY
Start: 2020-04-03

## 2020-09-09 NOTE — PROGRESS NOTES
Rolly Hutchins is a 66 y.o. female presents in office for referred by Dr. Chacorta Kohler    Chief Complaint   Patient presents with   [address] [place] [address] Patient     referred by Dr. Chacorta Kohler       Patient states had some concerns with right pelvic region. Patient states was referred to Dr. Chacorta Kohler which did a ultrasound with a diagnosis of a blood pocket. Dr. Chacorta Kohler also performed a D&C with surgical pathology with a diagnosis of endometrial cancer as a result. Patient stated had Hx of tubal ligation. Patient states Hx of 1 pregnancies with vaginal birth. Do you have any unusual vaginal bleeding, discharge or irritation? No    Do you have any changes in your bowel movements? No     Have you been experiencing any continuous or worsening abdominal pain?  No    Visit Vitals  /71 (BP 1 Location: Right arm, BP Patient Position: Sitting)   Pulse 79   Temp 97.4 °F (36.3 °C) (Oral)   Resp 14   Ht 5' 5\" (1.651 m)   Wt 225 lb (102.1 kg)   SpO2 99%   BMI 37.44 kg/m²           Health Maintenance Due   Topic Date Due    Lipid Screen  05/22/1953    Shingrix Vaccine Age 50> (1 of 2) 05/22/1993    GLAUCOMA SCREENING Q2Y  05/22/2008    Bone Densitometry (Dexa) Screening  05/22/2008    Medicare Yearly Exam  08/30/2018    Flu Vaccine (1) 09/01/2020       3 most recent PHQ Screens 9/9/2020   Little interest or pleasure in doing things Not at all   Feeling down, depressed, irritable, or hopeless Not at all   Total Score PHQ 2 0

## 2020-09-09 NOTE — LETTER
9/9/20 Patient: Jose Walker YOB: 1945 Date of Visit: 9/9/2020 Claudia Barreto MD 
[address] [facility], [place] [address] [place] [address] VIA Facsimile: [phone] Dear Claudia Barreto MD, Thank you for referring Ms. Jose Walker to [facility] for evaluation. My notes for this consultation are attached. If you have questions, please do not hesitate to call me. I look forward to following your patient along with you. Sincerely, Lola Osorio MD

## 2020-09-14 ENCOUNTER — OFFICE VISIT (OUTPATIENT)
Dept: ONCOLOGY | Age: 75
End: 2020-09-14

## 2020-09-14 VITALS
HEIGHT: 65 IN | BODY MASS INDEX: 39.32 KG/M2 | TEMPERATURE: 98 F | HEART RATE: 105 BPM | DIASTOLIC BLOOD PRESSURE: 73 MMHG | SYSTOLIC BLOOD PRESSURE: 124 MMHG | WEIGHT: 236 LBS

## 2020-09-14 DIAGNOSIS — Z71.89 OTHER SPECIFIED COUNSELING: Primary | ICD-10-CM

## 2020-09-14 NOTE — PROGRESS NOTES
Reviewed consent form for Robotic total laparoscopic hysterectomy, bilateral salping oophorectomy, lymph node biopsy, and staging and information packet for a Robotic total laparoscopic hysterectomy, bilateral salping oophorectomy, lymph node biopsy, and staging scheduled on 9/30/2020 at 09:30 am with an arrival time of 07:30 am. Patient was given information packet that included pre-op and post-op instructions as well as the scheduled date, start time, arrival time, and length of the surgery and signed the consent form. Patient expressed understanding and had no further questions. Patient was encouraged to call if they have questions later. The patient was counseled at length about the risks of misty Covid-19 during their perioperative period and any recovery window from their procedure. The patient was made aware that misty Covid-19  may worsen their prognosis for recovering from their procedure and lend to a higher morbidity and/or mortality risk. All material risks, benefits, and reasonable alternatives including postponing the procedure were discussed. The patient does wish to proceed with the procedure at this time.

## 2020-09-16 ENCOUNTER — TELEPHONE (OUTPATIENT)
Dept: ONCOLOGY | Age: 75
End: 2020-09-16

## 2020-09-16 DIAGNOSIS — Z01.818 PREOPERATIVE TESTING: Primary | ICD-10-CM

## 2020-09-18 ENCOUNTER — HOSPITAL ENCOUNTER (OUTPATIENT)
Dept: PREADMISSION TESTING | Age: 75
Discharge: HOME | End: 2020-09-18
Payer: MEDICARE

## 2020-09-18 PROCEDURE — 87635 SARS-COV-2 COVID-19 AMP PRB: CPT

## 2020-09-19 LAB — SARS-COV-2, COV2NT: NOT DETECTED

## 2020-09-23 ENCOUNTER — ANESTHESIA EVENT (OUTPATIENT)
Dept: SURGERY | Age: 75
End: 2020-09-23
Payer: MEDICARE

## 2020-09-23 ENCOUNTER — TELEPHONE (OUTPATIENT)
Dept: ONCOLOGY | Age: 75
End: 2020-09-23

## 2020-09-23 RX ORDER — INSULIN LISPRO 100 [IU]/ML
INJECTION, SOLUTION INTRAVENOUS; SUBCUTANEOUS ONCE
Status: CANCELLED | OUTPATIENT
Start: 2020-09-23 | End: 2020-09-23

## 2020-09-24 ENCOUNTER — ANESTHESIA (OUTPATIENT)
Dept: SURGERY | Age: 75
End: 2020-09-24
Payer: MEDICARE

## 2020-09-24 ENCOUNTER — HOSPITAL ENCOUNTER (OUTPATIENT)
Age: 75
Setting detail: OUTPATIENT SURGERY
Discharge: HOME | End: 2020-09-24
Attending: OBSTETRICS & GYNECOLOGY | Admitting: OBSTETRICS & GYNECOLOGY
Payer: MEDICARE

## 2020-09-24 VITALS
RESPIRATION RATE: 16 BRPM | WEIGHT: 237.31 LBS | BODY MASS INDEX: 39.54 KG/M2 | OXYGEN SATURATION: 94 % | DIASTOLIC BLOOD PRESSURE: 71 MMHG | HEIGHT: 65 IN | TEMPERATURE: 96.4 F | HEART RATE: 78 BPM | SYSTOLIC BLOOD PRESSURE: 131 MMHG

## 2020-09-24 DIAGNOSIS — C54.1 ENDOMETRIAL CANCER (HCC): ICD-10-CM

## 2020-09-24 DIAGNOSIS — G89.18 POST-OPERATIVE PAIN: Primary | ICD-10-CM

## 2020-09-24 LAB
ABO + RH BLD: NORMAL
BLOOD GROUP ANTIBODIES SERPL: NORMAL
GLUCOSE BLD STRIP.AUTO-MCNC: 129 MG/DL (ref 70–110)
GLUCOSE BLD STRIP.AUTO-MCNC: 144 MG/DL (ref 70–110)
POTASSIUM SERPL-SCNC: 3 MMOL/L (ref 3.5–5.5)
SPECIMEN EXP DATE BLD: NORMAL

## 2020-09-24 PROCEDURE — 38571 LAPAROSCOPY LYMPHADENECTOMY: CPT | Performed by: OBSTETRICS & GYNECOLOGY

## 2020-09-24 PROCEDURE — 77030035277 HC OBTRTR BLDELSS DISP INTU -B: Performed by: OBSTETRICS & GYNECOLOGY

## 2020-09-24 PROCEDURE — 74011250636 HC RX REV CODE- 250/636: Performed by: OBSTETRICS & GYNECOLOGY

## 2020-09-24 PROCEDURE — 77030002966 HC SUT PDS J&J -A: Performed by: OBSTETRICS & GYNECOLOGY

## 2020-09-24 PROCEDURE — 77030006643: Performed by: SPECIALIST

## 2020-09-24 PROCEDURE — 74011250636 HC RX REV CODE- 250/636: Performed by: SPECIALIST

## 2020-09-24 PROCEDURE — 77030002933 HC SUT MCRYL J&J -A: Performed by: OBSTETRICS & GYNECOLOGY

## 2020-09-24 PROCEDURE — 88112 CYTOPATH CELL ENHANCE TECH: CPT

## 2020-09-24 PROCEDURE — 77030016151 HC PROTCTR LNS DFOG COVD -B: Performed by: OBSTETRICS & GYNECOLOGY

## 2020-09-24 PROCEDURE — 77030014063 HC TIP MANIP UTER COOP -B: Performed by: OBSTETRICS & GYNECOLOGY

## 2020-09-24 PROCEDURE — 74011000250 HC RX REV CODE- 250: Performed by: NURSE ANESTHETIST, CERTIFIED REGISTERED

## 2020-09-24 PROCEDURE — 76210000006 HC OR PH I REC 0.5 TO 1 HR: Performed by: OBSTETRICS & GYNECOLOGY

## 2020-09-24 PROCEDURE — 74011250636 HC RX REV CODE- 250/636: Performed by: NURSE ANESTHETIST, CERTIFIED REGISTERED

## 2020-09-24 PROCEDURE — 88331 PATH CONSLTJ SURG 1 BLK 1SPC: CPT

## 2020-09-24 PROCEDURE — 77030040830 HC CATH URETH FOL MDII -A: Performed by: OBSTETRICS & GYNECOLOGY

## 2020-09-24 PROCEDURE — 36415 COLL VENOUS BLD VENIPUNCTURE: CPT

## 2020-09-24 PROCEDURE — 77030040361 HC SLV COMPR DVT MDII -B: Performed by: OBSTETRICS & GYNECOLOGY

## 2020-09-24 PROCEDURE — 84132 ASSAY OF SERUM POTASSIUM: CPT

## 2020-09-24 PROCEDURE — 77030037241 HC PRT ACC BLDLSS AIRSEAL CNMD -B: Performed by: OBSTETRICS & GYNECOLOGY

## 2020-09-24 PROCEDURE — 86900 BLOOD TYPING SEROLOGIC ABO: CPT

## 2020-09-24 PROCEDURE — 76010000875 HC OR TIME 1.5 TO 2HR INTENSV - TIER 2: Performed by: OBSTETRICS & GYNECOLOGY

## 2020-09-24 PROCEDURE — 77030025805 HC MANIP UTER RUMI COOP -B: Performed by: OBSTETRICS & GYNECOLOGY

## 2020-09-24 PROCEDURE — 77030028402 HC SYS LAPSC TISS RETRV AMR -B: Performed by: OBSTETRICS & GYNECOLOGY

## 2020-09-24 PROCEDURE — 2709999900 HC NON-CHARGEABLE SUPPLY: Performed by: OBSTETRICS & GYNECOLOGY

## 2020-09-24 PROCEDURE — 88342 IMHCHEM/IMCYTCHM 1ST ANTB: CPT

## 2020-09-24 PROCEDURE — 74011000258 HC RX REV CODE- 258: Performed by: SPECIALIST

## 2020-09-24 PROCEDURE — 77030008477 HC STYL SATN SLP COVD -A: Performed by: SPECIALIST

## 2020-09-24 PROCEDURE — 74011000250 HC RX REV CODE- 250: Performed by: OBSTETRICS & GYNECOLOGY

## 2020-09-24 PROCEDURE — 88341 IMHCHEM/IMCYTCHM EA ADD ANTB: CPT

## 2020-09-24 PROCEDURE — 76210000024 HC REC RM PH II 2.5 TO 3 HR: Performed by: OBSTETRICS & GYNECOLOGY

## 2020-09-24 PROCEDURE — 76060000034 HC ANESTHESIA 1.5 TO 2 HR: Performed by: OBSTETRICS & GYNECOLOGY

## 2020-09-24 PROCEDURE — 88307 TISSUE EXAM BY PATHOLOGIST: CPT

## 2020-09-24 PROCEDURE — 77030008683 HC TU ET CUF COVD -A: Performed by: SPECIALIST

## 2020-09-24 PROCEDURE — 88309 TISSUE EXAM BY PATHOLOGIST: CPT

## 2020-09-24 PROCEDURE — 77030008574 HC TBNG SUC IRR STRY -B: Performed by: OBSTETRICS & GYNECOLOGY

## 2020-09-24 PROCEDURE — 77030020703 HC SEAL CANN DISP INTU -B: Performed by: OBSTETRICS & GYNECOLOGY

## 2020-09-24 PROCEDURE — 74011000254 HC RX REV CODE- 254: Performed by: OBSTETRICS & GYNECOLOGY

## 2020-09-24 PROCEDURE — 82962 GLUCOSE BLOOD TEST: CPT

## 2020-09-24 PROCEDURE — 74011250637 HC RX REV CODE- 250/637: Performed by: SPECIALIST

## 2020-09-24 PROCEDURE — 58571 TLH W/T/O 250 G OR LESS: CPT | Performed by: OBSTETRICS & GYNECOLOGY

## 2020-09-24 PROCEDURE — 77030020782 HC GWN BAIR PAWS FLX 3M -B: Performed by: OBSTETRICS & GYNECOLOGY

## 2020-09-24 PROCEDURE — C9290 INJ, BUPIVACAINE LIPOSOME: HCPCS | Performed by: OBSTETRICS & GYNECOLOGY

## 2020-09-24 RX ORDER — ROCURONIUM BROMIDE 10 MG/ML
INJECTION, SOLUTION INTRAVENOUS AS NEEDED
Status: DISCONTINUED | OUTPATIENT
Start: 2020-09-24 | End: 2020-09-24 | Stop reason: HOSPADM

## 2020-09-24 RX ORDER — SODIUM CHLORIDE 9 MG/ML
125 INJECTION, SOLUTION INTRAVENOUS CONTINUOUS
Status: DISCONTINUED | OUTPATIENT
Start: 2020-09-24 | End: 2020-09-24 | Stop reason: HOSPADM

## 2020-09-24 RX ORDER — SODIUM CHLORIDE, SODIUM LACTATE, POTASSIUM CHLORIDE, CALCIUM CHLORIDE 600; 310; 30; 20 MG/100ML; MG/100ML; MG/100ML; MG/100ML
125 INJECTION, SOLUTION INTRAVENOUS CONTINUOUS
Status: DISCONTINUED | OUTPATIENT
Start: 2020-09-24 | End: 2020-09-24 | Stop reason: HOSPADM

## 2020-09-24 RX ORDER — CELECOXIB 100 MG/1
200 CAPSULE ORAL ONCE
Status: COMPLETED | OUTPATIENT
Start: 2020-09-24 | End: 2020-09-24

## 2020-09-24 RX ORDER — NEOSTIGMINE METHYLSULFATE 1 MG/ML
INJECTION, SOLUTION INTRAVENOUS AS NEEDED
Status: DISCONTINUED | OUTPATIENT
Start: 2020-09-24 | End: 2020-09-24 | Stop reason: HOSPADM

## 2020-09-24 RX ORDER — SODIUM CHLORIDE, SODIUM LACTATE, POTASSIUM CHLORIDE, CALCIUM CHLORIDE 600; 310; 30; 20 MG/100ML; MG/100ML; MG/100ML; MG/100ML
50 INJECTION, SOLUTION INTRAVENOUS CONTINUOUS
Status: DISCONTINUED | OUTPATIENT
Start: 2020-09-24 | End: 2020-09-24 | Stop reason: HOSPADM

## 2020-09-24 RX ORDER — ONDANSETRON 2 MG/ML
4 INJECTION INTRAMUSCULAR; INTRAVENOUS ONCE
Status: COMPLETED | OUTPATIENT
Start: 2020-09-24 | End: 2020-09-24

## 2020-09-24 RX ORDER — INDOCYANINE GREEN AND WATER 25 MG
KIT INJECTION AS NEEDED
Status: DISCONTINUED | OUTPATIENT
Start: 2020-09-24 | End: 2020-09-24 | Stop reason: HOSPADM

## 2020-09-24 RX ORDER — KETAMINE HYDROCHLORIDE 10 MG/ML
INJECTION, SOLUTION INTRAMUSCULAR; INTRAVENOUS AS NEEDED
Status: DISCONTINUED | OUTPATIENT
Start: 2020-09-24 | End: 2020-09-24 | Stop reason: HOSPADM

## 2020-09-24 RX ORDER — OXYCODONE AND ACETAMINOPHEN 5; 325 MG/1; MG/1
1 TABLET ORAL AS NEEDED
Status: DISCONTINUED | OUTPATIENT
Start: 2020-09-24 | End: 2020-09-24 | Stop reason: HOSPADM

## 2020-09-24 RX ORDER — ONDANSETRON 2 MG/ML
INJECTION INTRAMUSCULAR; INTRAVENOUS AS NEEDED
Status: DISCONTINUED | OUTPATIENT
Start: 2020-09-24 | End: 2020-09-24 | Stop reason: HOSPADM

## 2020-09-24 RX ORDER — HYDROMORPHONE HYDROCHLORIDE 2 MG/ML
0.2 INJECTION, SOLUTION INTRAMUSCULAR; INTRAVENOUS; SUBCUTANEOUS
Status: DISCONTINUED | OUTPATIENT
Start: 2020-09-24 | End: 2020-09-24 | Stop reason: HOSPADM

## 2020-09-24 RX ORDER — HEPARIN SODIUM 5000 [USP'U]/ML
5000 INJECTION, SOLUTION INTRAVENOUS; SUBCUTANEOUS ONCE
Status: COMPLETED | OUTPATIENT
Start: 2020-09-24 | End: 2020-09-24

## 2020-09-24 RX ORDER — MAGNESIUM SULFATE 100 %
16 CRYSTALS MISCELLANEOUS AS NEEDED
Status: DISCONTINUED | OUTPATIENT
Start: 2020-09-24 | End: 2020-09-24 | Stop reason: HOSPADM

## 2020-09-24 RX ORDER — GLYCOPYRROLATE 0.2 MG/ML
INJECTION INTRAMUSCULAR; INTRAVENOUS AS NEEDED
Status: DISCONTINUED | OUTPATIENT
Start: 2020-09-24 | End: 2020-09-24 | Stop reason: HOSPADM

## 2020-09-24 RX ORDER — DEXTROSE MONOHYDRATE 100 MG/ML
125-250 INJECTION, SOLUTION INTRAVENOUS AS NEEDED
Status: DISCONTINUED | OUTPATIENT
Start: 2020-09-24 | End: 2020-09-24 | Stop reason: HOSPADM

## 2020-09-24 RX ORDER — MAGNESIUM SULFATE 100 %
4 CRYSTALS MISCELLANEOUS AS NEEDED
Status: DISCONTINUED | OUTPATIENT
Start: 2020-09-24 | End: 2020-09-24 | Stop reason: HOSPADM

## 2020-09-24 RX ORDER — PREGABALIN 25 MG/1
25 CAPSULE ORAL ONCE
Status: COMPLETED | OUTPATIENT
Start: 2020-09-24 | End: 2020-09-24

## 2020-09-24 RX ORDER — FENTANYL CITRATE 50 UG/ML
50 INJECTION, SOLUTION INTRAMUSCULAR; INTRAVENOUS
Status: DISCONTINUED | OUTPATIENT
Start: 2020-09-24 | End: 2020-09-24 | Stop reason: HOSPADM

## 2020-09-24 RX ORDER — LIDOCAINE HYDROCHLORIDE 20 MG/ML
INJECTION, SOLUTION EPIDURAL; INFILTRATION; INTRACAUDAL; PERINEURAL AS NEEDED
Status: DISCONTINUED | OUTPATIENT
Start: 2020-09-24 | End: 2020-09-24 | Stop reason: HOSPADM

## 2020-09-24 RX ORDER — CEFAZOLIN SODIUM/WATER 2 G/20 ML
2 SYRINGE (ML) INTRAVENOUS ONCE
Status: COMPLETED | OUTPATIENT
Start: 2020-09-24 | End: 2020-09-24

## 2020-09-24 RX ORDER — ACETAMINOPHEN 500 MG
1000 TABLET ORAL ONCE
Status: COMPLETED | OUTPATIENT
Start: 2020-09-24 | End: 2020-09-24

## 2020-09-24 RX ORDER — MIDAZOLAM HYDROCHLORIDE 1 MG/ML
INJECTION, SOLUTION INTRAMUSCULAR; INTRAVENOUS AS NEEDED
Status: DISCONTINUED | OUTPATIENT
Start: 2020-09-24 | End: 2020-09-24 | Stop reason: HOSPADM

## 2020-09-24 RX ORDER — OXYCODONE AND ACETAMINOPHEN 5; 325 MG/1; MG/1
1 TABLET ORAL
Qty: 40 TAB | Refills: 0 | Status: SHIPPED | OUTPATIENT
Start: 2020-09-24 | End: 2020-10-08

## 2020-09-24 RX ORDER — PROPOFOL 10 MG/ML
INJECTION, EMULSION INTRAVENOUS AS NEEDED
Status: DISCONTINUED | OUTPATIENT
Start: 2020-09-24 | End: 2020-09-24 | Stop reason: HOSPADM

## 2020-09-24 RX ORDER — POTASSIUM CHLORIDE 7.45 MG/ML
10 INJECTION INTRAVENOUS ONCE
Status: DISCONTINUED | OUTPATIENT
Start: 2020-09-24 | End: 2020-09-24 | Stop reason: HOSPADM

## 2020-09-24 RX ORDER — NALOXONE HYDROCHLORIDE 0.4 MG/ML
0.1 INJECTION, SOLUTION INTRAMUSCULAR; INTRAVENOUS; SUBCUTANEOUS AS NEEDED
Status: DISCONTINUED | OUTPATIENT
Start: 2020-09-24 | End: 2020-09-24 | Stop reason: HOSPADM

## 2020-09-24 RX ORDER — POTASSIUM CHLORIDE 7.45 MG/ML
INJECTION INTRAVENOUS AS NEEDED
Status: DISCONTINUED | OUTPATIENT
Start: 2020-09-24 | End: 2020-09-24 | Stop reason: HOSPADM

## 2020-09-24 RX ORDER — FENTANYL CITRATE 50 UG/ML
INJECTION, SOLUTION INTRAMUSCULAR; INTRAVENOUS AS NEEDED
Status: DISCONTINUED | OUTPATIENT
Start: 2020-09-24 | End: 2020-09-24 | Stop reason: HOSPADM

## 2020-09-24 RX ORDER — FENTANYL CITRATE 50 UG/ML
25 INJECTION, SOLUTION INTRAMUSCULAR; INTRAVENOUS AS NEEDED
Status: DISCONTINUED | OUTPATIENT
Start: 2020-09-24 | End: 2020-09-24 | Stop reason: HOSPADM

## 2020-09-24 RX ORDER — KETOROLAC TROMETHAMINE 15 MG/ML
15 INJECTION, SOLUTION INTRAMUSCULAR; INTRAVENOUS
Status: DISCONTINUED | OUTPATIENT
Start: 2020-09-24 | End: 2020-09-24 | Stop reason: HOSPADM

## 2020-09-24 RX ADMIN — ROCURONIUM BROMIDE 50 MG: 10 INJECTION, SOLUTION INTRAVENOUS at 07:34

## 2020-09-24 RX ADMIN — LIDOCAINE HYDROCHLORIDE 100 MG: 20 INJECTION, SOLUTION EPIDURAL; INFILTRATION; INTRACAUDAL; PERINEURAL at 07:32

## 2020-09-24 RX ADMIN — ROCURONIUM BROMIDE 10 MG: 10 INJECTION, SOLUTION INTRAVENOUS at 08:15

## 2020-09-24 RX ADMIN — FENTANYL CITRATE 50 MCG: 50 INJECTION, SOLUTION INTRAMUSCULAR; INTRAVENOUS at 07:59

## 2020-09-24 RX ADMIN — ONDANSETRON HYDROCHLORIDE 4 MG: 2 INJECTION INTRAMUSCULAR; INTRAVENOUS at 07:28

## 2020-09-24 RX ADMIN — SODIUM CHLORIDE, SODIUM LACTATE, POTASSIUM CHLORIDE, AND CALCIUM CHLORIDE: 600; 310; 30; 20 INJECTION, SOLUTION INTRAVENOUS at 08:34

## 2020-09-24 RX ADMIN — SODIUM CHLORIDE, SODIUM LACTATE, POTASSIUM CHLORIDE, AND CALCIUM CHLORIDE 50 ML/HR: 600; 310; 30; 20 INJECTION, SOLUTION INTRAVENOUS at 06:40

## 2020-09-24 RX ADMIN — POTASSIUM CHLORIDE 10 MEQ: 10 INJECTION, SOLUTION INTRAVENOUS at 08:05

## 2020-09-24 RX ADMIN — FENTANYL CITRATE 50 MCG: 50 INJECTION, SOLUTION INTRAMUSCULAR; INTRAVENOUS at 08:07

## 2020-09-24 RX ADMIN — GLYCOPYRROLATE 0.4 MG: 0.2 INJECTION INTRAMUSCULAR; INTRAVENOUS at 08:59

## 2020-09-24 RX ADMIN — ONDANSETRON 4 MG: 2 INJECTION INTRAMUSCULAR; INTRAVENOUS at 10:25

## 2020-09-24 RX ADMIN — SODIUM CHLORIDE, SODIUM LACTATE, POTASSIUM CHLORIDE, AND CALCIUM CHLORIDE 125 ML/HR: 600; 310; 30; 20 INJECTION, SOLUTION INTRAVENOUS at 06:29

## 2020-09-24 RX ADMIN — PROPOFOL 150 MG: 10 INJECTION, EMULSION INTRAVENOUS at 07:33

## 2020-09-24 RX ADMIN — Medication 3 MG: at 08:59

## 2020-09-24 RX ADMIN — MIDAZOLAM 2 MG: 1 INJECTION INTRAMUSCULAR; INTRAVENOUS at 07:25

## 2020-09-24 RX ADMIN — SODIUM CHLORIDE, SODIUM LACTATE, POTASSIUM CHLORIDE, AND CALCIUM CHLORIDE 1000 ML: 600; 310; 30; 20 INJECTION, SOLUTION INTRAVENOUS at 06:29

## 2020-09-24 RX ADMIN — PROMETHAZINE HYDROCHLORIDE 12.5 MG: 25 INJECTION, SOLUTION INTRAMUSCULAR; INTRAVENOUS at 11:53

## 2020-09-24 RX ADMIN — Medication 2 G: at 07:39

## 2020-09-24 RX ADMIN — HEPARIN SODIUM 5000 UNITS: 5000 INJECTION INTRAVENOUS; SUBCUTANEOUS at 07:11

## 2020-09-24 RX ADMIN — PREGABALIN 25 MG: 25 CAPSULE ORAL at 06:46

## 2020-09-24 RX ADMIN — ACETAMINOPHEN 1000 MG: 500 TABLET ORAL at 06:46

## 2020-09-24 RX ADMIN — GLYCOPYRROLATE 0.2 MG: 0.2 INJECTION INTRAMUSCULAR; INTRAVENOUS at 07:28

## 2020-09-24 RX ADMIN — FENTANYL CITRATE 50 MCG: 50 INJECTION, SOLUTION INTRAMUSCULAR; INTRAVENOUS at 07:32

## 2020-09-24 RX ADMIN — KETAMINE HYDROCHLORIDE 50 MG: 10 INJECTION, SOLUTION INTRAMUSCULAR; INTRAVENOUS at 07:32

## 2020-09-24 RX ADMIN — CELECOXIB 200 MG: 100 CAPSULE ORAL at 06:46

## 2020-09-24 NOTE — OP NOTES
[facility]    Name:  Rivka Owens  MR#:   [id number]  :  1945  ACCOUNT #:  [de-identified]  DATE OF SERVICE:  2020    PREOPERATIVE DIAGNOSIS:  Grade I endometrial cancer. POSTOPERATIVE DIAGNOSIS:  Grade I endometrial cancer. PROCEDURE PERFORMED:  Robotic-assisted total laparoscopic hysterectomy, bilateral salpingo-oophorectomy, staging and washings. SURGEON:  Millicent Braga DO    ASSISTANT:  Pam Blank. ASSISTANT TASK:  Retraction, uterine manipulation and closing. ANESTHESIA:  General.    FLUIDS:  1700 mL. COMPLICATIONS:  None. SPECIMENS REMOVED:  Uterus, cervix, bilateral tubes and ovaries, right pelvic sentinel lymph node. IMPLANTS:  None. ESTIMATED BLOOD LOSS:  100 mL. URINE OUTPUT:  200 mL, clear urine. FINDINGS:  Laparoscopic findings revealed a slightly enlarged fibroid uterus with a right hydrosalpinx, normal ovaries. Frozen section consistent with superficial invasion, otherwise all peritoneal surfaces were smooth. INDICATIONS:  The patient is a 69-year-old who had pelvic pain, had an ultrasound revealing a thickened endometrium. She subsequently underwent a hysteroscopy and D and C with the finding of a grade I endometrial adenocarcinoma. She presents today for surgical evaluation. PROCEDURE:  The patient was taken to the operating room where anesthesia was obtained without difficulty. She was placed in dorsal lithotomy position, prepped and draped in normal sterile fashion. A surgical time-out was taken by the entire surgical team.  A Shah catheter placed. A sterile speculum was then placed in the patient's vagina. The anterior lip of the cervix was grasped with a single-tooth tenaculum. ICG dye was injected at both 3 and 9 o'clock, both deep and superficial.  The cervix was then dilated and an 8-cm JEANETTE-tip with a 3.0-cm Koh cup was advanced without difficulty.     Attention was then turned to the abdomen where a Veress needle was introduced in the left upper quadrant midclavicular line and pneumoperitoneum was obtained to 15 mmHg. An incision was then made above the umbilicus, and an 3.0-MA robotic trocar was advanced. Laparoscope was introduced. There was no evidence of injury from our entry. The patient was placed in Trendelenburg with findings above. At this point, an 8-mm robotic trocar was placed in left and right side of the abdomen under direct visualization and a 5-mm trocar was placed in the right lower quadrant. Washings were obtained. The robot was undocked at the console. Attention was turned to right side where the right round ligament was divided using monopolar cautery. This peritoneal incision was extended superiorly on the right IP ligament. The right retroperitoneal space was opened, and the ureter was identified. Firefly was activated and there was no obvious uptake at this time. Therefore, attention was turned to left side where this was repeated in the same fashion, and there was no uptake on the left at this point. Next, attention was turned to the bladder, and the anterior vesicouterine peritoneum was incised along the uterine segment and the bladder flap was created using both blunt and sharp dissection. Attention was then turned back to the right pelvic lymph nodes and Firefly was activated, and there was some uptake in the distal external iliac artery and vein lymph node which was dissected free from its attachment to the artery and vein and surrounding tissues, placed in an Endo Catch bag. Attention was turned back to the left side, and Firefly was activated. There was no obvious uptake. Therefore, at this point, the left IP ligament was skeletonized, sealed and divided and the right IP ligament was skeletonized, sealed and divided. The adnexa were elevated and the posteromedial leaf of the broad ligament was incised towards the Koh cup.   The uterine arteries were then skeletonized bilaterally, sealed and divided. The cardinal ligaments were sealed and divided. An anterior colpotomy was made, and this was carried circumferentially around the Koh cup, and the specimen was pulled through the vagina and sent for frozen consistent with superficial invasion. At this point, the vaginal cuff was reapproximated with a 2-0 PDS in a running fashion. The pelvis was irrigated copiously and there was good hemostasis. At this point, all instruments were removed, the robot was undocked, all trocars were removed and pneumoperitoneum was relieved. All skin sites were closed with 4-0 Monocryl, and Exparel was injected at the conclusion. The patient tolerated the procedure well. Sponge, needle, and instrument counts were correct x2, and the patient was taken to recovery room in stable condition.       [address], DO      [id number]  D:  09/24/2020 9:02  T:  09/24/2020 10:52  JOB #:  [id number]

## 2020-09-24 NOTE — ANESTHESIA PREPROCEDURE EVALUATION
Anesthetic History   No history of anesthetic complications     Pertinent negatives: No PONV       Review of Systems / Medical History  Patient summary reviewed, nursing notes reviewed and pertinent labs reviewed    Pulmonary              Pertinent negatives: No COPD, asthma and smoker  Comments: Pt states she has phlegm and secretions   Neuro/Psych   Within defined limits           Cardiovascular    Hypertension: well controlled            Pertinent negatives: No past MI and CAD  Exercise tolerance: >4 METS     GI/Hepatic/Renal  Within defined limits           Pertinent negatives: No GERD, liver disease and renal disease   Endo/Other    Diabetes: poorly controlled, type 2, using insulin    Morbid obesity     Other Findings   Comments: etoh neg         Physical Exam    Airway  Mallampati: II  TM Distance: 4 - 6 cm  Neck ROM: normal range of motion   Mouth opening: Normal     Cardiovascular               Dental    Dentition: Poor dentition     Pulmonary                 Abdominal  GI exam deferred       Other Findings            Anesthetic Plan    ASA: 3  Anesthesia type: general          Induction: Intravenous  Anesthetic plan and risks discussed with: Patient

## 2020-09-24 NOTE — PERIOP NOTES
TRANSFER - IN REPORT:    Verbal report received from ORN & CRNA on Darrel Noble  being received from OR (unit) for routine post - op      Report consisted of patients Situation, Background, Assessment and   Recommendations(SBAR). Information from the following report(s) SBAR was reviewed with the receiving nurse. Opportunity for questions and clarification was provided. Assessment completed upon patients arrival to unit and care assumed. Potassium IVPB is still infusing approx 20 ml in bag TBA.

## 2020-09-24 NOTE — PERIOP NOTES
Reviewed PTA medication list with patient/caregiver and patient/caregiver denies any additional medications. Patient admits to having a responsible adult care for them for at least 24 hours after surgery.     Dual skin assessment completed by Birgit Hamilton RN and Vicki Cain RN.

## 2020-09-24 NOTE — BRIEF OP NOTE
Brief Postoperative Note    Patient: Rene Oliva  YOB: 1945  MRN: [id number]    Date of Procedure: 9/24/2020     Pre-Op Diagnosis: ENDOMETRIAL CANCER    Post-Op Diagnosis: Same as preoperative diagnosis. Procedure(s):  ROBOTIC TOTAL LAPARSCOPIC HYSTERECTOMY, BILATERAL SALPINGO OOPHORECTOMY, STAGING    Surgeon(s):  Jack Juan MD    Surgical Assistant: None    Anesthesia: General     Estimated Blood Loss (mL): 262    Complications: None    Specimens:   ID Type Source Tests Collected by Time Destination   1 : CERVIX, UTEREUS, BILATERAL FALLOPIAN TUBES, BILATERAL OVARIES Frozen Section Uterus with Bilateral Fallopian tubes and Ovaries  Jack Juan MD 9/24/2020 0831 Pathology   2 : RIGHT PELVIC SENTINEL LYMPH NODE Preservative   Jack Juan MD 9/24/2020 4660 Pathology   1 : ABDOMINAL WASHINGS Fresh Abdomen  Jack Juan MD 9/24/2020 0802 Cytology        Implants: * No implants in log *    Drains: * No LDAs found *    Findings: slightly enlarged uterus. Hydrosalpinx left side. Normal ovaries.  Frozen: superficial invasion    Electronically Signed by Barrett Croft MD on 9/24/2020 at 8:51 AM

## 2020-09-24 NOTE — PERIOP NOTES
Discharge instructions given to patient and . AVS med list reviewed for duplicates. Opportunity for questions provided.

## 2020-09-24 NOTE — ANESTHESIA POSTPROCEDURE EVALUATION
Procedure(s):  ROBOTIC TOTAL LAPARSCOPIC HYSTERECTOMY, BILATERAL SALPINGO OOPHORECTOMY, STAGING. general    Anesthesia Post Evaluation        Comments: Post-Anesthesia Evaluation and Assessment    Cardiovascular Function/Vital Signs  BP (!) 143/64   Pulse (!) 55   Temp 36.1 °C (97 °F)   Resp 14   Ht 5' 5\" (1.651 m)   Wt 107.6 kg (237 lb 5 oz)   SpO2 97%   BMI 39.49 kg/m²     Patient is status post Procedure(s):  ROBOTIC TOTAL LAPARSCOPIC HYSTERECTOMY, BILATERAL SALPINGO OOPHORECTOMY, STAGING. Nausea/Vomiting: Controlled. Postoperative hydration reviewed and adequate. Pain:  Pain Scale 1: Visual (09/24/20 1002)  Pain Intensity 1: 0 (09/24/20 1002)   Managed. Neurological Status:   Neuro (WDL): Within Defined Limits (09/24/20 1002)   At baseline. Mental Status and Level of Consciousness: Arousable. Pulmonary Status:   O2 Device: Nasal cannula (09/24/20 1002)   Adequate oxygenation and airway patent. Complications related to anesthesia: None    Post-anesthesia assessment completed. No concerns. Patient has met all discharge requirements. Signed By: Joann Painting MD    September 24, 2020                     INITIAL Post-op Vital signs:   Vitals Value Taken Time   /67 9/24/2020 10:10 AM   Temp 36.1 °C (97 °F) 9/24/2020 10:02 AM   Pulse 55 9/24/2020 10:13 AM   Resp 18 9/24/2020 10:13 AM   SpO2 97 % 9/24/2020 10:13 AM   Vitals shown include unvalidated device data.

## 2020-09-28 ENCOUNTER — VIRTUAL VISIT (OUTPATIENT)
Dept: ONCOLOGY | Age: 75
End: 2020-09-28

## 2020-09-28 DIAGNOSIS — Z48.89 ENCOUNTER FOR POSTOPERATIVE WOUND CARE: Primary | ICD-10-CM

## 2020-09-28 NOTE — PROGRESS NOTES
[address], [address], [address] [facility]  [address], [address], [address] [place], [place] [id number] [phone]  [facility]        Postoperative Office Note  Patient ID:  Name: Rick Leonard  MRN: [id number]  : 1945 66 y.o. Date: 2020      SUBJECTIVE:    This is a 66 y.o.  female who presents s/p Robotic-assisted total laparoscopic hysterectomy, bilateral salpingo-oophorectomy, staging and washings on 20. Currently she has no problems with eating, bowel movements, voiding, or their wound. Appetite is good. Eating a regular diet without difficulty. Urinating without difficulty. Bowel movements are regular. Pain is controlled without any medications. Seen today for blistered areas on abdomen, as well as slight oozing from right trochar site. Denies fever, n/v or other complaints. Her pathology revealed    A: UTERUS, CERVIX, BILATERAL FALLOPIAN TUBES AND OVARIES, HYSTERECTOMY AND BILATERAL SALPINGO-OOPHORECTOMY:   ENDOMETRIOID CARCINOMA, FIGO GRADE 1.   UTERUS WITH ADENOMYOSIS AND LEIOMYOMATA. CERVIX WITH NO PATHOLOGIC CHANGE. BILATERAL FALLOPIAN TUBES WITH NO PATHOLOGIC CHANGE. BILATERAL OVARIES WITH NO PATHOLOGIC CHANGE.   B: RIGHT PELVIC SENTINEL LYMPH NODE, EXCISION:   ONE LYMPH NODE NEGATIVE FOR CARCINOMA (0/1). ENDOMETRIUM   SPECIMEN   Procedure:  Total hysterectomy and bilateral salpingo-oophorectomy   Hysterectomy Type: Laparoscopic, robotic-assisted   Specimen Integrity: Intact   TUMOR   Tumor Site: Endometrium   Histologic Type: Endometrioid carcinoma, NOS   Histologic Grade: FIGO grade 1   Myometrial Invasion: Present   Depth of Invasion (Millimeters): 3 mm   Myometrial Thickness (Millimeters): 18 mm   Percentage of Myometrial Invasion: 15 %   Adenomyosis: Present, uninvolved by carcinoma   Uterine Serosa Involvement: Not identified   Lower Uterine Segment Involvement: Not identified   Cervical Stromal Involvement: Not identified   Other Tissue / Organ Involvement: Not applicable   Lymphovascular Invasion: Not identified   MARGINS   LYMPH NODES   Lymph Node Status: All lymph nodes negative for tumor cells   Total Number of Pelvic Nodes Examined: 1   Number of Pelvic [place] Nodes Examined: 1   PATHOLOGIC STAGE CLASSIFICATION (pTNM, AJCC 8th Edition)   Primary Tumor (pT): pT1a   Regional Lymph Nodes (pN)   Category (pN): pN0     WASHINGS, PERITONEAL   SATISFACTORY FOR EVALUATION. NO MALIGNANT CELLS FOUND. Medications:  Current Outpatient Medications on File Prior to Visit   Medication Sig Dispense Refill    oxyCODONE-acetaminophen (Percocet) 5-325 mg per tablet Take 1 Tab by mouth every four (4) hours as needed for Pain for up to 14 days. Max Daily Amount: 6 Tabs. 40 Tab 0    albuterol (PROVENTIL HFA, VENTOLIN HFA, PROAIR HFA) 90 mcg/actuation inhaler Take 1 Puff by inhalation every four (4) hours as needed for Wheezing.  ferrous sulfate (Iron) 325 mg (65 mg iron) tablet Take  by mouth Daily (before breakfast).  colestipoL (COLESTID) 1 gram tablet Take 1 g by mouth two (2) times a day. Indications: high cholesterol      furosemide (LASIX) 20 mg tablet Take 20 mg by mouth as needed.  LANTUS SOLOSTAR U-100 INSULIN 100 unit/mL (3 mL) inpn 20 Units by SubCUTAneous route nightly.  NOVOLOG FLEXPEN U-100 INSULIN 100 unit/mL inpn 1-6 Units by SubCUTAneous route Before breakfast, lunch, and dinner.  amLODIPine-benazepril (LOTREL) 10-20 mg per capsule Take 1 Cap by mouth daily.  aspirin delayed-release 81 mg tablet Take 81 mg by mouth daily. No current facility-administered medications on file prior to visit. Allergies: Allergies   Allergen Reactions    Metformin Diarrhea    Other Medication Myalgia     Cholesterol medication (unsure of name of medication)           OBJECTIVE:    Vitals:  There were no vitals taken for this visit. Physical Examination:    General:  alert, cooperative, no distress   Abdomen: soft, bowel sounds active, non-tender   Incision: healing well, reinforced with steri strips. Two small blisters above upper right trochar site. Pelvic: deferred   Rectal: not done   Extremity:   extremities normal, atraumatic, no cyanosis or edema     IMPRESSION/PLAN:    Citlaly Kennedy is doing well postoperatively. She has a working diagnosis of Grade 1 endometrial cancer. The operative procedures and clinical results have been reviewed with the patient. Reviewed wound care recommendations. I will see the patient back for routine postop appointments. The patient is advised to call our office with any problems or concerns.     [facility]  Gynecologic Oncology  9/28/2020 4:16 PM

## 2020-09-28 NOTE — PROGRESS NOTES
Dina Paniagua is a 66 y.o. female presents as a virtual visit for patient concern    Patient contacted office to schedule 2 week post surgical visit. Patient concerned with fluid filled blister on incision site. Patient states blisters are sore to the touch.            3 most recent PHQ Screens 9/9/2020   Little interest or pleasure in doing things Not at all   Feeling down, depressed, irritable, or hopeless Not at all   Total Score PHQ 2 0

## 2020-10-15 ENCOUNTER — TELEPHONE (OUTPATIENT)
Dept: ONCOLOGY | Age: 75
End: 2020-10-15

## 2020-10-20 ENCOUNTER — OFFICE VISIT (OUTPATIENT)
Dept: ONCOLOGY | Age: 75
End: 2020-10-20
Payer: MEDICARE

## 2020-10-20 VITALS
OXYGEN SATURATION: 96 % | WEIGHT: 234 LBS | HEART RATE: 103 BPM | HEIGHT: 65 IN | BODY MASS INDEX: 38.99 KG/M2 | SYSTOLIC BLOOD PRESSURE: 133 MMHG | DIASTOLIC BLOOD PRESSURE: 68 MMHG | TEMPERATURE: 97.5 F | RESPIRATION RATE: 14 BRPM

## 2020-10-20 DIAGNOSIS — C54.1 ENDOMETRIAL CANCER (HCC): Primary | ICD-10-CM

## 2020-10-20 PROCEDURE — 99024 POSTOP FOLLOW-UP VISIT: CPT | Performed by: OBSTETRICS & GYNECOLOGY

## 2020-10-20 NOTE — PROGRESS NOTES
Osmani Guillory is a 66 y.o. female presents in office for post op visit, accompanied by her . Chief Complaint   Patient presents with    Surgical Follow-up        Do you have any unusual vaginal bleeding, discharge or irritation? Pt c/o discharge with wiping. Do you have any changes in your bowel movements? No  Have you been experiencing nausea or vomiting? No  Have you been experiencing any continuous or worsening abdominal pain? Pt c/o right sided lower abdominal pain when she lifts that leg. Any urinary burning? No    Visit Vitals  /68 (BP 1 Location: Left arm, BP Patient Position: Sitting)   Pulse (!) 103   Temp 97.5 °F (36.4 °C) (Oral)   Resp 14   Ht 5' 5\" (1.651 m)   Wt 106.1 kg (234 lb)   SpO2 96%   BMI 38.94 kg/m²         1. Have you been to the ER, urgent care clinic since your last visit? Hospitalized since your last visit? No    2. Have you seen or consulted any other health care providers outside of the [address] [place] [address] since your last visit? Include any pap smears or colon screening.  No    3 most recent PHQ Screens 9/9/2020   Little interest or pleasure in doing things Not at all   Feeling down, depressed, irritable, or hopeless Not at all   Total Score PHQ 2 0       Learning Assessment 10/20/2020   PRIMARY LEARNER Patient   BARRIERS PRIMARY LEARNER NONE   CO-LEARNER CAREGIVER No   PRIMARY LANGUAGE ENGLISH   LEARNER PREFERENCE PRIMARY DEMONSTRATION   ANSWERED BY Patient   RELATIONSHIP SELF

## 2020-10-20 NOTE — LETTER
10/20/20 Patient: Jag Bernstein YOB: 1945 Date of Visit: 10/20/2020 Katie Michel MD 
[address] [place] [id number] [place] [address] VIA Facsimile: [phone] Dear Katie Michel MD, Thank you for referring Ms. Jag Bernstein to [facility] for evaluation. My notes for this consultation are attached. If you have questions, please do not hesitate to call me. I look forward to following your patient along with you. Sincerely, Alvaro Martinez MD

## 2020-10-20 NOTE — PROGRESS NOTES
[facility]  [address] [facility], [address], [address] [facility]  [address] [facility], [address] [place] [address]  [place], [address]   [phone]  Hoa Mary DO      Postoperative Office Note  Patient ID:  Name: Syd Vidales  MRM: [id number]  : 1945/75 y.o. Date: 10/20/2020    SUBJECTIVE:    This is a 66 y.o.  female who presents s/p Robotic-assisted total laparoscopic hysterectomy, bilateral salpingo-oophorectomy, staging and washings. on 2020  Currently she has no problems with eating, bowel movements, voiding, or their wound  Appetite is good. Eating a regular diet without difficulty. Urinating without difficulty. Bowel movements are regular. The patient is not having any pain. .    Her pathology revealed      A: UTERUS, CERVIX, BILATERAL FALLOPIAN TUBES AND OVARIES, HYSTERECTOMY AND BILATERAL SALPINGO-OOPHORECTOMY:   ENDOMETRIOID CARCINOMA, FIGO GRADE 1.   UTERUS WITH ADENOMYOSIS AND LEIOMYOMATA. CERVIX WITH NO PATHOLOGIC CHANGE. BILATERAL FALLOPIAN TUBES WITH NO PATHOLOGIC CHANGE. BILATERAL OVARIES WITH NO PATHOLOGIC CHANGE.   B: RIGHT PELVIC SENTINEL LYMPH NODE, EXCISION:   ONE LYMPH NODE NEGATIVE FOR CARCINOMA (0/1). ENDOMETRIUM   SPECIMEN   Procedure:  Total hysterectomy and bilateral salpingo-oophorectomy   Hysterectomy Type: Laparoscopic, robotic-assisted   Specimen Integrity: Intact   TUMOR   Tumor Site: Endometrium   Histologic Type: Endometrioid carcinoma, NOS   Histologic Grade: FIGO grade 1   Myometrial Invasion: Present   Depth of Invasion (Millimeters): 3 mm   Myometrial Thickness (Millimeters): 18 mm   Percentage of Myometrial Invasion: 15 %   Adenomyosis: Present, uninvolved by carcinoma   Uterine Serosa Involvement: Not identified   Lower Uterine Segment Involvement: Not identified   Cervical Stromal Involvement: Not identified   Other Tissue / Organ Involvement: Not applicable   Lymphovascular Invasion: Not identified   MARGINS   LYMPH NODES   Lymph Node Status: All lymph nodes negative for tumor cells   Total Number of Pelvic Nodes Examined: 1   Number of Pelvic [place] Nodes Examined: 1   PATHOLOGIC STAGE CLASSIFICATION (pTNM, AJCC 8th Edition)   Primary Tumor (pT): pT1a   Regional Lymph Nodes (pN)   Category (pN): p       Medications:     Current Outpatient Medications on File Prior to Visit   Medication Sig Dispense Refill    albuterol (PROVENTIL HFA, VENTOLIN HFA, PROAIR HFA) 90 mcg/actuation inhaler Take 1 Puff by inhalation every four (4) hours as needed for Wheezing.  ferrous sulfate (Iron) 325 mg (65 mg iron) tablet Take  by mouth Daily (before breakfast).  colestipoL (COLESTID) 1 gram tablet Take 1 g by mouth two (2) times a day. Indications: high cholesterol      LANTUS SOLOSTAR U-100 INSULIN 100 unit/mL (3 mL) inpn 20 Units by SubCUTAneous route nightly.  NOVOLOG FLEXPEN U-100 INSULIN 100 unit/mL inpn 1-6 Units by SubCUTAneous route Before breakfast, lunch, and dinner.  amLODIPine-benazepril (LOTREL) 10-20 mg per capsule Take 1 Cap by mouth daily.  aspirin delayed-release 81 mg tablet Take 81 mg by mouth daily.  furosemide (LASIX) 20 mg tablet Take 20 mg by mouth as needed. No current facility-administered medications on file prior to visit. Allergies:      Allergies   Allergen Reactions    Metformin Diarrhea    Other Medication Myalgia     Cholesterol medication (unsure of name of medication)       OBJECTIVE:    Vitals:   Visit Vitals  /68 (BP 1 Location: Left arm, BP Patient Position: Sitting)   Pulse (!) 103   Temp 97.5 °F (36.4 °C) (Oral)   Resp 14   Ht 5' 5\" (1.651 m)   Wt 106.1 kg (234 lb)   SpO2 96%   BMI 38.94 kg/m²       Physical Examination:    General:  alert, cooperative, no distress   Abdomen: soft, bowel sounds active, non-tender   Incision: healing well   Pelvic: NEFG, Spec exam revealed vaginal cuff intact, BME revealed vaginal cuff intact, nontender   Rectal: not done   Extremity:   extremities normal, atraumatic, no cyanosis or edema     IMPRESSION/PLAN:    Kimberli Kahn is Doing well postoperatively. .  She has a working diagnosis of Stage IAG1 endometrial cancer   -reviewed her pathology and favorable prognosis with low risk of recurrence and no need for adjuvant treatment   -reviewed surveillance strategy including exams every 4 months x 2 years, then every 6 months x 3 years then annually   -discussed s/sx of recurrence   -all of patients questions and concerns address   -will plan for f/u in 4 months    [address] [place] [address]  Gynecologic Oncology  10/20/2020/3:13 PM

## 2021-02-10 ENCOUNTER — OFFICE VISIT (OUTPATIENT)
Dept: ONCOLOGY | Age: 76
End: 2021-02-10
Payer: MEDICARE

## 2021-02-10 VITALS
SYSTOLIC BLOOD PRESSURE: 136 MMHG | OXYGEN SATURATION: 97 % | WEIGHT: 235 LBS | TEMPERATURE: 97.2 F | DIASTOLIC BLOOD PRESSURE: 82 MMHG | HEART RATE: 74 BPM | BODY MASS INDEX: 39.15 KG/M2 | HEIGHT: 65 IN

## 2021-02-10 DIAGNOSIS — C54.1 ENDOMETRIAL CANCER (HCC): Primary | ICD-10-CM

## 2021-02-10 PROCEDURE — 1101F PT FALLS ASSESS-DOCD LE1/YR: CPT | Performed by: OBSTETRICS & GYNECOLOGY

## 2021-02-10 PROCEDURE — G8427 DOCREV CUR MEDS BY ELIG CLIN: HCPCS | Performed by: OBSTETRICS & GYNECOLOGY

## 2021-02-10 PROCEDURE — G8400 PT W/DXA NO RESULTS DOC: HCPCS | Performed by: OBSTETRICS & GYNECOLOGY

## 2021-02-10 PROCEDURE — G0463 HOSPITAL OUTPT CLINIC VISIT: HCPCS | Performed by: OBSTETRICS & GYNECOLOGY

## 2021-02-10 PROCEDURE — G8510 SCR DEP NEG, NO PLAN REQD: HCPCS | Performed by: OBSTETRICS & GYNECOLOGY

## 2021-02-10 PROCEDURE — G8417 CALC BMI ABV UP PARAM F/U: HCPCS | Performed by: OBSTETRICS & GYNECOLOGY

## 2021-02-10 PROCEDURE — G8536 NO DOC ELDER MAL SCRN: HCPCS | Performed by: OBSTETRICS & GYNECOLOGY

## 2021-02-10 PROCEDURE — 1090F PRES/ABSN URINE INCON ASSESS: CPT | Performed by: OBSTETRICS & GYNECOLOGY

## 2021-02-10 PROCEDURE — 99213 OFFICE O/P EST LOW 20 MIN: CPT | Performed by: OBSTETRICS & GYNECOLOGY

## 2021-02-10 NOTE — PROGRESS NOTES
[facility]  [address] [place] [address], [address], [address] [facility]  [address] [facility], [address] [place] [address]  [place], [address]   [phone]  Andrea Da Silva         Patient ID:  Name: Lesly Abdi  MRM: [id number]  :  y.o. Date: 2/10/2021    SUBJECTIVE:    This is a 66 y.o.  female with h/o Stage IAG1 endometrial cancer s/p Robotic-assisted total laparoscopic hysterectomy, bilateral salpingo-oophorectomy, staging and washings. on 2020  Here for f/u has intermittent rlq pain. Currently, Denies Vaginal bleeding, change in vaginal discharge, change in bladder/bowel habits    Her pathology revealed      A: UTERUS, CERVIX, BILATERAL FALLOPIAN TUBES AND OVARIES, HYSTERECTOMY AND BILATERAL SALPINGO-OOPHORECTOMY:   ENDOMETRIOID CARCINOMA, FIGO GRADE 1.   UTERUS WITH ADENOMYOSIS AND LEIOMYOMATA. CERVIX WITH NO PATHOLOGIC CHANGE. BILATERAL FALLOPIAN TUBES WITH NO PATHOLOGIC CHANGE. BILATERAL OVARIES WITH NO PATHOLOGIC CHANGE.   B: RIGHT PELVIC SENTINEL LYMPH NODE, EXCISION:   ONE LYMPH NODE NEGATIVE FOR CARCINOMA (0/1). ENDOMETRIUM   SPECIMEN   Procedure: Total hysterectomy and bilateral salpingo-oophorectomy   Hysterectomy Type: Laparoscopic, robotic-assisted   Specimen Integrity: Intact   TUMOR   Tumor Site: Endometrium   Histologic Type: Endometrioid carcinoma, NOS   Histologic Grade: FIGO grade 1   Myometrial Invasion: Present   Depth of Invasion (Millimeters): 3 mm   Myometrial Thickness (Millimeters): 18 mm   Percentage of Myometrial Invasion: 15 %   Adenomyosis: Present, uninvolved by carcinoma   Uterine Serosa Involvement: Not identified   Lower Uterine Segment Involvement: Not identified   Cervical Stromal Involvement: Not identified   Other Tissue / Organ Involvement: Not applicable   Lymphovascular Invasion: Not identified   MARGINS   LYMPH NODES   Lymph Node Status:  All lymph nodes negative for tumor cells   Total Number of Pelvic Nodes Examined: 1   Number of Pelvic [place] Nodes Examined: 1   PATHOLOGIC STAGE CLASSIFICATION (pTNM, AJCC 8th Edition)   Primary Tumor (pT): pT1a   Regional Lymph Nodes (pN)   Category (pN): p       Medications:     Current Outpatient Medications on File Prior to Visit   Medication Sig Dispense Refill    albuterol (PROVENTIL HFA, VENTOLIN HFA, PROAIR HFA) 90 mcg/actuation inhaler Take 1 Puff by inhalation every four (4) hours as needed for Wheezing.  ferrous sulfate (Iron) 325 mg (65 mg iron) tablet Take  by mouth Daily (before breakfast).  furosemide (LASIX) 20 mg tablet Take 20 mg by mouth as needed.  LANTUS SOLOSTAR U-100 INSULIN 100 unit/mL (3 mL) inpn 20 Units by SubCUTAneous route nightly.  NOVOLOG FLEXPEN U-100 INSULIN 100 unit/mL inpn 1-6 Units by SubCUTAneous route Before breakfast, lunch, and dinner.  amLODIPine-benazepril (LOTREL) 10-20 mg per capsule Take 1 Cap by mouth daily.  aspirin delayed-release 81 mg tablet Take 81 mg by mouth daily.  colestipoL (COLESTID) 1 gram tablet Take 1 g by mouth two (2) times a day. Indications: high cholesterol       No current facility-administered medications on file prior to visit. Allergies:      Allergies   Allergen Reactions    Metformin Diarrhea    Other Medication Myalgia     Cholesterol medication (unsure of name of medication)       OBJECTIVE:    Vitals:   Visit Vitals  /82 (BP 1 Location: Left upper arm, BP Patient Position: Sitting)   Pulse 74   Temp 97.2 °F (36.2 °C) (Oral)   Ht 5' 5\" (1.651 m)   Wt 106.6 kg (235 lb)   SpO2 97%   BMI 39.11 kg/m²       Physical Examination:    General:  alert, cooperative, no distress   Abdomen: soft, bowel sounds active, non-tender   Incision: healing well   Pelvic: NEFG, Spec exam revealed atrophic mucosa, no mass BME revealed no masses, nontender   Rectal: not done   Extremity:   extremities normal, atraumatic, no cyanosis or edema IMPRESSION/PLAN:     Stage IAG1 endometrial cancer   -previously reviewed her pathology and favorable prognosis with low risk of recurrence and no need for adjuvant treatment   -reviewed surveillance strategy including exams every 4 months x 2 years, then every 6 months x 3 years then annually   -discussed s/sx of recurrence   -pelvic exam performed today   -all of patients questions and concerns address   -will plan for f/u in 4 months    Total time spent was 25 minutes regarding diagnosis of endometrial cancer and >50% of this time was spent counseling and coordinating care.     Fatou Wu DO  Gynecologic Oncology  2/10/2021/3:13 PM

## 2021-02-10 NOTE — PROGRESS NOTES
Cabrera Recinos is a 66 y.o. female presents in office for endometrial cancer surveillance accompanied by her . No chief complaint on file. Do you have any unusual vaginal bleeding, discharge or irritation? no.  Do you have any changes in your bowel movements? No  Have you been experiencing nausea or vomiting? No  Have you been experiencing any continuous or worsening abdominal pain? Pt c/o right sided lower abdominal pain intermittently  Any urinary burning? No    Visit Vitals  /82 (BP 1 Location: Left upper arm, BP Patient Position: Sitting)   Pulse 74   Temp 97.2 °F (36.2 °C) (Oral)   Ht 5' 5\" (1.651 m)   Wt 235 lb (106.6 kg)   SpO2 97%   BMI 39.11 kg/m²         1. Have you been to the ER, urgent care clinic since your last visit? Hospitalized since your last visit? No    2. Have you seen or consulted any other health care providers outside of the [address] [place] [address] since your last visit? Include any pap smears or colon screening.  No    3 most recent PHQ Screens 2/10/2021   Little interest or pleasure in doing things Not at all   Feeling down, depressed, irritable, or hopeless Not at all   Total Score PHQ 2 0       Learning Assessment 10/20/2020   PRIMARY LEARNER Patient   BARRIERS PRIMARY LEARNER NONE   CO-LEARNER CAREGIVER No   PRIMARY LANGUAGE ENGLISH   LEARNER PREFERENCE PRIMARY DEMONSTRATION   ANSWERED BY Patient   RELATIONSHIP SELF

## 2021-02-10 NOTE — LETTER
2/10/2021 Patient: Juan Manuel Acevedo YOB: 1945 Date of Visit: 2/10/2021 Wero Thomas MD 
[address] [facility], [facility] [address] [address] [address] [address] Via Fax: [phone] Dear Wero Thomas MD, Thank you for referring Ms. Juan Manuel Acevedo to [facility] for evaluation. My notes for this consultation are attached. If you have questions, please do not hesitate to call me. I look forward to following your patient along with you. Sincerely, Vinnie Jacinto MD

## 2021-06-23 ENCOUNTER — OFFICE VISIT (OUTPATIENT)
Dept: ONCOLOGY | Age: 76
End: 2021-06-23
Payer: MEDICARE

## 2021-06-23 VITALS
OXYGEN SATURATION: 96 % | BODY MASS INDEX: 39.49 KG/M2 | HEART RATE: 82 BPM | RESPIRATION RATE: 16 BRPM | DIASTOLIC BLOOD PRESSURE: 62 MMHG | WEIGHT: 237 LBS | TEMPERATURE: 97.1 F | HEIGHT: 65 IN | SYSTOLIC BLOOD PRESSURE: 120 MMHG

## 2021-06-23 DIAGNOSIS — C54.1 ENDOMETRIAL CANCER (HCC): Primary | ICD-10-CM

## 2021-06-23 PROCEDURE — G8417 CALC BMI ABV UP PARAM F/U: HCPCS | Performed by: OBSTETRICS & GYNECOLOGY

## 2021-06-23 PROCEDURE — G8536 NO DOC ELDER MAL SCRN: HCPCS | Performed by: OBSTETRICS & GYNECOLOGY

## 2021-06-23 PROCEDURE — 1101F PT FALLS ASSESS-DOCD LE1/YR: CPT | Performed by: OBSTETRICS & GYNECOLOGY

## 2021-06-23 PROCEDURE — G8432 DEP SCR NOT DOC, RNG: HCPCS | Performed by: OBSTETRICS & GYNECOLOGY

## 2021-06-23 PROCEDURE — G8427 DOCREV CUR MEDS BY ELIG CLIN: HCPCS | Performed by: OBSTETRICS & GYNECOLOGY

## 2021-06-23 PROCEDURE — 1090F PRES/ABSN URINE INCON ASSESS: CPT | Performed by: OBSTETRICS & GYNECOLOGY

## 2021-06-23 PROCEDURE — G8400 PT W/DXA NO RESULTS DOC: HCPCS | Performed by: OBSTETRICS & GYNECOLOGY

## 2021-06-23 PROCEDURE — G0463 HOSPITAL OUTPT CLINIC VISIT: HCPCS | Performed by: OBSTETRICS & GYNECOLOGY

## 2021-06-23 PROCEDURE — 99213 OFFICE O/P EST LOW 20 MIN: CPT | Performed by: OBSTETRICS & GYNECOLOGY

## 2021-06-23 RX ORDER — MONTELUKAST SODIUM 10 MG/1
TABLET ORAL
COMMUNITY
Start: 2021-06-03

## 2021-06-23 RX ORDER — IPRATROPIUM BROMIDE AND ALBUTEROL SULFATE 2.5; .5 MG/3ML; MG/3ML
3 SOLUTION RESPIRATORY (INHALATION) 4 TIMES DAILY
COMMUNITY
Start: 2021-01-05

## 2021-06-23 NOTE — LETTER
6/23/2021    Patient: Alan Rodriguez   YOB: 1945   Date of Visit: 6/23/2021     Nilesh Vickers MD  [address]  Via Fax: [phone]    Dear Nilesh Vickers MD,      Thank you for referring Ms. Alan Rodriguez to [address] [place] [address] SPECIALIST AT [address] [place] [address] for evaluation. My notes for this consultation are attached. If you have questions, please do not hesitate to call me. I look forward to following your patient along with you.       Sincerely,    Mark Burgess MD

## 2021-06-23 NOTE — PROGRESS NOTES
Cherrie Gaucher is a 64 y.o. female presents in office for endometrial cancer surveillance accompanied by her . Chief Complaint   Patient presents with    Uterine Cancer      Pt had blood clots, currently resolved. Do you have any unusual vaginal bleeding, discharge or irritation? no.  Do you have any changes in your bowel movements? No  Have you been experiencing nausea or vomiting? No  Have you been experiencing any continuous or worsening abdominal pain? No  Any urinary burning? Pt c/o frequent urination. Visit Vitals  /62 (BP 1 Location: Left arm, BP Patient Position: Sitting)   Pulse 82   Temp 97.1 °F (36.2 °C) (Oral)   Resp 16   Ht 5' 5\" (1.651 m)   Wt 107.5 kg (237 lb)   SpO2 96%   BMI 39.44 kg/m²         1. Have you been to the ER, urgent care clinic since your last visit? Hospitalized since your last visit? No    2. Have you seen or consulted any other health care providers outside of the [address] [place] [address] since your last visit? Include any pap smears or colon screening.  No    3 most recent PHQ Screens 2/10/2021   Little interest or pleasure in doing things Not at all   Feeling down, depressed, irritable, or hopeless Not at all   Total Score PHQ 2 0       Learning Assessment 10/20/2020   PRIMARY LEARNER Patient   BARRIERS PRIMARY LEARNER NONE   CO-LEARNER CAREGIVER No   PRIMARY LANGUAGE ENGLISH   LEARNER PREFERENCE PRIMARY DEMONSTRATION   ANSWERED BY Patient   RELATIONSHIP SELF

## 2021-06-23 NOTE — PROGRESS NOTES
[facility]  [address] [place] [address], [address], [address] [facility]  [address] [facility], [address] [facility] [place]  [place], [address]   [phone]  General Sine DO        Patient ID:  Name: Alec Jacobs  MRM: [id number]  :  y.o. Date: 2021    SUBJECTIVE:    This is a 64 y.o.  female with h/o Stage IAG1 endometrial cancer s/p Robotic-assisted total laparoscopic hysterectomy, bilateral salpingo-oophorectomy, staging and washings. on 2020  Here for f/u. Currently, Denies Vaginal bleeding, change in vaginal discharge, change in bladder/bowel habits. Was diagnosed with DVT but had f/u with vascular and they felt it resolved so was taken off blood thinner. Her pathology revealed      A: UTERUS, CERVIX, BILATERAL FALLOPIAN TUBES AND OVARIES, HYSTERECTOMY AND BILATERAL SALPINGO-OOPHORECTOMY:   ENDOMETRIOID CARCINOMA, FIGO GRADE 1.   UTERUS WITH ADENOMYOSIS AND LEIOMYOMATA. CERVIX WITH NO PATHOLOGIC CHANGE. BILATERAL FALLOPIAN TUBES WITH NO PATHOLOGIC CHANGE. BILATERAL OVARIES WITH NO PATHOLOGIC CHANGE.   B: RIGHT PELVIC SENTINEL LYMPH NODE, EXCISION:   ONE LYMPH NODE NEGATIVE FOR CARCINOMA (0/1). ENDOMETRIUM   SPECIMEN   Procedure:  Total hysterectomy and bilateral salpingo-oophorectomy   Hysterectomy Type: Laparoscopic, robotic-assisted   Specimen Integrity: Intact   TUMOR   Tumor Site: Endometrium   Histologic Type: Endometrioid carcinoma, NOS   Histologic Grade: FIGO grade 1   Myometrial Invasion: Present   Depth of Invasion (Millimeters): 3 mm   Myometrial Thickness (Millimeters): 18 mm   Percentage of Myometrial Invasion: 15 %   Adenomyosis: Present, uninvolved by carcinoma   Uterine Serosa Involvement: Not identified   Lower Uterine Segment Involvement: Not identified   Cervical Stromal Involvement: Not identified   Other Tissue / Organ Involvement: Not applicable   Lymphovascular Invasion: Not identified MARGINS   LYMPH NODES   Lymph Node Status: All lymph nodes negative for tumor cells   Total Number of Pelvic Nodes Examined: 1   Number of Pelvic [place] Nodes Examined: 1   PATHOLOGIC STAGE CLASSIFICATION (pTNM, AJCC 8th Edition)   Primary Tumor (pT): pT1a   Regional Lymph Nodes (pN)   Category (pN): p       Medications:     Current Outpatient Medications on File Prior to Visit   Medication Sig Dispense Refill    albuterol-ipratropium (DUO-NEB) 2.5 mg-0.5 mg/3 ml nebu Take 3 mL by inhalation four (4) times daily.  montelukast (SINGULAIR) 10 mg tablet TAKE 1 TABLET BY MOUTH NIGHTLY AS NEEDED FOR WHEEZING OR ALLERGIES      albuterol (PROVENTIL HFA, VENTOLIN HFA, PROAIR HFA) 90 mcg/actuation inhaler Take 1 Puff by inhalation every four (4) hours as needed for Wheezing.  ferrous sulfate (Iron) 325 mg (65 mg iron) tablet Take  by mouth Daily (before breakfast).  furosemide (LASIX) 20 mg tablet Take 20 mg by mouth as needed.  LANTUS SOLOSTAR U-100 INSULIN 100 unit/mL (3 mL) inpn 20 Units by SubCUTAneous route nightly.  NOVOLOG FLEXPEN U-100 INSULIN 100 unit/mL inpn 1-6 Units by SubCUTAneous route Before breakfast, lunch, and dinner.  amLODIPine-benazepril (LOTREL) 10-20 mg per capsule Take 1 Cap by mouth daily.  aspirin delayed-release 81 mg tablet Take 81 mg by mouth daily.  colestipoL (COLESTID) 1 gram tablet Take 1 g by mouth two (2) times a day. Indications: high cholesterol (Patient not taking: Reported on 6/23/2021)       No current facility-administered medications on file prior to visit. Allergies:      Allergies   Allergen Reactions    Metformin Diarrhea    Other Medication Myalgia     Cholesterol medication (unsure of name of medication)       OBJECTIVE:    Vitals:   Visit Vitals  /62 (BP 1 Location: Left arm, BP Patient Position: Sitting)   Pulse 82   Temp 97.1 °F (36.2 °C) (Oral)   Resp 16   Ht 5' 5\" (1.651 m)   Wt 107.5 kg (237 lb)   SpO2 96%   BMI 39.44 kg/m²       Physical Examination:    General:  alert, cooperative, no distress   Abdomen: soft, bowel sounds active, non-tender   Incision: healing well   Pelvic: NEFG, Spec exam revealed atrophic mucosa, no mass BME revealed no masses, nontender   Rectal: not done   Extremity:   extremities normal, atraumatic, no cyanosis or edema     IMPRESSION/PLAN:     Stage IAG1 endometrial cancer   -previously reviewed her pathology and favorable prognosis with low risk of recurrence and no need for adjuvant treatment   -reviewed surveillance strategy including exams every 4 months x 2 years, then every 6 months x 3 years then annually   -discussed s/sx of recurrence   -pelvic exam performed today   -all of patients questions and concerns address   -will plan for f/u in 4 months    Total time spent was 25 minutes regarding diagnosis of endometrial cancer and >50% of this time was spent counseling and coordinating care.     Nataly Verde DO  Gynecologic Oncology  6/23/2021/3:13 PM

## 2021-08-30 ENCOUNTER — OFFICE VISIT (OUTPATIENT)
Dept: PULMONOLOGY | Age: 76
End: 2021-08-30
Payer: MEDICARE

## 2021-08-30 VITALS
TEMPERATURE: 97.2 F | RESPIRATION RATE: 18 BRPM | HEART RATE: 79 BPM | BODY MASS INDEX: 38.65 KG/M2 | WEIGHT: 232 LBS | HEIGHT: 65 IN | SYSTOLIC BLOOD PRESSURE: 138 MMHG | OXYGEN SATURATION: 95 % | DIASTOLIC BLOOD PRESSURE: 64 MMHG

## 2021-08-30 DIAGNOSIS — I82.462 ACUTE DEEP VEIN THROMBOSIS (DVT) OF CALF MUSCLE VEIN OF LEFT LOWER EXTREMITY (HCC): ICD-10-CM

## 2021-08-30 DIAGNOSIS — J45.901 ASTHMA EXACERBATION IN COPD (HCC): Primary | ICD-10-CM

## 2021-08-30 DIAGNOSIS — J44.1 ASTHMA EXACERBATION IN COPD (HCC): Primary | ICD-10-CM

## 2021-08-30 DIAGNOSIS — J30.9 ALLERGIC SINUSITIS: ICD-10-CM

## 2021-08-30 DIAGNOSIS — R06.02 SOB (SHORTNESS OF BREATH): ICD-10-CM

## 2021-08-30 DIAGNOSIS — C54.1 ENDOMETRIAL CA (HCC): ICD-10-CM

## 2021-08-30 PROBLEM — J44.9 ASTHMA WITH COPD (HCC): Status: ACTIVE | Noted: 2021-08-30

## 2021-08-30 PROCEDURE — 1101F PT FALLS ASSESS-DOCD LE1/YR: CPT | Performed by: INTERNAL MEDICINE

## 2021-08-30 PROCEDURE — G8427 DOCREV CUR MEDS BY ELIG CLIN: HCPCS | Performed by: INTERNAL MEDICINE

## 2021-08-30 PROCEDURE — 99204 OFFICE O/P NEW MOD 45 MIN: CPT | Performed by: INTERNAL MEDICINE

## 2021-08-30 PROCEDURE — 1090F PRES/ABSN URINE INCON ASSESS: CPT | Performed by: INTERNAL MEDICINE

## 2021-08-30 PROCEDURE — G8432 DEP SCR NOT DOC, RNG: HCPCS | Performed by: INTERNAL MEDICINE

## 2021-08-30 PROCEDURE — G8400 PT W/DXA NO RESULTS DOC: HCPCS | Performed by: INTERNAL MEDICINE

## 2021-08-30 PROCEDURE — G8417 CALC BMI ABV UP PARAM F/U: HCPCS | Performed by: INTERNAL MEDICINE

## 2021-08-30 PROCEDURE — G8536 NO DOC ELDER MAL SCRN: HCPCS | Performed by: INTERNAL MEDICINE

## 2021-08-30 RX ORDER — UMECLIDINIUM BROMIDE AND VILANTEROL TRIFENATATE 62.5; 25 UG/1; UG/1
POWDER RESPIRATORY (INHALATION)
COMMUNITY
Start: 2021-08-21

## 2021-08-30 NOTE — LETTER
8/30/2021    Patient: Ramona Perkins   YOB: 1945   Date of Visit: 8/30/2021     Jatin Leon MD  [address]  Via Fax: [phone]    Dear Jatin Leon MD,      Thank you for referring Ms. Ramona Perkins to [address] [place] [address] for evaluation. My notes for this consultation are attached. If you have questions, please do not hesitate to call me. I look forward to following your patient along with you.       Sincerely,    Jyoti Jarrett MD

## 2021-08-30 NOTE — PROGRESS NOTES
Parviz Jordan presents today for   Chief Complaint   Patient presents with    Wheezing    Shortness of Breath       Is someone accompanying this pt?      Is the patient using any DME equipment during [address]? No    -DME Company NA    Depression Screening:  3 most recent PHQ Screens 2/10/2021   Little interest or pleasure in doing things Not at all   Feeling down, depressed, irritable, or hopeless Not at all   Total Score PHQ 2 0       Learning Assessment:  Learning Assessment 10/20/2020   PRIMARY LEARNER Patient   BARRIERS PRIMARY LEARNER [address] CAREGIVER No   PRIMARY LANGUAGE ENGLISH   LEARNER PREFERENCE PRIMARY DEMONSTRATION   ANSWERED BY Patient   RELATIONSHIP SELF       Abuse Screening:  No flowsheet data found. Fall Risk  Fall Risk Assessment, last 12 mths 2/10/2021   Able to walk? Yes   Fall in past 12 months? 0   Do you feel unsteady? 0   Are you worried about falling 0         Coordination of Care:  1. Have you been to the ER, urgent care clinic since your last visit? Hospitalized since your last visit? No    2. Have you seen or consulted any other health care providers outside of the [address] [place] [address] since your last visit? Include any pap smears or colon screening.  No

## 2021-08-30 NOTE — PROGRESS NOTES
[facility]  Pulmonary, Critical Care, and Sleep Medicine      Pulmonary Office Initial Consultation    Name: Dali Hong     : 1945     Date: 2021        Subjective:   Patient has been referred for evaluation of: Shortness of breath on exertion    Patient is a 64 y.o. female gives a history of progressively worsening and persistent symptoms of shortness of breath on exertion which she recalls started around 2020 and got to a point where she needed to see her physician emergently in early January because she could hardly walk in her house without getting extremely short of breath. She was seen by Dr. Shantel Dee who prescribed her a nebulizer and also bronchodilator in the form of Anoro. Apparently it took some time before she could get the nebulizer and when she started using it which was approximately 2 months later she did not find it much helpful. She was using albuterol with some relief. However she continued to have persistent symptoms . It is not clear as to why she did not get the Anoro filled until recently and she has been using it for the past week with significant improvement in her reported symptoms of SOB    SOB: Several months duration-progressively worse and improved with Anoro and Singulair daily use    COUGH: Not a significant symptoms    Chest Pain: Not a symptom      Has had associated wheezing,   Denies chest pain, fever, chills, night sweats dyspepsia, reflux. Comorbid conditions include-diabetes, history of endometrial carcinoma stage IA G1-s/p laparoscopic hysterectomy and bilateral salpingo-oophorectomy 2020-Dr. Mercy Figueroa  History of DVT-acute in left posterior tibial vein and left soleal Vein-treated with anticoagulation for about a month. Stopped May 2021  Smoking status: Ex-smoker quit smoking 20 years back  Cigarette-1 pack a day for 30 years    Occupational exposure-none to any industrial dust organic or inorganic.   Retired from HR work in corporate world  Environmental exposures-none recently. G[place] a child       Review of data:  I have personally reviewed all data-clinical encounters, imaging, outside test results pertinent to patient's care. Testing:  CXR  PFT  Echo    Serologies  Vaccinations:  Pneumococcal  Influenza    DME:  Nebulizer    Past Medical History:   Diagnosis Date    Chronic obstructive pulmonary disease ([facility]     Diabetes ([facility] 2010    Hypercholesteremia     Hypertension 2016    Thromboembolus ([facility]     lower legs, resolved       Past Surgical History:   Procedure Laterality Date    HX GYN      D & C     HX LUMBAR FUSION  2018    Back surgery pinched nerve     HX ROTATOR CUFF REPAIR Left     HX TONSILLECTOMY         Social History     Socioeconomic History    Marital status:      Spouse name: Not on file    Number of children: Not on file    Years of education: Not on file    Highest education level: Not on file   Tobacco Use    Smoking status: Former Smoker     Quit date: 2002     Years since quittin.0    Smokeless tobacco: Never Used   Substance and Sexual Activity    Alcohol use: No    Drug use: No    Sexual activity: Not Currently     Partners: Male     Social Determinants of Health     Financial Resource Strain:     Difficulty of Paying Living Expenses:    Food Insecurity:     Worried About Running Out of Food in the Last Year:     Ran Out of Food in the Last Year:    Transportation Needs:     Lack of Transportation (Medical):      Lack of Transportation (Non-Medical):    Physical Activity:     Days of Exercise per Week:     Minutes of Exercise per Session:    Stress:     Feeling of Stress :    Social Connections:     Frequency of Communication with Friends and Family:     Frequency of Social Gatherings with Friends and Family:     Attends Congregation Services:     Active Member of Clubs or Organizations:     Attends Club or Organization Meetings:     Marital Status:        Family History   Problem Relation Age of Onset    Cancer Maternal Grandmother         Breast        Allergies   Allergen Reactions    Metformin Diarrhea    Other Medication Myalgia     Cholesterol medication (unsure of name of medication)       . Current Outpatient Medications   Medication Sig Dispense Refill    Anoro Ellipta 62.5-25 mcg/actuation inhaler INHALE 1 PUFF BY MOUTH ONCE DAILY      albuterol-ipratropium (DUO-NEB) 2.5 mg-0.5 mg/3 ml nebu Take 3 mL by inhalation four (4) times daily.  montelukast (SINGULAIR) 10 mg tablet TAKE 1 TABLET BY MOUTH NIGHTLY AS NEEDED FOR WHEEZING OR ALLERGIES      albuterol (PROVENTIL HFA, VENTOLIN HFA, PROAIR HFA) 90 mcg/actuation inhaler Take 1 Puff by inhalation every four (4) hours as needed for Wheezing.  ferrous sulfate (Iron) 325 mg (65 mg iron) tablet Take  by mouth Daily (before breakfast).  furosemide (LASIX) 20 mg tablet Take 20 mg by mouth as needed.  LANTUS SOLOSTAR U-100 INSULIN 100 unit/mL (3 mL) inpn 20 Units by SubCUTAneous route nightly.  NOVOLOG FLEXPEN U-100 INSULIN 100 unit/mL inpn 1-6 Units by SubCUTAneous route Before breakfast, lunch, and dinner.  amLODIPine-benazepril (LOTREL) 10-20 mg per capsule Take 1 Cap by mouth daily.  aspirin delayed-release 81 mg tablet Take 81 mg by mouth daily.  colestipoL (COLESTID) 1 gram tablet Take 1 g by mouth two (2) times a day.  Indications: high cholesterol (Patient not taking: Reported on 6/23/2021)           Review of Systems:  HEENT: No epistaxis, no nasal drainage, no difficulty in swallowing, no redness in eyes  Respiratory: as above  Cardiovascular: no chest pain, no palpitations, no chronic leg edema, no syncope  Gastrointestinal: no abd pain, no vomiting, no diarrhea, no bleeding symptoms  Genitourinary: No urinary symptoms or hematuria  Integument/breast: No ulcers or rashes  Musculoskeletal:Neg  Neurological: No focal weakness, no seizures, no headaches  Behvioral/Psych: No anxiety, no depression  Constitutional: No fever, no chills, no weight loss, no night sweats     Objective:     Visit Vitals  /64 (BP 1 Location: Left upper arm, BP Patient Position: Sitting, BP Cuff Size: Large adult)   Pulse 79   Temp 97.2 °F (36.2 °C) (Oral)   Resp 18   Ht 5' 5\" (1.651 m)   Wt 105.2 kg (232 lb)   SpO2 95% Comment: RA Rest   BMI 38.61 kg/m²        Physical Exam:   General: comfortable, no acute distress  HEENT: pupils reactive, sclera anicteric, EOM intact  Neck: No adenopathy or thyroid swelling, no lymphadenopathy or JVD, supple  CVS: S1S2 no murmurs  RS: Mod AE bilaterally, no tactile fremitus or egophony, no accessory muscle use  Abd: soft, non tender, no hepatosplenomegaly  Neuro: non focal, awake, alert  Extrm: Right lower extremity with 2+ leg edema, clubbing or cyanosis  Skin: no rash    Data review:   Pertinent labs: CBC, BMP, LFT's    PFT:    Date FVC FEV1  FEV1/FVC [id number] TLC RV RV/TLC VC DLCO   February 2020    59%  18% response with bronchodilator        95%                                             Results for orders placed or performed during the hospital encounter of 09/09/20   EKG, 12 LEAD, INITIAL   Result Value Ref Range    Ventricular Rate 72 BPM    Atrial Rate 72 BPM    P-R Interval 170 ms    QRS Duration 74 ms    Q-T Interval 388 ms    QTC Calculation (Bezet) 424 ms    Calculated P Axis 66 degrees    Calculated T Axis 22 degrees    Diagnosis       Normal sinus rhythm with sinus arrhythmia  Low voltage QRS  Borderline ECG  When compared with ECG of 30-AUG-2018 17:26,  No significant change was found  Confirmed by Sameer Moore MD, --- ([id number]) on 9/9/2020 4:40:32 PM         Imaging:  I have personally reviewed the patients radiographs and have reviewed the reports:  XR Results (most recent):  Results from Hospital Encounter encounter on 09/26/18    XR SPINE LUMB 2 OR 3 V    Narrative  EXAM: LUMBAR SPINE    INDICATION: decompression and fusion, lumbar spine L3-5 , lumbar stenosis    COMPARISON: No prior study    TECHNIQUE AND FINDINGS:  Intraoperative fluoroscopy was provided. Two digital fluoroscopic images were  obtained. Images show placement of bipedicular screws with interlocking fusion  rods at L3-L5 levels. Accompanying laminectomies are seen. Reticular opacity is  present raising surgical sponge/lap pad. Slight L3-L4 anterior listhesis is  present. Mild endplate spurring is present along with disc height loss,  including L2-3 and L3-4 levels. Fluoroscopy radiation dose (reference air kerma), in mGy: 23.17    ________________    Impression  IMPRESSION:    Evidence of posterior surgical instrument fusion L3-L5 levels with bipedicular  screws and interlocking fusion rods with associated laminectomies. Mild endplate  degenerative changes. Please refer to intraoperative report for further details. CT Results (most recent):  No results found for this or any previous visit. .     Patient Active Problem List   Diagnosis Code    Lumbar stenosis M48.061    Severe obesity ([facility] E66.01    Asthma with COPD ([facility] J44.9    Allergic sinusitis J30.9    Acute deep vein thrombosis (DVT) of calf muscle vein of left lower extremity ([facility]) I82.462    Endometrial ca ([facility]) C54.1     IMPRESSION:   · Shortness of breath dyspnea on exertion-worsening symptoms over past 6 months with multiple comorbid issues. Suspect etiology of her symptoms is multifactorial with predominant component of obstructive airways disease exacerbating-COPD with asthma with clinical response noted to bronchodilator treatment-Anoro and Singulair which was added to address the allergy/asthma component. PFTs with supporting evidence of reversible airway obstruction.   In addition to above concerned about any component of venous thromboembolism-underlying endometrial CA and postop setting with subsequent findings of DVT in left lower extremity veins. Patient was treated with anticoagulation but stopped subsequently. · History of chronic allergic sinusitis-initially seasonal now more perennial  · DVT-likely provoked in setting of postop status as well as malignancy  · Personal history of smoking-quit more than 21 years back with 30-pack-year history  · Chronic right lower extremity edema  · Diabetes      RECOMMENDATIONS:   · Discussed with patient differential diagnosis and current condition  · Will continue Anoro since she has had significant clinical response  · Continue Singulair  · We will check IgE and allergen panel to see if additional therapy warranted-adding inhaled corticosteroids and/or Biologics as next step  · Trigger control-environmental aeroallergens and control of chronic sinusitis which likely is the predominant trigger  · Will check PFTs  · At some point imaging studies would be helpful-we will coordinate with GYN oncology surveillance imaging  · Monitor for any recurrence of VTE-if so will need long-term anticoagulation  · Preventive [id number]-date and defer to primary provider  · We will follow-up in 2 months     Health maintenance screens deferred to Primary care provider. Natali Lujan MD    This patient has a high complexity chronic care condition   This Visit needed Moderate/High complexity medically necessary decision making and management plans.

## 2021-08-31 DIAGNOSIS — R06.02 SOB (SHORTNESS OF BREATH): ICD-10-CM

## 2021-08-31 DIAGNOSIS — J44.1 ASTHMA EXACERBATION IN COPD (HCC): Primary | ICD-10-CM

## 2021-08-31 DIAGNOSIS — J30.9 ALLERGIC SINUSITIS: ICD-10-CM

## 2021-08-31 DIAGNOSIS — J45.901 ASTHMA EXACERBATION IN COPD (HCC): Primary | ICD-10-CM

## 2021-08-31 DIAGNOSIS — I82.462 ACUTE DEEP VEIN THROMBOSIS (DVT) OF CALF MUSCLE VEIN OF LEFT LOWER EXTREMITY (HCC): ICD-10-CM

## 2021-09-10 ENCOUNTER — HOSPITAL ENCOUNTER (OUTPATIENT)
Dept: LAB | Age: 76
Discharge: HOME | End: 2021-09-10
Payer: MEDICARE

## 2021-09-10 DIAGNOSIS — I82.462 ACUTE DEEP VEIN THROMBOSIS (DVT) OF CALF MUSCLE VEIN OF LEFT LOWER EXTREMITY (HCC): ICD-10-CM

## 2021-09-10 DIAGNOSIS — J45.901 ASTHMA EXACERBATION IN COPD (HCC): ICD-10-CM

## 2021-09-10 DIAGNOSIS — J30.9 ALLERGIC SINUSITIS: ICD-10-CM

## 2021-09-10 DIAGNOSIS — J44.1 ASTHMA EXACERBATION IN COPD (HCC): ICD-10-CM

## 2021-09-10 DIAGNOSIS — R06.02 SOB (SHORTNESS OF BREATH): ICD-10-CM

## 2021-09-10 PROCEDURE — 36415 COLL VENOUS BLD VENIPUNCTURE: CPT

## 2021-09-10 PROCEDURE — U0003 INFECTIOUS AGENT DETECTION BY NUCLEIC ACID (DNA OR RNA); SEVERE ACUTE RESPIRATORY SYNDROME CORONAVIRUS 2 (SARS-COV-2) (CORONAVIRUS DISEASE [COVID-19]), AMPLIFIED PROBE TECHNIQUE, MAKING USE OF HIGH THROUGHPUT TECHNOLOGIES AS DESCRIBED BY CMS-2020-01-R: HCPCS

## 2021-09-12 LAB — SARS-COV-2, COV2NT: NOT DETECTED

## 2021-09-15 PROCEDURE — 94729 DIFFUSING CAPACITY: CPT | Performed by: INTERNAL MEDICINE

## 2021-09-15 PROCEDURE — 94060 EVALUATION OF WHEEZING: CPT | Performed by: INTERNAL MEDICINE

## 2021-09-15 PROCEDURE — 94727 GAS DIL/WSHOT DETER LNG VOL: CPT | Performed by: INTERNAL MEDICINE

## 2021-10-20 ENCOUNTER — TELEPHONE (OUTPATIENT)
Dept: PULMONOLOGY | Age: 76
End: 2021-10-20

## 2021-10-27 ENCOUNTER — OFFICE VISIT (OUTPATIENT)
Dept: ONCOLOGY | Age: 76
End: 2021-10-27
Payer: MEDICARE

## 2021-10-27 VITALS
TEMPERATURE: 97.8 F | OXYGEN SATURATION: 97 % | DIASTOLIC BLOOD PRESSURE: 73 MMHG | BODY MASS INDEX: 38.15 KG/M2 | SYSTOLIC BLOOD PRESSURE: 130 MMHG | HEIGHT: 65 IN | WEIGHT: 229 LBS | HEART RATE: 75 BPM

## 2021-10-27 DIAGNOSIS — C54.1 ENDOMETRIAL CANCER (HCC): Primary | ICD-10-CM

## 2021-10-27 PROCEDURE — 1101F PT FALLS ASSESS-DOCD LE1/YR: CPT | Performed by: OBSTETRICS & GYNECOLOGY

## 2021-10-27 PROCEDURE — G8400 PT W/DXA NO RESULTS DOC: HCPCS | Performed by: OBSTETRICS & GYNECOLOGY

## 2021-10-27 PROCEDURE — 99213 OFFICE O/P EST LOW 20 MIN: CPT | Performed by: OBSTETRICS & GYNECOLOGY

## 2021-10-27 PROCEDURE — G8417 CALC BMI ABV UP PARAM F/U: HCPCS | Performed by: OBSTETRICS & GYNECOLOGY

## 2021-10-27 PROCEDURE — G8536 NO DOC ELDER MAL SCRN: HCPCS | Performed by: OBSTETRICS & GYNECOLOGY

## 2021-10-27 PROCEDURE — G8427 DOCREV CUR MEDS BY ELIG CLIN: HCPCS | Performed by: OBSTETRICS & GYNECOLOGY

## 2021-10-27 PROCEDURE — G0463 HOSPITAL OUTPT CLINIC VISIT: HCPCS | Performed by: OBSTETRICS & GYNECOLOGY

## 2021-10-27 PROCEDURE — 1090F PRES/ABSN URINE INCON ASSESS: CPT | Performed by: OBSTETRICS & GYNECOLOGY

## 2021-10-27 PROCEDURE — G8510 SCR DEP NEG, NO PLAN REQD: HCPCS | Performed by: OBSTETRICS & GYNECOLOGY

## 2021-10-27 NOTE — PROGRESS NOTES
[facility]  [address] [place] [address], [address], [facility]  [address] [facility], [address] [facility], [address] [place], [place] [id number] [phone]  Veena Castro DO        Patient ID:  Name: Roque Darby  MRM: [id number]  :  y.o. Date: 10/27/2021    SUBJECTIVE:    This is a 64 y.o.  female with h/o Stage IAG1 endometrial cancer s/p Robotic-assisted total laparoscopic hysterectomy, bilateral salpingo-oophorectomy, staging and washings. on 2020  Here for routine f/u. Currently, Denies Vaginal bleeding, change in vaginal discharge, change in bladder/bowel habits. Her pathology revealed      A: UTERUS, CERVIX, BILATERAL FALLOPIAN TUBES AND OVARIES, HYSTERECTOMY AND BILATERAL SALPINGO-OOPHORECTOMY:   ENDOMETRIOID CARCINOMA, FIGO GRADE 1.   UTERUS WITH ADENOMYOSIS AND LEIOMYOMATA. CERVIX WITH NO PATHOLOGIC CHANGE. BILATERAL FALLOPIAN TUBES WITH NO PATHOLOGIC CHANGE. BILATERAL OVARIES WITH NO PATHOLOGIC CHANGE.   B: RIGHT PELVIC SENTINEL LYMPH NODE, EXCISION:   ONE LYMPH NODE NEGATIVE FOR CARCINOMA (0/1). ENDOMETRIUM   SPECIMEN   Procedure: Total hysterectomy and bilateral salpingo-oophorectomy   Hysterectomy Type: Laparoscopic, robotic-assisted   Specimen Integrity: Intact   TUMOR   Tumor Site: Endometrium   Histologic Type: Endometrioid carcinoma, NOS   Histologic Grade: FIGO grade 1   Myometrial Invasion: Present   Depth of Invasion (Millimeters): 3 mm   Myometrial Thickness (Millimeters): 18 mm   Percentage of Myometrial Invasion: 15 %   Adenomyosis: Present, uninvolved by carcinoma   Uterine Serosa Involvement: Not identified   Lower Uterine Segment Involvement: Not identified   Cervical Stromal Involvement: Not identified   Other Tissue / Organ Involvement: Not applicable   Lymphovascular Invasion: Not identified   MARGINS   LYMPH NODES   Lymph Node Status:  All lymph nodes negative for tumor cells   Total Number of Pelvic Nodes Examined: 1   Number of Pelvic [place] Nodes Examined: 1   PATHOLOGIC STAGE CLASSIFICATION (pTNM, AJCC 8th Edition)   Primary Tumor (pT): pT1a   Regional Lymph Nodes (pN)   Category (pN): p       Medications:     Current Outpatient Medications on File Prior to Visit   Medication Sig Dispense Refill    Anoro Ellipta 62.5-25 mcg/actuation inhaler INHALE 1 PUFF BY MOUTH ONCE DAILY      montelukast (SINGULAIR) 10 mg tablet TAKE 1 TABLET BY MOUTH NIGHTLY AS NEEDED FOR WHEEZING OR ALLERGIES      ferrous sulfate (Iron) 325 mg (65 mg iron) tablet Take  by mouth Daily (before breakfast).  furosemide (LASIX) 20 mg tablet Take 20 mg by mouth as needed.  LANTUS SOLOSTAR U-100 INSULIN 100 unit/mL (3 mL) inpn 20 Units by SubCUTAneous route nightly.  NOVOLOG FLEXPEN U-100 INSULIN 100 unit/mL inpn 1-6 Units by SubCUTAneous route Before breakfast, lunch, and dinner.  amLODIPine-benazepril (LOTREL) 10-20 mg per capsule Take 1 Cap by mouth daily.  aspirin delayed-release 81 mg tablet Take 81 mg by mouth daily.  albuterol-ipratropium (DUO-NEB) 2.5 mg-0.5 mg/3 ml nebu Take 3 mL by inhalation four (4) times daily. (Patient not taking: Reported on 10/27/2021)      albuterol (PROVENTIL HFA, VENTOLIN HFA, PROAIR HFA) 90 mcg/actuation inhaler Take 1 Puff by inhalation every four (4) hours as needed for Wheezing. (Patient not taking: Reported on 10/27/2021)      colestipoL (COLESTID) 1 gram tablet Take 1 g by mouth two (2) times a day. Indications: high cholesterol (Patient not taking: Reported on 6/23/2021)       No current facility-administered medications on file prior to visit. Allergies:      Allergies   Allergen Reactions    Metformin Diarrhea    Other Medication Myalgia     Cholesterol medication (unsure of name of medication)       OBJECTIVE:    Vitals:   Visit Vitals  /73 (BP 1 Location: Left upper arm, BP Patient Position: Sitting)   Pulse 75   Temp 97.8 °F (36.6 °C) (Oral)   Ht 5' 5\" (1.651 m)   Wt 103.9 kg (229 lb)   SpO2 97%   BMI 38.11 kg/m²       Physical Examination:    General:  alert, cooperative, no distress   Abdomen: soft, bowel sounds active, non-tender   Incision: healing well   Pelvic: NEFG, Spec exam revealed atrophic mucosa, no mass BME revealed no masses, nontender   Rectal: not done   Extremity:   extremities normal, atraumatic, no cyanosis or edema     IMPRESSION/PLAN:     Stage IAG1 endometrial cancer   -previously reviewed her pathology and favorable prognosis with low risk of recurrence and no need for adjuvant treatment   -reviewed surveillance strategy including exams every 4 months x 2 years, then every 6 months x 3 years then annually   -discussed s/sx of recurrence   -pelvic exam performed today   -all of patients questions and concerns address   -will plan for f/u in 4 months    Total time spent was 25 minutes regarding diagnosis of endometrial cancer and >50% of this time was spent counseling and coordinating care.     Casie Knowles DO  Gynecologic Oncology  10/27/2021/3:13 PM

## 2021-10-27 NOTE — LETTER
11/1/2021    Patient: Petey Wu   YOB: 1945   Date of Visit: 10/27/2021     Juice Newman MD  [address]  Via Fax: [phone]    Dear Juice Newman MD,      Thank you for referring Ms. Petey Wu to [address] [place] [address] SPECIALIST AT [address] [place] [address] for evaluation. My notes for this consultation are attached. If you have questions, please do not hesitate to call me. I look forward to following your patient along with you.       Sincerely,    Anum Larry MD

## 2021-10-27 NOTE — PROGRESS NOTES
Neris Verma is a 64 y.o. female presents in office for endometrial cancer surveillance. Chief Complaint   Patient presents with    Follow-up     endometrial cancer        Do you have any unusual vaginal bleeding, discharge or irritation? no.  Do you have any changes in your bowel movements? No  Have you been experiencing nausea or vomiting? No  Have you been experiencing any continuous or worsening abdominal pain? no  Any urinary burning? No    Visit Vitals  /73 (BP 1 Location: Left upper arm, BP Patient Position: Sitting)   Pulse 75   Temp 97.8 °F (36.6 °C) (Oral)   Ht 5' 5\" (1.651 m)   Wt 229 lb (103.9 kg)   SpO2 97%   BMI 38.11 kg/m²         1. Have you been to the ER, urgent care clinic since your last visit? Hospitalized since your last visit? No    2. Have you seen or consulted any other health care providers outside of the [address] [place] [address] since your last visit? Include any pap smears or colon screening.  No    3 most recent PHQ Screens 10/27/2021   Little interest or pleasure in doing things Not at all   Feeling down, depressed, irritable, or hopeless Not at all   Total Score PHQ 2 0       Learning Assessment 10/20/2020   PRIMARY LEARNER Patient   BARRIERS PRIMARY LEARNER NONE   CO-LEARNER CAREGIVER No   PRIMARY LANGUAGE ENGLISH   LEARNER PREFERENCE PRIMARY DEMONSTRATION   ANSWERED BY Patient   RELATIONSHIP SELF

## 2021-11-08 ENCOUNTER — TELEPHONE (OUTPATIENT)
Dept: PULMONOLOGY | Age: 76
End: 2021-11-08

## 2021-11-09 NOTE — TELEPHONE ENCOUNTER
PFT  Maximal Mid Expiratory Flow rate is reduced to 32 % predicted   Forced Expiratory Volume in one second is reduced to 70% predicted   FEV 1% is reduced     Volumes:   Normal Volumes     Flow Volume Loop:   Nonspecific obstructive pattern in Flow Volume Loop     Bronchodilator:   Partial improvement with bronchodilators     Diffusion:   Abnormal Diffusion Capacity reduced to 55 % predicted     Impression:   Mild to Moderate obstructive defect, Reduced diffusion capacity indicating a decrease in alveolar surface area for gas exchange     I called the patient and discussed results of the test with her. Instructed to continue Anoro  Instructed to get blood work drawn-IgE and CBC with differential which I had ordered.   She will get this done-I will call her with results when available and follow-up in clinic-please make appointment next available

## 2021-11-17 ENCOUNTER — HOSPITAL ENCOUNTER (OUTPATIENT)
Dept: LAB | Age: 76
Discharge: HOME | End: 2021-11-17
Payer: MEDICARE

## 2021-11-17 DIAGNOSIS — J44.1 ASTHMA EXACERBATION IN COPD (HCC): ICD-10-CM

## 2021-11-17 DIAGNOSIS — J45.901 ASTHMA EXACERBATION IN COPD (HCC): ICD-10-CM

## 2021-11-17 PROCEDURE — 82785 ASSAY OF IGE: CPT

## 2021-11-17 PROCEDURE — 86003 ALLG SPEC IGE CRUDE XTRC EA: CPT

## 2021-11-17 PROCEDURE — 36415 COLL VENOUS BLD VENIPUNCTURE: CPT

## 2021-11-19 LAB — IGE SERPL-ACNC: 497 IU/ML (ref 6–495)

## 2021-11-20 LAB
A ALTERNATA IGE QN: <0.1 KU/L
A FUMIGATUS IGE QN: <0.1 KU/L
AMER ROACH IGE QN: <0.1 KU/L
AMER SYCAMORE IGE QN: 0.15 KU/L
BAHIA GRASS IGE QN: 0.2 KU/L
BERMUDA GRASS IGE QN: <0.1 KU/L
BOXELDER IGE QN: 0.16 KU/L
C HERBARUM IGE QN: <0.1 KU/L
CAT DANDER IGG QN: <0.1 KU/L
CLASS DESCRIPTION, 600268: ABNORMAL
COMMON RAGWEED IGE QN: 0.14 KU/L
D FARINAE IGE QN: 25.9 KU/L
D PTERONYSS IGE QN: 12.9 KU/L
DEPRECATED IGE QN: 0.13 KU/L
DOG DANDER IGE QN: <0.1 KU/L
ENGL PLANTAIN IGE QN: <0.1 KU/L
JOHNSON GRASS IGE QN: 0.29 KU/L
M RACEMOSUS IGE QN: <0.1 KU/L
MT JUNIPER IGE QN: <0.1 KU/L
MUGWORT IGE QN: 0.23 KU/L
NETTLE IGE QN: 0.36 KU/L
P NOTATUM IGE QN: 0.12 KU/L
S BOTRYOSUM IGE QN: <0.1 KU/L
SHEEP SORREL IGE QN: 0.21 KU/L
SWEET GUM IGE QN: <0.1 KU/L
TIMOTHY IGE QN: 0.55 KU/L
WHITE BIRCH IGE QN: 0.11 KU/L
WHITE ELM IGG QN: 0.39 KU/L
WHITE HICKORY IGE QN: 1.13 KU/L
WHITE MULBERRY IGE QN: <0.1 KU/L
WHITE OAK IGE QN: 0.11 KU/L

## 2021-12-22 ENCOUNTER — TELEPHONE (OUTPATIENT)
Dept: PULMONOLOGY | Age: 76
End: 2021-12-22

## 2021-12-22 RX ORDER — FLUTICASONE FUROATE, UMECLIDINIUM BROMIDE AND VILANTEROL TRIFENATATE 100; 62.5; 25 UG/1; UG/1; UG/1
1 POWDER RESPIRATORY (INHALATION) DAILY
Qty: 28 EACH | Refills: 1 | Status: SHIPPED | COMMUNITY
Start: 2021-12-22 | End: 2022-01-20

## 2021-12-22 NOTE — TELEPHONE ENCOUNTER
Pt is wanting results of pfts and labs done by in Nov and would like a call from Dr. Hanson Forward. Please call pt at [phone].

## 2021-12-22 NOTE — TELEPHONE ENCOUNTER
Called and spoke to patient. Explained all test results. Based on IgE, allergen test and PFT will add ICS to current inhaler. She is on Anoro at present- will trial Trelegy.    Will provide Samples for 1 month and consider prescription thereafter  Will follow up in clinic in 1 month  Patient will  samples on 12/23/21 from clinic

## 2022-01-20 ENCOUNTER — OFFICE VISIT (OUTPATIENT)
Dept: PULMONOLOGY | Age: 77
End: 2022-01-20
Payer: MEDICARE

## 2022-01-20 VITALS
WEIGHT: 235.4 LBS | OXYGEN SATURATION: 95 % | RESPIRATION RATE: 18 BRPM | BODY MASS INDEX: 39.22 KG/M2 | SYSTOLIC BLOOD PRESSURE: 154 MMHG | TEMPERATURE: 98.5 F | DIASTOLIC BLOOD PRESSURE: 76 MMHG | HEIGHT: 65 IN | HEART RATE: 65 BPM

## 2022-01-20 DIAGNOSIS — J44.9 ASTHMA WITH COPD (HCC): Primary | ICD-10-CM

## 2022-01-20 DIAGNOSIS — J30.9 ALLERGIC SINUSITIS: ICD-10-CM

## 2022-01-20 DIAGNOSIS — E66.01 SEVERE OBESITY (HCC): ICD-10-CM

## 2022-01-20 DIAGNOSIS — I82.462 ACUTE DEEP VEIN THROMBOSIS (DVT) OF CALF MUSCLE VEIN OF LEFT LOWER EXTREMITY (HCC): ICD-10-CM

## 2022-01-20 PROCEDURE — G8432 DEP SCR NOT DOC, RNG: HCPCS | Performed by: INTERNAL MEDICINE

## 2022-01-20 PROCEDURE — G8400 PT W/DXA NO RESULTS DOC: HCPCS | Performed by: INTERNAL MEDICINE

## 2022-01-20 PROCEDURE — 99214 OFFICE O/P EST MOD 30 MIN: CPT | Performed by: INTERNAL MEDICINE

## 2022-01-20 PROCEDURE — 1090F PRES/ABSN URINE INCON ASSESS: CPT | Performed by: INTERNAL MEDICINE

## 2022-01-20 PROCEDURE — G8417 CALC BMI ABV UP PARAM F/U: HCPCS | Performed by: INTERNAL MEDICINE

## 2022-01-20 PROCEDURE — 1101F PT FALLS ASSESS-DOCD LE1/YR: CPT | Performed by: INTERNAL MEDICINE

## 2022-01-20 PROCEDURE — G8427 DOCREV CUR MEDS BY ELIG CLIN: HCPCS | Performed by: INTERNAL MEDICINE

## 2022-01-20 PROCEDURE — G8536 NO DOC ELDER MAL SCRN: HCPCS | Performed by: INTERNAL MEDICINE

## 2022-01-20 RX ORDER — FLUTICASONE FUROATE, UMECLIDINIUM BROMIDE AND VILANTEROL TRIFENATATE 200; 62.5; 25 UG/1; UG/1; UG/1
1 POWDER RESPIRATORY (INHALATION) DAILY
Qty: 28 EACH | Refills: 0 | Status: SHIPPED | COMMUNITY
Start: 2022-01-20

## 2022-01-20 RX ORDER — BENAZEPRIL HYDROCHLORIDE 20 MG/1
TABLET ORAL
COMMUNITY
Start: 2021-12-23

## 2022-01-20 RX ORDER — FLUTICASONE FUROATE, UMECLIDINIUM BROMIDE AND VILANTEROL TRIFENATATE 200; 62.5; 25 UG/1; UG/1; UG/1
1 POWDER RESPIRATORY (INHALATION) DAILY
Qty: 28 EACH | Refills: 0 | Status: SHIPPED | OUTPATIENT
Start: 2022-01-20 | End: 2022-01-20

## 2022-01-20 RX ORDER — PRAVASTATIN SODIUM 20 MG/1
20 TABLET ORAL DAILY
COMMUNITY
Start: 2021-12-02

## 2022-01-20 NOTE — LETTER
1/20/2022    Patient: Ana Alvaraod   YOB: 1945   Date of Visit: 1/20/2022     Jessica Joseph MD  [address]  Via Fax: [phone]    Dear Jessica Joseph MD,      Thank you for referring Ms. Ana Alvarado to [address] [place] [address] for evaluation. My notes for this consultation are attached. If you have questions, please do not hesitate to call me. I look forward to following your patient along with you.       Sincerely,    Michiel Buerger, MD

## 2022-01-20 NOTE — PROGRESS NOTES
[facility]  Pulmonary, Critical Care, and Sleep Medicine      Pulmonary Office follow-up visit    Name: Nica Tinoco     : 1945     Date: 2022        Subjective:   Patient has been referred for evaluation of: Shortness of breath on exertion    22   Patient is here for follow-up after completing requested testing. She completed PFTs, blood work that included IgE level and allergen panel. I had discussed the results of testing with patient over the phone-evidence of reversible airway obstruction, elevated IgE and allergen panel positive significantly for various tested allergens predominantly dust, dust mite. I have changed her Anoro to Trelegy and instructed her on some environmental control measures. She states that she did not see much difference with the Trelegy but is willing to try little longer. In the meantime overall she is dealing better with her symptoms-still has some congestion and need to clear her throat when she lays down at night  Denies any overt wheezing or coughing episodes  Denies shortness of breath  Her blood pressure medications were changed by her PCP-due to edema which has improved but the blood pressure is not as well-controlled  Patient states she has received all her vaccines for COVID-19 including booster dose. HPI  Patient is a 64 y.o. female gives a history of progressively worsening and persistent symptoms of shortness of breath on exertion which she recalls started around 2020 and got to a point where she needed to see her physician emergently in early January because she could hardly walk in her house without getting extremely short of breath. She was seen by Dr. Alex Poole who prescribed her a nebulizer and also bronchodilator in the form of Anoro. Apparently it took some time before she could get the nebulizer and when she started using it which was approximately 2 months later she did not find it much helpful.   She was using albuterol with some relief. However she continued to have persistent symptoms . It is not clear as to why she did not get the Anoro filled until recently and she has been using it for the past week with significant improvement in her reported symptoms of SOB    SOB: Several months duration-progressively worse and improved with Anoro and Singulair daily use    COUGH: Not a significant symptoms    Chest Pain: Not a symptom      Has had associated wheezing,   Denies chest pain, fever, chills, night sweats dyspepsia, reflux. Comorbid conditions include-diabetes, history of endometrial carcinoma stage IA G1-s/p laparoscopic hysterectomy and bilateral salpingo-oophorectomy September 2020-Dr. Oral Prader  History of DVT-acute in left posterior tibial vein and left soleal Vein-treated with anticoagulation for about a month. Stopped May 2021  Smoking status: Ex-smoker quit smoking 20 years back  Cigarette-1 pack a day for 30 years    Occupational exposure-none to any industrial dust organic or inorganic. 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 [place] a child       Review of data:  I have personally reviewed all data-clinical encounters, imaging, outside test results pertinent to patient's care.   Testing:  CXR  PFT  Echo    Serologies  Vaccinations:  Pneumococcal  Influenza    DME:  Nebulizer    Past Medical History:   Diagnosis Date    Chronic obstructive pulmonary disease ([address]     Diabetes ([address] 2010    Hypercholesteremia     Hypertension 2016    Thromboembolus ([address]     lower legs, resolved       Past Surgical History:   Procedure Laterality Date    HX GYN      D & C     HX LUMBAR FUSION  2018    Back surgery pinched nerve     HX ROTATOR CUFF REPAIR Left     HX TONSILLECTOMY         Allergies   Allergen Reactions    Metformin Diarrhea    Other Medication Myalgia     Cholesterol medication (unsure of name of medication) .  Current Outpatient Medications   Medication Sig Dispense Refill    benazepriL (LOTENSIN) 20 mg tablet Take 1 po QD for HTN, raise to 2 tabs QD if BP > 140/90 (replaces lotrel)      pravastatin (PRAVACHOL) 20 mg tablet Take 20 mg by mouth daily.  montelukast (SINGULAIR) 10 mg tablet TAKE 1 TABLET BY MOUTH NIGHTLY AS NEEDED FOR WHEEZING OR ALLERGIES      ferrous sulfate (Iron) 325 mg (65 mg iron) tablet Take  by mouth Daily (before breakfast).  furosemide (LASIX) 20 mg tablet Take 20 mg by mouth as needed.  LANTUS SOLOSTAR U-100 INSULIN 100 unit/mL (3 mL) inpn 20 Units by SubCUTAneous route nightly.  NOVOLOG FLEXPEN U-100 INSULIN 100 unit/mL inpn 1-6 Units by SubCUTAneous route Before breakfast, lunch, and dinner.  aspirin delayed-release 81 mg tablet Take 81 mg by mouth daily.  Anoro Ellipta 62.5-25 mcg/actuation inhaler INHALE 1 PUFF BY MOUTH ONCE DAILY (Patient not taking: Reported on 1/20/2022)      albuterol-ipratropium (DUO-NEB) 2.5 mg-0.5 mg/3 ml nebu Take 3 mL by inhalation four (4) times daily. (Patient not taking: Reported on 10/27/2021)      albuterol (PROVENTIL HFA, VENTOLIN HFA, PROAIR HFA) 90 mcg/actuation inhaler Take 1 Puff by inhalation every four (4) hours as needed for Wheezing. (Patient not taking: Reported on 10/27/2021)      colestipoL (COLESTID) 1 gram tablet Take 1 g by mouth two (2) times a day. Indications: high cholesterol (Patient not taking: Reported on 6/23/2021)      amLODIPine-benazepril (LOTREL) 10-20 mg per capsule Take 1 Cap by mouth daily.  (Patient not taking: Reported on 1/20/2022)       Review of Systems:  HEENT: No epistaxis, no nasal drainage, no difficulty in swallowing, no redness in eyes  Respiratory: as above  Cardiovascular: no chest pain, no palpitations, no chronic leg edema, no syncope  Gastrointestinal: no abd pain, no vomiting, no diarrhea, no bleeding symptoms  Genitourinary: No urinary symptoms or hematuria  Integument/breast: No ulcers or rashes  Musculoskeletal:Neg  Neurological: No focal weakness, no seizures, no headaches  Behvioral/Psych: No anxiety, no depression  Constitutional: No fever, no chills, no weight loss, no night sweats     Objective:     Visit Vitals  BP (!) 154/76 (BP 1 Location: Right upper arm, BP Patient Position: Sitting, BP Cuff Size: Large adult)   Pulse 65   Temp 98.5 °F (36.9 °C) (Oral)   Resp 18   Ht 5' 5\" (1.651 m)   Wt 106.8 kg (235 lb 6.4 oz)   SpO2 95% Comment: RA Rest   BMI 39.17 kg/m²        Physical Exam:   General: comfortable, no acute distress  HEENT: pupils reactive, sclera anicteric, EOM intact  Neck: No adenopathy or thyroid swelling, no lymphadenopathy or JVD, supple  CVS: S1S2 no murmurs  RS: Mod AE bilaterally, no tactile fremitus or egophony, no accessory muscle use  Abd: soft, non tender, no hepatosplenomegaly  Neuro: non focal, awake, alert  Extrm: Right lower extremity with 2+ leg edema, clubbing or cyanosis  Skin: no rash    Data review:   Pertinent labs: CBC, BMP, LFT's  [id number]  [facility] allergen panel  abnormal data ALLERGEN PROFILE, ZONE 2  Order: [id number]   Collected 11/17/2021 14:50     Status: Final result     Next appt: 03/23/2022 at 01:15 PM in Oncology Pat Andres MD)     Dx: Asthma exacerbation in COPD [facility])     0 Result Notes     Ref Range & Units 11/17/21 1450   D. pteronyssinus Class IV kU/L 12.90 Abnormal     D. farinae Mite Class V kU/L 25.90 Abnormal     Cat Hair/Dander Class 0 kU/L <0.10    Dog Hair/Dander Class 0 kU/L <0.10    [place] Grass Class 0 kU/L <0.10    Alexsander grass Class I kU/L 0.55 Abnormal     Fox Grass Class 0/I kU/L 0.29 Abnormal     [place] Grass Class 0/I kU/L 0.20 Abnormal     Cockroach,American Class 0 kU/L <0.10    Comment: (NOTE)   This test was developed and its performance characteristics   determined by [facility] has not been cleared or approved by the   U.S. Food and Drug Administration.    The FDA has determined that such clearance or approval is not   necessary. This test is used for clinical purposes.  It should not   be regarded as investigational or for research. Penicillium notatum Class 0/I kU/L 0.12 Abnormal     Cladosporium herbarum Class 0 kU/L <0.10    Aspergillus fumigatus Class 0 kU/L <0.10    Mucor racemosus Class 0 kU/L <0.10    Alternaria tenuis Class 0 kU/L <0.10    Stemphylium botryosum Class 0 kU/L <0.10    White [place]ch Class 0/I kU/L 0.11 Abnormal     [place] Class 0/I kU/L 0.11 Abnormal     American [place] Class I kU/L 0.39 Abnormal     Maple/Maries Class 0/I kU/L 0.16 Abnormal     White [place] Class II kU/L 1.13 Abnormal     Comment: (NOTE)   This test was developed and its performance characteristics   determined by [facility] has not been cleared or approved by the   U.S. Food and Drug Administration. The FDA has determined that such clearance or approval is not   necessary. This test is used for clinical purposes.  It should not   be regarded as investigational or for research. American [place] Class 0/I kU/L 0.15 Abnormal     White [place] Class 0 kU/L <0.10    Sweet Gum Class 0 kU/L <0.10    Comment: (NOTE)   This test was developed and its performance characteristics   determined by [facility] has not been cleared or approved by the   U.S. Food and Drug Administration. The FDA has determined that such clearance or approval is not   necessary. This test is used for clinical purposes.  It should not   be regarded as investigational or for research.     [facility] Class 0 kU/L <0.10    Ragweed, Short/Common Class 0/I kU/L 0.14 Abnormal     Mugwort Class 0/I kU/L 0.23 Abnormal     Plantain, English Class 0 kU/L <0.10    Pigweed, Rough Class 0/I kU/L 0.13 Abnormal     Sheep Sorrel (Dock) Class 0/I kU/L 0.21 Abnormal     Nettle Class I kU/L 0.36 Abnormal     Comment: (NOTE)   Performed At: [facility] & [address] [id number] Shakira Royal MD FI:[id number]    Class description   Comment    Comment: (NOTE)      Levels of Specific IgE       Class  Description of Class      ---------------------------  -----  --------------------                     < 0.10         0         Negative             0.10 -    0.31         0/I       Equivocal/Low             0.32 -    0.55         I         Low             0.56 -    1.40         II        Moderate                    PFT:            Date FVC FEV1  FEV1/FVC [id number] TLC RV RV/TLC VC DLCO   February 2020    59%  18% response with bronchodilator        95%   8/31/2021  90  70/78  reduced  32/30  98  102  105  93  55%/75%                                 Results for orders placed or performed during the hospital encounter of 09/09/20   EKG, 12 LEAD, INITIAL   Result Value Ref Range    Ventricular Rate 72 BPM    Atrial Rate 72 BPM    P-R Interval 170 ms    QRS Duration 74 ms    Q-T Interval 388 ms    QTC Calculation (Bezet) 424 ms    Calculated P Axis 66 degrees    Calculated T Axis 22 degrees    Diagnosis       Normal sinus rhythm with sinus arrhythmia  Low voltage QRS  Borderline ECG  When compared with ECG of 30-AUG-2018 17:26,  No significant change was found  Confirmed by Sameer Edouard MD, --- ([id number]) on 9/9/2020 4:40:32 PM         Imaging:  I have personally reviewed the patients radiographs and have reviewed the reports:  XR Results (most recent):  Results from Hospital Encounter encounter on 09/26/18    XR SPINE LUMB 2 OR 3 V    Narrative  EXAM: LUMBAR SPINE    INDICATION: decompression and fusion, lumbar spine L3-5 , lumbar stenosis    COMPARISON: No prior study    TECHNIQUE AND FINDINGS:  Intraoperative fluoroscopy was provided. Two digital fluoroscopic images were  obtained. Images show placement of bipedicular screws with interlocking fusion  rods at L3-L5 levels. Accompanying laminectomies are seen. Reticular opacity is  present raising surgical sponge/lap pad.  Slight L3-L4 anterior listhesis is  present. Mild endplate spurring is present along with disc height loss,  including L2-3 and L3-4 levels. Fluoroscopy radiation dose (reference air kerma), in mGy: 23.17    ________________    Impression  IMPRESSION:    Evidence of posterior surgical instrument fusion L3-L5 levels with bipedicular  screws and interlocking fusion rods with associated laminectomies. Mild endplate  degenerative changes. Please refer to intraoperative report for further details. CT Results (most recent):  No results found for this or any previous visit. .     Patient Active Problem List   Diagnosis Code    Lumbar stenosis M48.061    Severe obesity ([address] E66.01    Asthma with COPD ([facility] J44.9    Allergic sinusitis J30.9    Acute deep vein thrombosis (DVT) of calf muscle vein of left lower extremity ([facility]) I82.462    Endometrial ca ([facility]) C54.1     IMPRESSION:   · Asthma with COPD-bronchodilator response noted with moderate obstructive airway impairment both fixed and reversible. Significant allergic component-crossfire reaction to dust mite suggests T2 high asthma with role of inhaled steroids and/or consideration for Biologics and treatment  · Shortness of breath dyspnea on exertion-worsening symptoms over past 6 months with multiple comorbid issues. Suspect etiology of her symptoms is multifactorial with predominant component of obstructive airways disease exacerbating-COPD with asthma with clinical response noted to bronchodilator treatment-Anoro and Singulair which was added to address the allergy/asthma component. PFTs with supporting evidence of reversible airway obstruction. In addition to above concerned about any component of venous thromboembolism-underlying endometrial CA and postop setting with subsequent findings of DVT in left lower extremity veins. Patient was treated with anticoagulation but stopped subsequently.   · History of chronic allergic sinusitis-initially seasonal now more perennial.  · DVT-likely provoked in setting of postop status as well as malignancy  · Personal history of smoking-quit more than 21 years back with 30-pack-year history  · Chronic right lower extremity edema  · Diabetes      RECOMMENDATIONS:   · Discussed with patient need to continue to optimize treatment fine-tuning all reversible factors. · Will continue Trelegy-samples of Trelegy 200 provided to patient for continued use-if not able to get additional benefit can go back to using Anoro  · Continue Singulair  · Based on current symptom control Will hold off on starting Biologics however there is definitely a role in future if symptom control becomes an issue  · Trigger control-environmental aeroallergens and control of chronic sinusitis which likely is the predominant trigger-patient has been working on cleaning up home environment  · At some point imaging studies would be helpful-we will coordinate with GYN oncology surveillance imaging  · Monitor for any recurrence of VTE-if so will need long-term anticoagulation  · Preventive ehfkulhdhegl-vx-pd-date and defer to primary provider  · We will follow-up in 3-4 months     Health maintenance screens deferred to Primary care provider. Job Wright MD    This patient has a high complexity chronic care condition   This Visit needed Moderate/High complexity medically necessary decision making and management plans.

## 2022-01-20 NOTE — PATIENT INSTRUCTIONS
Asthma in Adults: Care Instructions  Overview     Asthma makes it hard for you to breathe. During an asthma attack, the airways swell and narrow. Severe asthma attacks can be dangerous, but you can usually prevent them. Controlling asthma and treating symptoms before they get bad can help you avoid bad attacks. You may also avoid future trips to the doctor. Follow-up care is a key part of your treatment and safety. Be sure to make and go to all appointments, and call your doctor if you are having problems. It's also a good idea to know your test results and keep a list of the medicines you take. How can you care for yourself at home? · Follow your asthma action plan so you can manage your symptoms at home. An asthma action plan will help you prevent and control airway reactions and will tell you what to do during an asthma attack. If you do not have an asthma action plan, work with your doctor to build one. · Take your asthma medicine exactly as prescribed. Medicine plays an important role in controlling asthma. Talk to your doctor right away if you have any questions about what to take and how to take it. ? Use your quick-relief medicine when you have symptoms of an asthma attack. Some people need to use quick-relief medicine before they exercise to prevent asthma symptoms. Albuterol is a quick-relief medicine that is often used. In some cases, a certain type of controller inhaler is used as a quick-relief medicine. Ask your doctor what to use for quick relief. ? Take your controller medicine. If you have symptoms often, you will likely need to take it every day. Controller medicine usually includes an inhaled corticosteroid. The goal is to prevent problems before they occur. ? If your doctor prescribed corticosteroid pills to use during an attack, take them as directed. They may take hours to work, but they may shorten the attack and help you breathe better. ?  Keep your quick-relief medicine with you at all times. · Talk to your doctor before using other medicines. Some medicines, such as aspirin, can cause asthma attacks in some people. · Check yourself for asthma symptoms to know which step to follow in your action plan. Watch for things like being short of breath, having chest tightness, coughing, and wheezing. Also notice if symptoms wake you up at night or if you get tired quickly when you exercise. · If you have a peak flow meter, use it to check how well you are breathing. This can help you predict when an asthma attack is going to occur. Then you can take medicine to prevent the asthma attack or make it less severe. · See your doctor regularly. These visits will help you learn more about asthma and what you can do to control it. Your doctor will monitor your treatment to make sure the medicine is helping you. · Keep track of your asthma attacks and your treatment. After you have had an attack, write down what triggered it, what helped end it, and any concerns you have about your asthma action plan. Take your diary when you see your doctor. You can then review your asthma action plan and decide if it is working. · Do not smoke or allow others to smoke around you. Avoid smoky places. Smoking makes asthma worse. If you need help quitting, talk to your doctor about stop-smoking programs and medicines. These can increase your chances of quitting for good. · Learn what triggers an asthma attack for you, and avoid the triggers when you can. Common triggers include colds, smoke, air pollution, dust, pollen, mold, pets, cockroaches, stress, and cold air. · Avoid colds and the flu. Talk to your doctor about getting a pneumococcal vaccine shot. If you have had one before, ask your doctor whether you need a second dose. Get a flu vaccine every fall. If you must be around people with colds or the flu, wash your hands often. When should you call for help?    Call 911 anytime you think you may need emergency care. For example, call if:    · You have severe trouble breathing. Call your doctor now or seek immediate medical care if:    · Your symptoms do not get better after you have followed your asthma action plan.     · You cough up yellow, dark brown, or bloody mucus (sputum). Watch closely for changes in your health, and be sure to contact your doctor if:    · Your coughing and wheezing get worse.     · You need to use quick-relief medicine on more than 2 days a week within a month (unless it is just for exercise).     · You need help figuring out what is triggering your asthma attacks. Where can you learn more? Go to http://www.gray.com/  Enter P597 in the search box to learn more about \"Asthma in Adults: Care Instructions. \"  Current as of: July 6, 2021               Content Version: 13.0  © 7781-0683 [employer]. Care instructions adapted under license by [employer] (which disclaims liability or warranty for this information). If you have questions about a medical condition or this instruction, always ask your healthcare professional. [address] any warranty or liability for your use of this information.

## 2022-01-20 NOTE — PROGRESS NOTES
Alannah Marychuy presents today for   Chief Complaint   Patient presents with    Results     Labs, PFT     Wheezing       Is someone accompanying this pt? No    Is the patient using any DME equipment during OV? No    -DME Company NA    Depression Screening:  3 most recent PHQ Screens 10/27/2021   Little interest or pleasure in doing things Not at all   Feeling down, depressed, irritable, or hopeless Not at all   Total Score PHQ 2 0       Learning Assessment:  Learning Assessment 10/20/2020   PRIMARY LEARNER Patient   BARRIERS PRIMARY LEARNER [address] CAREGIVER No   PRIMARY LANGUAGE ENGLISH   LEARNER PREFERENCE PRIMARY DEMONSTRATION   ANSWERED BY Patient   RELATIONSHIP SELF       Abuse Screening:  No flowsheet data found. Fall Risk  Fall Risk Assessment, last 12 mths 10/27/2021   Able to walk? Yes   Fall in past 12 months? 0   Do you feel unsteady? 0   Are you worried about falling 0         Coordination of Care:  1. Have you been to the ER, urgent care clinic since your last visit? Hospitalized since your last visit? No    2. Have you seen or consulted any other health care providers outside of the [address] [place] [address] since your last visit? Include any pap smears or colon screening.  No

## 2022-03-23 ENCOUNTER — OFFICE VISIT (OUTPATIENT)
Dept: ONCOLOGY | Age: 77
End: 2022-03-23
Payer: MEDICARE

## 2022-03-23 VITALS
OXYGEN SATURATION: 98 % | HEIGHT: 65 IN | TEMPERATURE: 97.2 F | DIASTOLIC BLOOD PRESSURE: 81 MMHG | BODY MASS INDEX: 38.82 KG/M2 | HEART RATE: 82 BPM | SYSTOLIC BLOOD PRESSURE: 142 MMHG | WEIGHT: 233 LBS

## 2022-03-23 DIAGNOSIS — C54.1 ENDOMETRIAL CANCER (HCC): Primary | ICD-10-CM

## 2022-03-23 PROCEDURE — G0463 HOSPITAL OUTPT CLINIC VISIT: HCPCS | Performed by: OBSTETRICS & GYNECOLOGY

## 2022-03-23 PROCEDURE — G8400 PT W/DXA NO RESULTS DOC: HCPCS | Performed by: OBSTETRICS & GYNECOLOGY

## 2022-03-23 PROCEDURE — G8536 NO DOC ELDER MAL SCRN: HCPCS | Performed by: OBSTETRICS & GYNECOLOGY

## 2022-03-23 PROCEDURE — G8417 CALC BMI ABV UP PARAM F/U: HCPCS | Performed by: OBSTETRICS & GYNECOLOGY

## 2022-03-23 PROCEDURE — 1101F PT FALLS ASSESS-DOCD LE1/YR: CPT | Performed by: OBSTETRICS & GYNECOLOGY

## 2022-03-23 PROCEDURE — G8427 DOCREV CUR MEDS BY ELIG CLIN: HCPCS | Performed by: OBSTETRICS & GYNECOLOGY

## 2022-03-23 PROCEDURE — 1090F PRES/ABSN URINE INCON ASSESS: CPT | Performed by: OBSTETRICS & GYNECOLOGY

## 2022-03-23 PROCEDURE — 99213 OFFICE O/P EST LOW 20 MIN: CPT | Performed by: OBSTETRICS & GYNECOLOGY

## 2022-03-23 PROCEDURE — G8510 SCR DEP NEG, NO PLAN REQD: HCPCS | Performed by: OBSTETRICS & GYNECOLOGY

## 2022-03-23 NOTE — PROGRESS NOTES
Estela Nathan is a 64 y.o. female presents in office for endometrial cancer surveillance. Chief Complaint   Patient presents with    Follow-up        Do you have any unusual vaginal bleeding, discharge or irritation? no.  Do you have any changes in your bowel movements? No  Have you been experiencing nausea or vomiting? No  Have you been experiencing any continuous or worsening abdominal pain? no  Any urinary burning? No    Visit Vitals  BP (!) 142/81 (BP 1 Location: Right lower arm, BP Patient Position: Sitting)   Pulse 82   Temp 97.2 °F (36.2 °C) (Oral)   Ht 5' 5\" (1.651 m)   Wt 233 lb (105.7 kg)   SpO2 98%   BMI 38.77 kg/m²         1. Have you been to the ER, urgent care clinic since your last visit? Hospitalized since your last visit? No    2. Have you seen or consulted any other health care providers outside of the [address] [place] [address] since your last visit? Include any pap smears or colon screening.  No    3 most recent PHQ Screens 3/23/2022   Little interest or pleasure in doing things Not at all   Feeling down, depressed, irritable, or hopeless Not at all   Total Score PHQ 2 0       Learning Assessment 10/20/2020   PRIMARY LEARNER Patient   BARRIERS PRIMARY LEARNER NONE   CO-LEARNER CAREGIVER No   PRIMARY LANGUAGE ENGLISH   LEARNER PREFERENCE PRIMARY DEMONSTRATION   ANSWERED BY Patient   RELATIONSHIP SELF

## 2022-03-23 NOTE — LETTER
3/23/2022 11:59 AM    Patient:  Brandon Trejo   YOB: 1945  Date of Visit: 3/23/2022      Dear Ivette Jameson MD  [address] [place] [id number]  Via Fax: [phone]: Thank you for referring Ms. Brandon Trejo to me for evaluation/treatment. Below are the relevant portions of my assessment and plan of care. If you have questions, please do not hesitate to call me. I look forward to following Ms. Jack along with you. [address]  [address] [place] [address], [address], [address] [facility]  [address] [place],  [place]   [phone]  Monica Chau DO        Patient ID:  Name: Brandon Trejo  MRM: [id number]  : 1945/76 y.o. Date: 3/23/2022    SUBJECTIVE:    This is a 64 y.o.  female with h/o Stage IAG1 endometrial cancer s/p Robotic-assisted total laparoscopic hysterectomy, bilateral salpingo-oophorectomy, staging and washings. on 2020  Here for routine f/u. Currently, Denies Vaginal bleeding, change in vaginal discharge, change in bladder/bowel habits. Her pathology revealed      A: UTERUS, CERVIX, BILATERAL FALLOPIAN TUBES AND OVARIES, HYSTERECTOMY AND BILATERAL SALPINGO-OOPHORECTOMY:   ENDOMETRIOID CARCINOMA, FIGO GRADE 1.   UTERUS WITH ADENOMYOSIS AND LEIOMYOMATA. CERVIX WITH NO PATHOLOGIC CHANGE. BILATERAL FALLOPIAN TUBES WITH NO PATHOLOGIC CHANGE. BILATERAL OVARIES WITH NO PATHOLOGIC CHANGE.   B: RIGHT PELVIC SENTINEL LYMPH NODE, EXCISION:   ONE LYMPH NODE NEGATIVE FOR CARCINOMA (0/1). ENDOMETRIUM   SPECIMEN   Procedure:  Total hysterectomy and bilateral salpingo-oophorectomy   Hysterectomy Type: Laparoscopic, robotic-assisted   Specimen Integrity: Intact   TUMOR   Tumor Site: Endometrium   Histologic Type: Endometrioid carcinoma, NOS   Histologic Grade: FIGO grade 1   Myometrial Invasion: Present   Depth of Invasion (Millimeters): 3 mm   Myometrial Thickness (Millimeters): 18 mm   Percentage of Myometrial Invasion: 15 %   Adenomyosis: Present, uninvolved by carcinoma   Uterine Serosa Involvement: Not identified   Lower Uterine Segment Involvement: Not identified   Cervical Stromal Involvement: Not identified   Other Tissue / Organ Involvement: Not applicable   Lymphovascular Invasion: Not identified   MARGINS   LYMPH NODES   Lymph Node Status: All lymph nodes negative for tumor cells   Total Number of Pelvic Nodes Examined: 1   Number of Pelvic Brave Nodes Examined: 1   PATHOLOGIC STAGE CLASSIFICATION (pTNM, AJCC 8th Edition)   Primary Tumor (pT): pT1a   Regional Lymph Nodes (pN)   Category (pN): p       Medications:     Current Outpatient Medications on File Prior to Visit   Medication Sig Dispense Refill    benazepriL (LOTENSIN) 20 mg tablet Take 1 po QD for HTN, raise to 2 tabs QD if BP > 140/90 (replaces lotrel)      pravastatin (PRAVACHOL) 20 mg tablet Take 20 mg by mouth daily.  fluticasone-umeclidin-vilanter (Trelegy Ellipta) 200-62.5-25 mcg inhaler Take 1 Puff by inhalation daily. 28 Each 0    Anoro Ellipta 62.5-25 mcg/actuation inhaler INHALE 1 PUFF BY MOUTH ONCE DAILY (Patient not taking: Reported on 1/20/2022)      albuterol-ipratropium (DUO-NEB) 2.5 mg-0.5 mg/3 ml nebu Take 3 mL by inhalation four (4) times daily. (Patient not taking: Reported on 10/27/2021)      montelukast (SINGULAIR) 10 mg tablet TAKE 1 TABLET BY MOUTH NIGHTLY AS NEEDED FOR WHEEZING OR ALLERGIES      albuterol (PROVENTIL HFA, VENTOLIN HFA, PROAIR HFA) 90 mcg/actuation inhaler Take 1 Puff by inhalation every four (4) hours as needed for Wheezing. (Patient not taking: Reported on 10/27/2021)      ferrous sulfate (Iron) 325 mg (65 mg iron) tablet Take  by mouth Daily (before breakfast).  colestipoL (COLESTID) 1 gram tablet Take 1 g by mouth two (2) times a day.  Indications: high cholesterol (Patient not taking: Reported on 6/23/2021)      furosemide (LASIX) 20 mg tablet Take 20 mg by mouth as needed.  LANTUS SOLOSTAR U-100 INSULIN 100 unit/mL (3 mL) inpn 20 Units by SubCUTAneous route nightly.  NOVOLOG FLEXPEN U-100 INSULIN 100 unit/mL inpn 1-6 Units by SubCUTAneous route Before breakfast, lunch, and dinner.  amLODIPine-benazepril (LOTREL) 10-20 mg per capsule Take 1 Cap by mouth daily. (Patient not taking: Reported on 1/20/2022)      aspirin delayed-release 81 mg tablet Take 81 mg by mouth daily. No current facility-administered medications on file prior to visit. Allergies: Allergies   Allergen Reactions    Metformin Diarrhea    Other Medication Myalgia     Cholesterol medication (unsure of name of medication)       OBJECTIVE:    Vitals: There were no vitals taken for this visit. Physical Examination:    General:  alert, cooperative, no distress   Abdomen: soft, bowel sounds active, non-tender   Incision: healing well   Pelvic: NEFG, Spec exam revealed atrophic mucosa, no mass BME revealed no masses, nontender   Rectal: not done   Extremity:   extremities normal, atraumatic, no cyanosis or edema     IMPRESSION/PLAN:     Stage IAG1 endometrial cancer   -previously reviewed her pathology and favorable prognosis with low risk of recurrence and no need for adjuvant treatment   -reviewed surveillance strategy including exams every 4 months x 2 years, then every 6 months x 3 years then annually   -discussed s/sx of recurrence   -pelvic exam performed today   -all of patients questions and concerns address   -will plan for f/u in 4 months    Total time spent was 25 minutes regarding diagnosis of endometrial cancer and >50% of this time was spent counseling and coordinating care. No Estrada DO  Gynecologic Oncology  3/23/2022/3:13 PM      Elsie Ganser is a 64 y.o. female presents in office for endometrial cancer surveillance.      Chief Complaint   Patient presents with    Follow-up        Do you have any unusual vaginal bleeding, discharge or irritation? no.  Do you have any changes in your bowel movements? No  Have you been experiencing nausea or vomiting? No  Have you been experiencing any continuous or worsening abdominal pain? no  Any urinary burning? No    Visit Vitals  BP (!) 142/81 (BP 1 Location: Right lower arm, BP Patient Position: Sitting)   Pulse 82   Temp 97.2 °F (36.2 °C) (Oral)   Ht 5' 5\" (1.651 m)   Wt 233 lb (105.7 kg)   SpO2 98%   BMI 38.77 kg/m²         1. Have you been to the ER, urgent care clinic since your last visit? Hospitalized since your last visit? No    2. Have you seen or consulted any other health care providers outside of the [address] [place] [address] since your last visit? Include any pap smears or colon screening.  No    3 most recent PHQ Screens 3/23/2022   Little interest or pleasure in doing things Not at all   Feeling down, depressed, irritable, or hopeless Not at all   Total Score PHQ 2 0       Learning Assessment 10/20/2020   PRIMARY LEARNER Patient   BARRIERS PRIMARY LEARNER NONE   CO-LEARNER CAREGIVER No   PRIMARY LANGUAGE ENGLISH   LEARNER PREFERENCE PRIMARY DEMONSTRATION   ANSWERED BY Patient   RELATIONSHIP SELF       Sincerely,      Betty Guerrero MD

## 2022-03-23 NOTE — LETTER
3/23/2022    Patient: Addis Easley   YOB: 1945   Date of Visit: 3/23/2022     Chandni Turner MD  [address] [place]  Via Fax: [phone]    Dear Chandni Turner MD,      Thank you for referring Ms. Addis Easley to [address] [place] [address] SPECIALIST AT [address] [place] [address] for evaluation. My notes for this consultation are attached. If you have questions, please do not hesitate to call me. I look forward to following your patient along with you.       Sincerely,    Ekta Dow MD

## 2022-03-29 ENCOUNTER — DOCUMENTATION ONLY (OUTPATIENT)
Dept: PULMONOLOGY | Age: 77
End: 2022-03-29

## (undated) DEVICE — STERILE POLYISOPRENE POWDER-FREE SURGICAL GLOVES: Brand: PROTEXIS

## (undated) DEVICE — INTENDED FOR TISSUE SEPARATION, AND OTHER PROCEDURES THAT REQUIRE A SHARP SURGICAL BLADE TO PUNCTURE OR CUT.: Brand: BARD-PARKER ® CARBON RIB-BACK BLADES

## (undated) DEVICE — TOTAL TRAY, DB, 100% SILI FOLEY, 16FR 10: Brand: MEDLINE

## (undated) DEVICE — TISSUE RETRIEVAL SYSTEM: Brand: INZII RETRIEVAL SYSTEM

## (undated) DEVICE — SOL IRRIGATION INJ NACL 0.9% 500ML BTL

## (undated) DEVICE — GOWN,SIRUS,NONRNF,SETINSLV,2XL,18/CS: Brand: MEDLINE

## (undated) DEVICE — STERILE LATEX POWDER-FREE SURGICAL GLOVESWITH NITRILE COATING: Brand: PROTEXIS

## (undated) DEVICE — SUTURE COAT VCRL SZ 0 L18IN ABSRB UD CTX L48MM 1/2 CIR J724D

## (undated) DEVICE — ROBOTIC PACK: Brand: MEDLINE INDUSTRIES, INC.

## (undated) DEVICE — FLOSEAL MATRIX IS INDICATED IN SURGICAL PROCEDURES (OTHER THAN IN OPHTHALMIC) AS AN ADJUNCT TO HEMOSTASIS WHEN CONTROL OF BLEEDING BY LIGATURE OR CONVENTIONALPROCEDURES IS INEFFECTIVE OR IMPRACTICAL.: Brand: FLOSEAL HEMOSTATIC MATRIX

## (undated) DEVICE — SEAL CANN F/12MM 8.5-13MM DISP -- DA VINCI

## (undated) DEVICE — SUTURE VCRL SZ 0 L36IN ABSRB UD L48MM CTX 1/2 CIR J978H

## (undated) DEVICE — ELECTRODE BLDE L4IN NONINSULATED EDGE

## (undated) DEVICE — 3M™ IOBAN™ 2 ANTIMICROBIAL INCISE DRAPE 6650EZ: Brand: IOBAN™ 2

## (undated) DEVICE — SUTURE PDS II SZ 2-0 L27IN ABSRB VLT L36MM CT-1 1/2 CIR Z339H

## (undated) DEVICE — OBTRTR BLDELSS 8MM DISP -- DA VINCI - SNGL USE

## (undated) DEVICE — SPONGE GZ W4XL4IN COT 12 PLY TYP VII WVN C FLD DSGN

## (undated) DEVICE — TRI-LUMEN FILTERED TUBE SET WITH ACTIVATED CHARCOAL FILTER: Brand: AIRSEAL

## (undated) DEVICE — KIT DRP 3 ARM ACC DISP ENDOWRIST DA VINCI SI

## (undated) DEVICE — SYR 10ML LUER LOK 1/5ML GRAD --

## (undated) DEVICE — KENDALL SCD EXPRESS SLEEVES, KNEE LENGTH, MEDIUM: Brand: KENDALL SCD

## (undated) DEVICE — STERILE POLYISOPRENE POWDER-FREE SURGICAL GLOVES WITH EMOLLIENT COATING: Brand: PROTEXIS

## (undated) DEVICE — DISPOSABLE SUCTION/IRRIGATOR TUBE SET WITH TIP: Brand: AHTO

## (undated) DEVICE — TRAY CATH 16F URIN MTR LTX -- CONVERT TO ITEM 363111

## (undated) DEVICE — SUT MONOCRYL PLUS UD 4-0 --

## (undated) DEVICE — X-RAY SPONGES,12 PLY: Brand: DERMACEA

## (undated) DEVICE — BRUSH SCRB PCMX NL CLN 12ML --

## (undated) DEVICE — TOOL 14MH30 LEGEND 14CM 3MM: Brand: MIDAS REX ™

## (undated) DEVICE — SHEET,DRAPE,70X100,STERILE: Brand: MEDLINE

## (undated) DEVICE — 12FR FRAZIER SUCTION HANDLE: Brand: CARDINAL HEALTH

## (undated) DEVICE — PACKING 8004051 NEURAY 200PK 13X152MM: Brand: NEURAY ®

## (undated) DEVICE — TIP IU L8CM DIA6.7MM BLU SIL FLX DISP RUMI II

## (undated) DEVICE — SUTURE VCRL SZ 4-0 L27IN ABSRB UD L19MM PS-2 3/8 CIR PRIM J426H

## (undated) DEVICE — GARMENT,MEDLINE,DVT,INT,CALF,MED, GEN2: Brand: MEDLINE

## (undated) DEVICE — SYSTEM ES CUP DIA3CM PNEUMO OCCL BLLN DISP FOR CLIN POS

## (undated) DEVICE — GAUZE,SPONGE,8"X4",12PLY,XRAY,STRL,LF: Brand: MEDLINE

## (undated) DEVICE — SYRINGE BLB 50CC IRRIG PLIABLE FNGR FLNG GRAD FLSK DISP

## (undated) DEVICE — REM POLYHESIVE ADULT PATIENT RETURN ELECTRODE: Brand: VALLEYLAB

## (undated) DEVICE — VISUALIZATION SYSTEM: Brand: CLEARIFY

## (undated) DEVICE — MAYO STAND COVER: Brand: CONVERTORS

## (undated) DEVICE — PACKING 8004050 NEURAY 200PK 7X152MM: Brand: NEURAY ®

## (undated) DEVICE — 40595 XL TRENDELENBURG POSITIONING KIT: Brand: 40595 XL TRENDELENBURG POSITIONING KIT

## (undated) DEVICE — SOLUTION LACTATED RINGERS INJECTION USP

## (undated) DEVICE — BIPOLAR FORCEPS CORD,BANANA LEADS: Brand: VALLEYLAB

## (undated) DEVICE — SYR 10ML CTRL LR LCK NSAF LF --

## (undated) DEVICE — PAD,ABDOMINAL,5"X9",STERILE,LF,1/PK: Brand: MEDLINE INDUSTRIES, INC.

## (undated) DEVICE — TOWEL SURG W16XL26IN BLU NONFENESTRATED DLX ST 2 PER PK

## (undated) DEVICE — COVER MPLR TIP CRV SCIS ACC DA VINCI

## (undated) DEVICE — AIRSEAL 5 MM ACCESS PORT AND LOW PROFILE OBTURATOR WITH BLADELESS OPTICAL TIP, 100 MM LENGTH: Brand: AIRSEAL

## (undated) DEVICE — TRAP MUCUS SPECIMEN 40ML -- MEDICHOICE

## (undated) DEVICE — SUTURE ABSORBABLE BRAIDED 2-0 CT-1 27 IN UD VICRYL J259H

## (undated) DEVICE — TOWEL,OR,DSP,ST,BLUE,STD,4/PK,20PK/CS: Brand: MEDLINE

## (undated) DEVICE — SPINE PACK DEPAUL: Brand: MEDLINE INDUSTRIES, INC.

## (undated) DEVICE — NDL PRT INJ NSAF BLNT 18GX1.5 --

## (undated) DEVICE — SYR LR LCK 1ML GRAD NSAF 30ML --

## (undated) DEVICE — GRAFT BNE MAR ART BMC MED

## (undated) DEVICE — PAD PREP ALCOHOL LG STERILE -- CONVERT TO ITEM 305014

## (undated) DEVICE — PREP SKN DURAPREP 26ML APPL --

## (undated) DEVICE — SUTURE COAT VCRL PC 5 PRECIS COSM CONVENTIONAL CUT PRIM 3 8 J823H

## (undated) DEVICE — PREP SKN CHLRAPRP APL 26ML STR --

## (undated) DEVICE — THE MILL DISPOSABLE - FINE